# Patient Record
Sex: FEMALE | Race: BLACK OR AFRICAN AMERICAN | NOT HISPANIC OR LATINO | Employment: STUDENT | URBAN - METROPOLITAN AREA
[De-identification: names, ages, dates, MRNs, and addresses within clinical notes are randomized per-mention and may not be internally consistent; named-entity substitution may affect disease eponyms.]

---

## 2017-07-26 ENCOUNTER — APPOINTMENT (OUTPATIENT)
Dept: LAB | Facility: HOSPITAL | Age: 32
End: 2017-07-26
Payer: COMMERCIAL

## 2017-07-26 ENCOUNTER — TRANSCRIBE ORDERS (OUTPATIENT)
Dept: LAB | Facility: HOSPITAL | Age: 32
End: 2017-07-26

## 2017-07-26 DIAGNOSIS — Z00.8 HEALTH EXAMINATION IN POPULATION SURVEY: ICD-10-CM

## 2017-07-26 DIAGNOSIS — Z00.8 HEALTH EXAMINATION IN POPULATION SURVEY: Primary | ICD-10-CM

## 2017-07-26 LAB
CHOLEST SERPL-MCNC: 177 MG/DL (ref 50–200)
EST. AVERAGE GLUCOSE BLD GHB EST-MCNC: 131 MG/DL
HBA1C MFR BLD: 6.2 % (ref 4.2–6.3)
HDLC SERPL-MCNC: 49 MG/DL (ref 40–60)
LDLC SERPL CALC-MCNC: 120 MG/DL (ref 0–100)
TRIGL SERPL-MCNC: 42 MG/DL

## 2017-07-26 PROCEDURE — 83036 HEMOGLOBIN GLYCOSYLATED A1C: CPT

## 2017-07-26 PROCEDURE — 80061 LIPID PANEL: CPT

## 2017-07-26 PROCEDURE — 36415 COLL VENOUS BLD VENIPUNCTURE: CPT

## 2018-01-04 ENCOUNTER — GENERIC CONVERSION - ENCOUNTER (OUTPATIENT)
Dept: OTHER | Facility: OTHER | Age: 33
End: 2018-01-04

## 2018-01-04 ENCOUNTER — APPOINTMENT (OUTPATIENT)
Dept: LAB | Facility: HOSPITAL | Age: 33
End: 2018-01-04
Payer: COMMERCIAL

## 2018-01-04 ENCOUNTER — HOSPITAL ENCOUNTER (OUTPATIENT)
Dept: RADIOLOGY | Facility: HOSPITAL | Age: 33
Discharge: HOME | End: 2018-01-04
Payer: COMMERCIAL

## 2018-01-04 DIAGNOSIS — M25.531 PAIN IN RIGHT WRIST: ICD-10-CM

## 2018-01-04 DIAGNOSIS — Z13.220 ENCOUNTER FOR SCREENING FOR LIPOID DISORDERS: ICD-10-CM

## 2018-01-04 DIAGNOSIS — D64.9 ANEMIA: ICD-10-CM

## 2018-01-04 LAB
ALBUMIN SERPL BCP-MCNC: 3.7 G/DL (ref 3.5–5)
ALP SERPL-CCNC: 58 U/L (ref 46–116)
ALT SERPL W P-5'-P-CCNC: 22 U/L (ref 12–78)
ANION GAP SERPL CALCULATED.3IONS-SCNC: 6 MMOL/L (ref 4–13)
AST SERPL W P-5'-P-CCNC: 23 U/L (ref 5–45)
BASOPHILS # BLD AUTO: 0.01 THOUSANDS/ΜL (ref 0–0.1)
BASOPHILS NFR BLD AUTO: 0 % (ref 0–1)
BILIRUB SERPL-MCNC: 0.29 MG/DL (ref 0.2–1)
BUN SERPL-MCNC: 9 MG/DL (ref 5–25)
CALCIUM SERPL-MCNC: 9.4 MG/DL (ref 8.3–10.1)
CHLORIDE SERPL-SCNC: 104 MMOL/L (ref 100–108)
CHOLEST SERPL-MCNC: 173 MG/DL (ref 50–200)
CO2 SERPL-SCNC: 27 MMOL/L (ref 21–32)
CREAT SERPL-MCNC: 0.74 MG/DL (ref 0.6–1.3)
EOSINOPHIL # BLD AUTO: 0.06 THOUSAND/ΜL (ref 0–0.61)
EOSINOPHIL NFR BLD AUTO: 1 % (ref 0–6)
ERYTHROCYTE [DISTWIDTH] IN BLOOD BY AUTOMATED COUNT: 13.4 % (ref 11.6–15.1)
GFR SERPL CREATININE-BSD FRML MDRD: 123 ML/MIN/1.73SQ M
GLUCOSE P FAST SERPL-MCNC: 97 MG/DL (ref 65–99)
HCT VFR BLD AUTO: 37.7 % (ref 34.8–46.1)
HDLC SERPL-MCNC: 46 MG/DL (ref 40–60)
HGB BLD-MCNC: 12.3 G/DL (ref 11.5–15.4)
IRON SATN MFR SERPL: 10 %
IRON SERPL-MCNC: 41 UG/DL (ref 50–170)
LDLC SERPL CALC-MCNC: 113 MG/DL (ref 0–100)
LYMPHOCYTES # BLD AUTO: 2.76 THOUSANDS/ΜL (ref 0.6–4.47)
LYMPHOCYTES NFR BLD AUTO: 48 % (ref 14–44)
MCH RBC QN AUTO: 28.1 PG (ref 26.8–34.3)
MCHC RBC AUTO-ENTMCNC: 32.6 G/DL (ref 31.4–37.4)
MCV RBC AUTO: 86 FL (ref 82–98)
MONOCYTES # BLD AUTO: 0.33 THOUSAND/ΜL (ref 0.17–1.22)
MONOCYTES NFR BLD AUTO: 6 % (ref 4–12)
NEUTROPHILS # BLD AUTO: 2.56 THOUSANDS/ΜL (ref 1.85–7.62)
NEUTS SEG NFR BLD AUTO: 45 % (ref 43–75)
PLATELET # BLD AUTO: 412 THOUSANDS/UL (ref 149–390)
PMV BLD AUTO: 10.1 FL (ref 8.9–12.7)
POTASSIUM SERPL-SCNC: 4.8 MMOL/L (ref 3.5–5.3)
PROT SERPL-MCNC: 8 G/DL (ref 6.4–8.2)
RBC # BLD AUTO: 4.38 MILLION/UL (ref 3.81–5.12)
SODIUM SERPL-SCNC: 137 MMOL/L (ref 136–145)
TIBC SERPL-MCNC: 416 UG/DL (ref 250–450)
TRIGL SERPL-MCNC: 69 MG/DL
WBC # BLD AUTO: 5.72 THOUSAND/UL (ref 4.31–10.16)

## 2018-01-04 PROCEDURE — 80053 COMPREHEN METABOLIC PANEL: CPT

## 2018-01-04 PROCEDURE — 83550 IRON BINDING TEST: CPT

## 2018-01-04 PROCEDURE — 85025 COMPLETE CBC W/AUTO DIFF WBC: CPT

## 2018-01-04 PROCEDURE — 83540 ASSAY OF IRON: CPT

## 2018-01-04 PROCEDURE — 36415 COLL VENOUS BLD VENIPUNCTURE: CPT

## 2018-01-04 PROCEDURE — 80061 LIPID PANEL: CPT

## 2018-01-04 PROCEDURE — 73110 X-RAY EXAM OF WRIST: CPT

## 2018-01-05 ENCOUNTER — GENERIC CONVERSION - ENCOUNTER (OUTPATIENT)
Dept: OTHER | Facility: OTHER | Age: 33
End: 2018-01-05

## 2018-01-16 NOTE — PROGRESS NOTES
Chief Complaint  Patient presents today for flu shot  Vaccine administered in left deltoid  Patient tolerated vaccine and left office  BB CMA      Active Problems    1  1St trimester screening (V28 89) (Z36)   2  Acute conjunctivitis, right eye (372 00) (H10 31)   3  Acute pharyngitis (462) (J02 9)   4  Allergic rhinitis (477 9) (J30 9)   5  Anemia (285 9) (D64 9)   6  Encounter for fetal anatomic survey (V28 81) (Z36)   7  Encounter for screening for risk of pre-term labor (V28 82) (Z36)   8  Headache (784 0) (R51)   9  Leiomyoma of uterus (218 9) (D25 9)   10  Postprandial RUQ pain (789 01) (R10 11)   11  Pregnancy (V22 2) (Z33 1)   12  Pregnancy at early stage (V22 2) (Z33 1)   13  Pregnancy with history of uterine myomectomy (654 90) (O34 29)   14  Uterine fibroids affecting pregnancy, antepartum (654 13) (O34 10)    Current Meds   1  Ferrous Gluconate 324 (38 Fe) MG Oral Tablet; TAKE 1 TABLET TWICE DAILY; Therapy: 71Otj2440 to (Evaluate:23Jan2017)  Requested for: 57Zbf0249; Last   Rx:[id number] Ordered   2  Vitamin C 250 MG Oral Tablet; TAKE 1 TABLET TWICE DAILY WITH IRON   SUPPLEMENT; Therapy: 01Tip2928 to (Evaluate:23Jan2017)  Requested for: 26Goe3620; Last   Rx:[id number] Ordered    Allergies    1  No Known Drug Allergies    2  No Known Environmental Allergies   3   No Known Food Allergies    Plan  Need for influenza vaccination    · Fluarix Intramuscular Suspension    Signatures   Electronically signed by : Moshe Madden, ; Nov 16 2016 12:52PM EST                       (Author)    Electronically signed by : SANTA Arredondo ; Nov 16 2016  1:40PM EST

## 2018-01-16 NOTE — RESULT NOTES
Verified Results  (1) CBC/PLT/DIFF [id number] 03:44PM Rogelio Reilly Order Number: [id number]     Test Name Result Flag Reference   WBC COUNT 5 23 Thousand/uL  4 31-10 16   RBC COUNT 4 02 Million/uL  3 81-5 12   HEMOGLOBIN 11 5 g/dL  11 5-15 4   HEMATOCRIT 34 2 % L 34 8-46  1   MCV 85 fL  82-98   MCH 28 6 pg  26 8-34 3   MCHC 33 6 g/dL  31 4-37 4   RDW 13 7 %  11 6-15 1   MPV 10 1 fL  8 9-12 7   PLATELET COUNT 386 Thousands/uL  149-390   nRBC AUTOMATED 0 /100 WBCs     NEUTROPHILS RELATIVE PERCENT 39 % L 43-75   LYMPHOCYTES RELATIVE PERCENT 55 % H 14-44   MONOCYTES RELATIVE PERCENT 5 %  4-12   EOSINOPHILS RELATIVE PERCENT 1 %  0-6   BASOPHILS RELATIVE PERCENT 0 %  0-1   NEUTROPHILS ABSOLUTE COUNT 2 05 Thousands/?L  1 85-7 62   LYMPHOCYTES ABSOLUTE COUNT 2 85 Thousands/?L  0 60-4 47   MONOCYTES ABSOLUTE COUNT 0 28 Thousand/?L  0 17-1 22   EOSINOPHILS ABSOLUTE COUNT 0 05 Thousand/?L  0 00-0 61   BASOPHILS ABSOLUTE COUNT 0 00 Thousands/?L  0 00-0 10   - Patient Instructions: This bloodwork is non-fasting  Please drink two glasses of water morning of bloodwork  - Patient Instructions: This bloodwork is non-fasting  Please drink two glasses of water morning of bloodwork  (1) COMPREHENSIVE METABOLIC PANEL [id number] 02:72XI Rogelio Reilly Order Number: [id number]     Test Name Result Flag Reference   GLUCOSE,RANDM 94 mg/dL     If the patient is fasting, the ADA then defines impaired fasting glucose as > 100 mg/dL and diabetes as > or equal to 123 mg/dL     SODIUM 139 mmol/L  136-145   POTASSIUM 4 0 mmol/L  3 5-5 3   CHLORIDE 105 mmol/L  100-108   CARBON DIOXIDE 29 mmol/L  21-32   ANION GAP (CALC) 5 mmol/L  4-13   BLOOD UREA NITROGEN 11 mg/dL  5-25   CREATININE 0 76 mg/dL  0 60-1 30   Standardized to IDMS reference method   CALCIUM 9 4 mg/dL  8 3-10 1   BILI, TOTAL 0 22 mg/dL  0 20-1 00   ALK PHOSPHATAS 76 U/L     ALT (SGPT) 16 U/L  12-78   AST(SGOT) 15 U/L  5-45   ALBUMIN 3 7 g/dL  3 5-5 0   TOTAL PROTEIN 7 4 g/dL  6 4-8 2   eGFR Non-African American      >60 0 ml/min/1 73sq [facility] Disease Education Program recommendations are as follows:  GFR calculation is accurate only with a steady state creatinine  Chronic Kidney disease less than 60 ml/min/1 73 sq  meters  Kidney failure less than 15 ml/min/1 73 sq  meters       (1) IRON SATURATION %, TIBC [id number] 03:44PM Kranthi Ada Order Number: [id number]     Test Name Result Flag Reference   IRON SATURATION 9 %     TOTAL IRON BINDING CAPACITY 400 ug/dL  250-450   IRON 37 ug/dL L      (1) MAGNESIUM [id number] 03:44PM Kranthi Ada Order Number: [id number]     Test Name Result Flag Reference   MAGNESIUM 2 1 mg/dL  1 6-2 6       Plan  Anemia    · Ferrous Gluconate 324 (38 Fe) MG Oral Tablet; TAKE 1 TABLET TWICE DAILY   · Vitamin C 250 MG Oral Tablet; TAKE 1 TABLET TWICE DAILY WITH IRON  SUPPLEMENT

## 2018-01-23 NOTE — RESULT NOTES
Verified Results  * XR WRIST 3+ VIEW RIGHT [id number] 11:41AM [facility] Order Number: [id number]     Test Name Result Flag Reference   XR WRIST 3+ VW RIGHT (Report)     RIGHT WRIST     INDICATION:  M25 531: Pain in right wrist  History taken directly from the electronic ordering system  COMPARISON: None     VIEWS: PA, lateral, and oblique     IMAGES: 3     FINDINGS:     There is no acute fracture or dislocation  No degenerative changes  No lytic or blastic lesions are seen  Soft tissues are unremarkable  IMPRESSION:     No acute osseous abnormality  If there is clinical concern for a radiographically occult fracture, consider further evaluation with MRI         Workstation performed: [id number]     Signed by:   Sheryl Mcintyre MD   1/5/18

## 2018-01-23 NOTE — RESULT NOTES
Verified Results  (1) CBC/PLT/DIFF [id number] 11:51AM [facility] Order Number: [id number]     Test Name Result Flag Reference   WBC COUNT 5 72 Thousand/uL  4 31-10 16   RBC COUNT 4 38 Million/uL  3 81-5 12   HEMOGLOBIN 12 3 g/dL  11 5-15 4   HEMATOCRIT 37 7 %  34 8-46  1   MCV 86 fL  82-98   MCH 28 1 pg  26 8-34 3   MCHC 32 6 g/dL  31 4-37 4   RDW 13 4 %  11 6-15 1   MPV 10 1 fL  8 9-12 7   PLATELET COUNT 281 Thousands/uL H 149-390   NEUTROPHILS RELATIVE PERCENT 45 %  43-75   LYMPHOCYTES RELATIVE PERCENT 48 % H 14-44   MONOCYTES RELATIVE PERCENT 6 %  4-12   EOSINOPHILS RELATIVE PERCENT 1 %  0-6   BASOPHILS RELATIVE PERCENT 0 %  0-1   NEUTROPHILS ABSOLUTE COUNT 2 56 Thousands/? ??L  1 85-7 62   LYMPHOCYTES ABSOLUTE COUNT 2 76 Thousands/? ??L  0 60-4 47   MONOCYTES ABSOLUTE COUNT 0 33 Thousand/? ??L  0 17-1 22   EOSINOPHILS ABSOLUTE COUNT 0 06 Thousand/? ??L  0 00-0 61   BASOPHILS ABSOLUTE COUNT 0 01 Thousands/? ??L  0 00-0 10     (1) COMPREHENSIVE METABOLIC PANEL [id number] 88:31GP [facility] Order Number: [id number]     Test Name Result Flag Reference   SODIUM 137 mmol/L  136-145   POTASSIUM 4 8 mmol/L  3 5-5 3   CHLORIDE 104 mmol/L  100-108   CARBON DIOXIDE 27 mmol/L  21-32   ANION GAP (CALC) 6 mmol/L  4-13   BLOOD UREA NITROGEN 9 mg/dL  5-25   CREATININE 0 74 mg/dL  0 60-1 30   Standardized to IDMS reference method   CALCIUM 9 4 mg/dL  8 3-10 1   BILI, TOTAL 0 29 mg/dL  0 20-1 00   ALK PHOSPHATAS 58 U/L     ALT (SGPT) 22 U/L  12-78   Specimen collection should occur prior to Sulfasalazine administration due to the potential for falsely depressed results  AST(SGOT) 23 U/L  5-45   Specimen collection should occur prior to Sulfasalazine administration due to the potential for falsely depressed results     ALBUMIN 3 7 g/dL  3 5-5 0   TOTAL PROTEIN 8 0 g/dL  6 4-8 2   eGFR 123 ml/min/1 73sq m     National Kidney Disease Education Program recommendations are as follows:  GFR calculation is accurate only with a steady state creatinine  Chronic Kidney disease less than 60 ml/min/1 73 sq  meters  Kidney failure less than 15 ml/min/1 73 sq  meters  GLUCOSE FASTING 97 mg/dL  65-99   Specimen collection should occur prior to Sulfasalazine administration due to the potential for falsely depressed results  Specimen collection should occur prior to Sulfapyridine administration due to the potential for falsely elevated results  (1) LIPID PANEL, FASTING [id number] 11:51AM Kayla Gonzales Order Number: [id number]     Test Name Result Flag Reference   CHOLESTEROL 173 mg/dL     HDL,DIRECT 46 mg/dL  40-60   Specimen collection should occur prior to Metamizole administration due to the potential for falsley depressed results  LDL CHOLESTEROL CALCULATED 113 mg/dL H 0-100   Triglyceride:        Normal <150 mg/dl   Borderline High 150-199 mg/dl   High 200-499 mg/dl   Very High >499 mg/dl      Cholesterol:       Desirable <200 mg/dl    Borderline High 200-239 mg/dl    High >239 mg/dl      HDL Cholesterol:       High>59 mg/dL    Low <41 mg/dL      This screening LDL is a calculated result  It does not have the accuracy of the Direct Measured LDL in the monitoring of patients with hyperlipidemia and/or statin therapy  Direct Measure LDL (YKG186) must be ordered separately in these patients  TRIGLYCERIDES 69 mg/dL  <=150   Specimen collection should occur prior to N-Acetylcysteine or Metamizole administration due to the potential for falsely depressed results  (1) IRON SATURATION %, TIBC [id number] 11:51AM Kayla Gonzales Order Number: [id number]     Test Name Result Flag Reference   IRON SATURATION 10 %     TOTAL IRON BINDING CAPACITY 416 ug/dL  250-450   IRON 41 ug/dL L    Patients treated with metal-binding drugs (ie  Deferoxamine) may have depressed iron values

## 2018-01-24 VITALS
OXYGEN SATURATION: 100 % | TEMPERATURE: 97.7 F | DIASTOLIC BLOOD PRESSURE: 64 MMHG | BODY MASS INDEX: 27.88 KG/M2 | SYSTOLIC BLOOD PRESSURE: 110 MMHG | RESPIRATION RATE: 18 BRPM | HEART RATE: 80 BPM | HEIGHT: 62 IN | WEIGHT: 151.5 LBS

## 2018-06-26 ENCOUNTER — APPOINTMENT (OUTPATIENT)
Dept: LAB | Facility: HOSPITAL | Age: 33
End: 2018-06-26

## 2018-06-26 ENCOUNTER — TRANSCRIBE ORDERS (OUTPATIENT)
Dept: LAB | Facility: HOSPITAL | Age: 33
End: 2018-06-26

## 2018-06-26 DIAGNOSIS — Z00.8 HEALTH EXAMINATION IN POPULATION SURVEY: Primary | ICD-10-CM

## 2018-06-26 DIAGNOSIS — Z00.8 HEALTH EXAMINATION IN POPULATION SURVEY: ICD-10-CM

## 2018-06-26 LAB
CHOLEST SERPL-MCNC: 199 MG/DL (ref 50–200)
EST. AVERAGE GLUCOSE BLD GHB EST-MCNC: 120 MG/DL
HBA1C MFR BLD: 5.8 % (ref 4.2–6.3)
HDLC SERPL-MCNC: 55 MG/DL (ref 40–60)
LDLC SERPL CALC-MCNC: 130 MG/DL (ref 0–100)
NONHDLC SERPL-MCNC: 144 MG/DL
TRIGL SERPL-MCNC: 69 MG/DL

## 2018-06-26 PROCEDURE — 80061 LIPID PANEL: CPT

## 2018-06-26 PROCEDURE — 36415 COLL VENOUS BLD VENIPUNCTURE: CPT

## 2018-06-26 PROCEDURE — 83036 HEMOGLOBIN GLYCOSYLATED A1C: CPT

## 2018-06-28 ENCOUNTER — OFFICE VISIT (OUTPATIENT)
Dept: GYNECOLOGY | Facility: CLINIC | Age: 33
End: 2018-06-28
Payer: COMMERCIAL

## 2018-06-28 VITALS
SYSTOLIC BLOOD PRESSURE: 118 MMHG | HEIGHT: 62 IN | HEART RATE: 77 BPM | WEIGHT: 154.2 LBS | BODY MASS INDEX: 28.37 KG/M2 | DIASTOLIC BLOOD PRESSURE: 76 MMHG

## 2018-06-28 DIAGNOSIS — Z01.419 ENCOUNTER FOR ANNUAL ROUTINE GYNECOLOGICAL EXAMINATION: Primary | ICD-10-CM

## 2018-06-28 DIAGNOSIS — Z98.890 HISTORY OF MYOMECTOMY: ICD-10-CM

## 2018-06-28 DIAGNOSIS — Z98.891 HISTORY OF CESAREAN SECTION: ICD-10-CM

## 2018-06-28 PROCEDURE — 87624 HPV HI-RISK TYP POOLED RSLT: CPT | Performed by: OBSTETRICS & GYNECOLOGY

## 2018-06-28 PROCEDURE — G0145 SCR C/V CYTO,THINLAYER,RESCR: HCPCS | Performed by: OBSTETRICS & GYNECOLOGY

## 2018-06-28 PROCEDURE — 99395 PREV VISIT EST AGE 18-39: CPT | Performed by: OBSTETRICS & GYNECOLOGY

## 2018-06-28 RX ORDER — IBUPROFEN 200 MG
200 TABLET ORAL EVERY 6 HOURS PRN
COMMUNITY
End: 2019-11-26 | Stop reason: ALTCHOICE

## 2018-06-28 NOTE — PROGRESS NOTES
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  Recommendation was to have her family completed within a few years of the myomectomy  Menorrhagia- 2 Advil q 6 hrs  w onset of bleeding  Co Testing q 3yrs-   RTO 1yr  SBE monthly  Exercise 3/wk  Calcium 1,000 mg/d with Vit D        Diagnoses and all orders for this visit:    Encounter for annual routine gynecological examination  -     Liquid-based pap, screening    History of  section    History of myomectomy    Other orders  -     ibuprofen (MOTRIN) 200 mg tablet; Take 200 mg by mouth every 6 (six) hours as needed for mild pain              Subjective:        Patient ID: Reyes Ellington is a 34 y o  female  Laly is here for an annual visit  She notes cyclic breast tenderness which begins approximately 2 days before her period  At this point she is not interested in having children 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 nursing school  At the time of the myomectomy it was recommended to have her pregnancies completed over short period of time in case the fibroid grew back  The following portions of the patient's history were reviewed and updated as appropriate: She  has a past medical history of Fibroid  Patient Active Problem List    Diagnosis Date Noted    Encounter for annual routine gynecological examination 2018    History of  section 2018    History of myomectomy 2018   PMH:   Menarche 12  Primary infertility '- HSG Dx submucous fibroids , Submucous Myomectomy (2, others present and observed)   Waited till  to attempt conception  Chest Pain ' - nl Holter and Echo  C/S at 37 wks due to myomectomy 4/15  Menorrhagia- since delivery  She  has a past surgical history that includes Myomectomy and  section    Her family history includes Dementia in her maternal grandmother; Diabetes in her paternal grandfather; Hypertension in her father, mother, and sister  She  reports that she has never smoked  She has never used smokeless tobacco  She reports that she does not drink alcohol or use drugs  PCA P8, currently in nursing school  From [facility], moved to [address] [place] [address] Rd[address] 7/12   '08  Current Outpatient Prescriptions   Medication Sig Dispense Refill    ibuprofen (MOTRIN) 200 mg tablet Take 200 mg by mouth every 6 (six) hours as needed for mild pain       No current facility-administered medications for this visit  No current outpatient prescriptions on file prior to visit  No current facility-administered medications on file prior to visit  She has No Known Allergies       Review of Systems   Constitutional: Negative for activity change, appetite change, fatigue and unexpected weight change  Eyes: Negative for visual disturbance  Respiratory: Negative for cough, chest tightness, shortness of breath and wheezing  Cardiovascular: Negative for chest pain, palpitations and leg swelling  Breast: Patient denies tenderness, nipple discharge, masses, or erythema  Gastrointestinal: Negative for abdominal distention, abdominal pain, blood in stool, constipation, diarrhea, nausea and vomiting  Endocrine: Negative for cold intolerance and heat intolerance  Genitourinary: Negative for decreased urine volume, difficulty urinating, dyspareunia, dysuria, frequency, hematuria, menstrual problem, pelvic pain, urgency, vaginal bleeding, vaginal discharge and vaginal pain  Coitus once a month partly to avoid pregnancy  Musculoskeletal: Negative for arthralgias  Skin: Negative for rash  Neurological: Negative for weakness, light-headedness, numbness and headaches  Hematological: Does not bruise/bleed easily  Psychiatric/Behavioral: Negative for agitation, behavioral problems and sleep disturbance  The patient is not nervous/anxious  Objective:    Vitals:    06/28/18 0952   BP: 118/76   BP Location: Right arm   Patient Position: Sitting   Cuff Size: Standard   Pulse: 77   Weight: 69 9 kg (154 lb 3 2 oz)   Height: 5' 2" (1 575 m)            Physical Exam   Constitutional: She is oriented to person, place, and time  She appears well-developed and well-nourished  HENT:   Head: Normocephalic and atraumatic  Eyes: Conjunctivae and EOM are normal  Pupils are equal, round, and reactive to light  Neck: Normal range of motion  Neck supple  No tracheal deviation present  No thyromegaly present  Cardiovascular: Normal rate, regular rhythm and normal heart sounds  No murmur heard  Pulmonary/Chest: Effort normal and breath sounds normal  No respiratory distress  She has no wheezes  Right breast exhibits no inverted nipple, no mass, no nipple discharge, no skin change and no tenderness  Left breast exhibits no inverted nipple, no mass, no nipple discharge, no skin change and no tenderness  Breasts are symmetrical    Abdominal: Soft  Bowel sounds are normal  She exhibits no distension and no mass  There is no tenderness  Genitourinary: Vagina normal and uterus normal  Rectal exam shows no external hemorrhoid  No breast swelling, tenderness, discharge or bleeding  There is no rash, tenderness or lesion on the right labia  There is no rash, tenderness or lesion on the left labia  Uterus is not deviated, not enlarged and not tender  Cervix exhibits no motion tenderness and no discharge  Right adnexum displays no mass, no tenderness and no fullness  Left adnexum displays no mass, no tenderness and no fullness  Genitourinary Comments: Uterus anteverted and normal size  Questionable nodule on the anterior fundus  Musculoskeletal: Normal range of motion  Neurological: She is alert and oriented to person, place, and time  Skin: Skin is warm and dry  Psychiatric: She has a normal mood and affect   Her behavior is normal  Judgment and thought content normal    Nursing note and vitals reviewed

## 2018-07-03 LAB — HPV RRNA GENITAL QL NAA+PROBE: NORMAL

## 2018-07-09 LAB
LAB AP GYN PRIMARY INTERPRETATION: NORMAL
Lab: NORMAL

## 2018-11-13 ENCOUNTER — OFFICE VISIT (OUTPATIENT)
Dept: FAMILY MEDICINE CLINIC | Facility: CLINIC | Age: 33
End: 2018-11-13
Payer: COMMERCIAL

## 2018-11-13 VITALS
RESPIRATION RATE: 18 BRPM | HEIGHT: 62 IN | SYSTOLIC BLOOD PRESSURE: 122 MMHG | WEIGHT: 155.44 LBS | OXYGEN SATURATION: 98 % | DIASTOLIC BLOOD PRESSURE: 80 MMHG | BODY MASS INDEX: 28.61 KG/M2 | TEMPERATURE: 97.6 F | HEART RATE: 116 BPM

## 2018-11-13 DIAGNOSIS — Z78.9 HEPATITIS B VACCINATION STATUS UNKNOWN: ICD-10-CM

## 2018-11-13 DIAGNOSIS — Z00.00 ANNUAL PHYSICAL EXAM: Primary | ICD-10-CM

## 2018-11-13 PROCEDURE — 99395 PREV VISIT EST AGE 18-39: CPT | Performed by: PHYSICIAN ASSISTANT

## 2018-11-13 NOTE — PROGRESS NOTES
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  Patient has no concerns or questions today  No problems with vision or hearing  Brushes teeth daily, and has regular dental visits  States he eats a healthy diet, 3 meals a day  No routine physical activity  Does follow up routinely with OBGYN  Denies any tobacco, alcohol, drug use  Has had flu shot already this year  Patient states that really helped does have copy of her immunization records  Believe she is up-to-date on everything  The following portions of the patient's history were reviewed and updated as appropriate:   She  has a past medical history of Fibroid  She   Patient Active Problem List    Diagnosis Date Noted    Encounter for annual routine gynecological examination 2018    History of  section 2018    History of myomectomy 2018     She  has a past surgical history that includes Myomectomy and  section  Her family history includes Dementia in her maternal grandmother; Diabetes in her paternal grandfather; Hypertension in her father, mother, and sister  She  reports that she has never smoked  She has never used smokeless tobacco  She reports that she does not drink alcohol or use drugs    Current Outpatient Prescriptions   Medication Sig Dispense Refill    ibuprofen (MOTRIN) 200 mg tablet Take 200 mg by mouth every 6 (six) hours as needed for mild pain       No current facility-administered medications for this visit  She has No Known Allergies       Review of Systems   Constitutional: Negative for chills, fatigue and fever  HENT: Negative for congestion, ear pain, hearing loss, rhinorrhea and sore throat  Eyes: Negative for pain and visual disturbance  Respiratory: Negative for cough, shortness of breath and wheezing  Cardiovascular: Negative for chest pain, palpitations and leg swelling  Gastrointestinal: Negative for abdominal pain, blood in stool, constipation, diarrhea, nausea and vomiting  Endocrine: Negative for cold intolerance, heat intolerance and polyuria  Genitourinary: Negative for difficulty urinating, dysuria, frequency, hematuria and urgency  Musculoskeletal: Negative for arthralgias, joint swelling and myalgias  Skin: Negative for rash and wound  Neurological: Negative for dizziness, syncope, weakness, numbness and headaches  Psychiatric/Behavioral: Negative for dysphoric mood and sleep disturbance  The patient is not nervous/anxious  Objective:      /80 (BP Location: Left arm, Patient Position: Sitting, Cuff Size: Standard)   Pulse (!) 116   Temp 97 6 °F (36 4 °C) (Tympanic)   Resp 18   Ht 5' 2" (1 575 m)   Wt 70 5 kg (155 lb 7 oz)   LMP 11/12/2018 (Exact Date)   SpO2 98%   BMI 28 43 kg/m²          Physical Exam   Constitutional: She is oriented to person, place, and time  She appears well-developed and well-nourished  No distress  HENT:   Right Ear: Hearing, tympanic membrane, external ear and ear canal normal    Left Ear: Hearing, tympanic membrane, external ear and ear canal normal    Nose: Nose normal    Mouth/Throat: Oropharynx is clear and moist and mucous membranes are normal  No oropharyngeal exudate  Eyes: Pupils are equal, round, and reactive to light   Conjunctivae, EOM and lids are normal    Neck: Normal range of motion  Neck supple  Cardiovascular: Normal rate, regular rhythm, normal heart sounds and normal pulses  Exam reveals no gallop and no friction rub  No murmur heard  Pulmonary/Chest: Effort normal and breath sounds normal  No respiratory distress  She has no wheezes  She has no rales  Abdominal: Soft  Normal appearance and bowel sounds are normal  She exhibits no distension  There is no tenderness  There is no rebound and no guarding  Musculoskeletal: Normal range of motion  She exhibits no edema  Lymphadenopathy:     She has no cervical adenopathy  Neurological: She is alert and oriented to person, place, and time  She has normal reflexes  No cranial nerve deficit  Skin: Skin is warm and dry  No rash noted  She is not diaphoretic  Psychiatric: She has a normal mood and affect  Her behavior is normal  Thought content normal    Nursing note and vitals reviewed

## 2018-12-06 ENCOUNTER — APPOINTMENT (OUTPATIENT)
Dept: URGENT CARE | Facility: MEDICAL CENTER | Age: 33
End: 2018-12-06

## 2018-12-06 ENCOUNTER — TRANSCRIBE ORDERS (OUTPATIENT)
Dept: URGENT CARE | Facility: MEDICAL CENTER | Age: 33
End: 2018-12-06

## 2018-12-06 ENCOUNTER — APPOINTMENT (OUTPATIENT)
Dept: LAB | Facility: MEDICAL CENTER | Age: 33
End: 2018-12-06

## 2018-12-06 DIAGNOSIS — Z02.0 ENCOUNTER FOR PHYSICAL EXAMINATION OF STUDENT: ICD-10-CM

## 2018-12-06 DIAGNOSIS — Z02.0 ENCOUNTER FOR PHYSICAL EXAMINATION OF STUDENT: Primary | ICD-10-CM

## 2018-12-06 DIAGNOSIS — Z78.9 HEPATITIS B VACCINATION STATUS UNKNOWN: ICD-10-CM

## 2018-12-06 LAB — HBV SURFACE AB SER-ACNC: >1000 MIU/ML

## 2018-12-06 PROCEDURE — 86480 TB TEST CELL IMMUN MEASURE: CPT

## 2018-12-06 PROCEDURE — 86706 HEP B SURFACE ANTIBODY: CPT

## 2018-12-06 PROCEDURE — 36415 COLL VENOUS BLD VENIPUNCTURE: CPT

## 2018-12-10 LAB
GAMMA INTERFERON BACKGROUND BLD IA-ACNC: 0.03 IU/ML
M TB IFN-G BLD-IMP: NEGATIVE
M TB IFN-G CD4+ BCKGRND COR BLD-ACNC: 0.03 IU/ML
M TB IFN-G CD4+ BCKGRND COR BLD-ACNC: 0.03 IU/ML
MITOGEN IGNF BCKGRD COR BLD-ACNC: >10 IU/ML

## 2019-04-14 ENCOUNTER — HOSPITAL ENCOUNTER (EMERGENCY)
Facility: HOSPITAL | Age: 34
Discharge: HOME | End: 2019-04-14
Attending: EMERGENCY MEDICINE | Admitting: EMERGENCY MEDICINE
Payer: COMMERCIAL

## 2019-04-14 VITALS
HEART RATE: 84 BPM | SYSTOLIC BLOOD PRESSURE: 122 MMHG | RESPIRATION RATE: 16 BRPM | TEMPERATURE: 97.5 F | DIASTOLIC BLOOD PRESSURE: 71 MMHG | WEIGHT: 156.09 LBS | OXYGEN SATURATION: 100 % | BODY MASS INDEX: 28.55 KG/M2

## 2019-04-14 DIAGNOSIS — M79.10 MYALGIA: ICD-10-CM

## 2019-04-14 DIAGNOSIS — J02.9 PHARYNGITIS: ICD-10-CM

## 2019-04-14 DIAGNOSIS — B34.9 VIRAL SYNDROME: Primary | ICD-10-CM

## 2019-04-14 LAB
BACTERIA UR QL AUTO: ABNORMAL /HPF
BILIRUB UR QL STRIP: NEGATIVE
CLARITY UR: CLEAR
COLOR UR: YELLOW
COLOR, POC: YELLOW
EXT PREG TEST URINE: NEGATIVE
GLUCOSE UR STRIP-MCNC: NEGATIVE MG/DL
HGB UR QL STRIP.AUTO: ABNORMAL
KETONES UR STRIP-MCNC: NEGATIVE MG/DL
LEUKOCYTE ESTERASE UR QL STRIP: NEGATIVE
NITRITE UR QL STRIP: NEGATIVE
NON-SQ EPI CELLS URNS QL MICRO: ABNORMAL /HPF
PH UR STRIP.AUTO: 6 [PH] (ref 4.5–8)
PROT UR STRIP-MCNC: NEGATIVE MG/DL
RBC #/AREA URNS AUTO: ABNORMAL /HPF
SP GR UR STRIP.AUTO: 1.01 (ref 1–1.03)
UROBILINOGEN UR QL STRIP.AUTO: 0.2 E.U./DL
WBC #/AREA URNS AUTO: ABNORMAL /HPF

## 2019-04-14 PROCEDURE — 81025 URINE PREGNANCY TEST: CPT | Performed by: EMERGENCY MEDICINE

## 2019-04-14 PROCEDURE — 81001 URINALYSIS AUTO W/SCOPE: CPT

## 2019-04-14 PROCEDURE — 99283 EMERGENCY DEPT VISIT LOW MDM: CPT

## 2019-04-14 PROCEDURE — 99282 EMERGENCY DEPT VISIT SF MDM: CPT | Performed by: EMERGENCY MEDICINE

## 2019-04-14 RX ORDER — KETOROLAC TROMETHAMINE 30 MG/ML
15 INJECTION, SOLUTION INTRAMUSCULAR; INTRAVENOUS ONCE
Status: DISCONTINUED | OUTPATIENT
Start: 2019-04-14 | End: 2019-04-14 | Stop reason: HOSPADM

## 2019-04-14 RX ORDER — ACETAMINOPHEN 325 MG/1
650 TABLET ORAL ONCE
Status: COMPLETED | OUTPATIENT
Start: 2019-04-14 | End: 2019-04-14

## 2019-04-14 RX ADMIN — DEXAMETHASONE SODIUM PHOSPHATE 10 MG: 10 INJECTION, SOLUTION INTRAMUSCULAR; INTRAVENOUS at 09:38

## 2019-04-14 RX ADMIN — ACETAMINOPHEN 650 MG: 325 TABLET ORAL at 09:38

## 2019-09-25 ENCOUNTER — OFFICE VISIT (OUTPATIENT)
Dept: FAMILY MEDICINE CLINIC | Facility: CLINIC | Age: 34
End: 2019-09-25

## 2019-09-25 VITALS
SYSTOLIC BLOOD PRESSURE: 100 MMHG | TEMPERATURE: 98 F | WEIGHT: 162 LBS | RESPIRATION RATE: 16 BRPM | HEART RATE: 103 BPM | OXYGEN SATURATION: 98 % | BODY MASS INDEX: 29.63 KG/M2 | DIASTOLIC BLOOD PRESSURE: 74 MMHG

## 2019-09-25 DIAGNOSIS — R52 BODY ACHES: Primary | ICD-10-CM

## 2019-09-25 DIAGNOSIS — B34.9 VIRAL ILLNESS: ICD-10-CM

## 2019-09-25 PROCEDURE — 99213 OFFICE O/P EST LOW 20 MIN: CPT | Performed by: PHYSICIAN ASSISTANT

## 2019-09-25 PROCEDURE — 96372 THER/PROPH/DIAG INJ SC/IM: CPT | Performed by: PHYSICIAN ASSISTANT

## 2019-09-25 RX ORDER — METHYLPREDNISOLONE 4 MG/1
TABLET ORAL
Qty: 21 EACH | Refills: 0 | Status: SHIPPED | OUTPATIENT
Start: 2019-09-25 | End: 2019-11-09 | Stop reason: ALTCHOICE

## 2019-09-25 RX ORDER — KETOROLAC TROMETHAMINE 30 MG/ML
60 INJECTION, SOLUTION INTRAMUSCULAR; INTRAVENOUS ONCE
Status: COMPLETED | OUTPATIENT
Start: 2019-09-25 | End: 2019-09-25

## 2019-09-25 RX ADMIN — KETOROLAC TROMETHAMINE 60 MG: 30 INJECTION, SOLUTION INTRAMUSCULAR; INTRAVENOUS at 16:27

## 2019-09-25 NOTE — PROGRESS NOTES
Assessment/Plan:    No visible sign of an infection at this time, patient does not have a fever  Will order lab work for further evaluation  Since Toradol may have provided relief of her body aches, will administer that today  Will also send over short course of steroid see if this helps with her symptoms  If symptoms continue for the next week, would recommend making a follow-up appointment  Diagnoses and all orders for this visit:    Body aches  -     CBC and differential; Future  -     Comprehensive metabolic panel; Future  -     Lipid panel; Future  -     UA w Reflex to Microscopic w Reflex to Culture -Lab Collect; Future  -     Lyme Antibody Profile with reflex to WB; Future  -     TSH, 3rd generation with Free T4 reflex; Future  -     ketorolac (TORADOL) 60 mg/2 mL IM injection 60 mg    Viral illness  -     methylPREDNISolone 4 MG tablet therapy pack; Use as directed on package          Subjective:      Patient ID: Zhen Palacios is a 28 y o  female  56-year-old female presenting with concerns of body aches and feeling feverish  Patient states that symptoms started 3 days ago  Has been having an achy sensation throughout her whole body, and has felt feverish  She also gets cold chills at times required to wear blank  She has also been experiencing a scratchy throat  Has not taken her temperature so is unclear if she actually has a fever  Denies any headaches, ear pain, nasal congestion, rhinorrhea, cough, sneezing, chest pain, shortness of breath, abdominal pain, dysuria  Patient denies any recent sick contacts  Patient was never smoker  States she had similar symptoms in April, and went to the emergency room  Was given steroid and Toradol and states that this helped with her symptoms  The following portions of the patient's history were reviewed and updated as appropriate:   She  has a past medical history of Fibroid    She   Patient Active Problem List    Diagnosis Date Noted    Encounter for annual routine gynecological examination 2018    History of  section 2018    History of myomectomy 2018     She  has a past surgical history that includes Myomectomy and  section  Her family history includes Dementia in her maternal grandmother; Diabetes in her paternal grandfather; Hypertension in her father, mother, and sister  She  reports that she has never smoked  She has never used smokeless tobacco  She reports that she does not drink alcohol or use drugs  Current Outpatient Medications   Medication Sig Dispense Refill    ibuprofen (MOTRIN) 200 mg tablet Take 200 mg by mouth every 6 (six) hours as needed for mild pain      methylPREDNISolone 4 MG tablet therapy pack Use as directed on package 21 each 0     No current facility-administered medications for this visit  She has No Known Allergies       Review of Systems   Constitutional: Positive for fever  Negative for activity change, appetite change, chills, diaphoresis, fatigue and unexpected weight change  HENT: Positive for sore throat  Negative for congestion, ear pain, postnasal drip and rhinorrhea  Eyes: Negative for visual disturbance  Respiratory: Negative for cough, chest tightness, shortness of breath and wheezing  Cardiovascular: Negative for chest pain, palpitations and leg swelling  Gastrointestinal: Negative for abdominal pain, diarrhea, nausea and vomiting  Genitourinary: Negative for dysuria and hematuria  Musculoskeletal: Positive for myalgias  Neurological: Negative for dizziness, weakness, light-headedness and headaches  Psychiatric/Behavioral: Negative for dysphoric mood and sleep disturbance  The patient is not nervous/anxious            Objective:      /74 (BP Location: Right arm, Patient Position: Sitting, Cuff Size: Standard)   Pulse 103   Temp 98 °F (36 7 °C) (Temporal)   Resp 16   Wt 73 5 kg (162 lb)   SpO2 98%   BMI 29 63 kg/m²          Physical Exam   Constitutional: She is oriented to person, place, and time  She appears well-developed and well-nourished  No distress  HENT:   Right Ear: Tympanic membrane, external ear and ear canal normal    Left Ear: Tympanic membrane, external ear and ear canal normal    Nose: Nose normal    Mouth/Throat: Posterior oropharyngeal erythema present  Eyes: Pupils are equal, round, and reactive to light  Conjunctivae and EOM are normal    Cardiovascular: Normal rate, regular rhythm and normal heart sounds  Exam reveals no gallop and no friction rub  No murmur heard  Pulmonary/Chest: Effort normal and breath sounds normal  No respiratory distress  She has no wheezes  She has no rales  Abdominal: Soft  Bowel sounds are normal  She exhibits no distension  There is no tenderness  There is no rebound and no guarding  Lymphadenopathy:     She has no cervical adenopathy  Neurological: She is alert and oriented to person, place, and time  No cranial nerve deficit  Skin: She is not diaphoretic  Nursing note and vitals reviewed

## 2019-09-26 ENCOUNTER — APPOINTMENT (OUTPATIENT)
Dept: LAB | Facility: HOSPITAL | Age: 34
End: 2019-09-26
Payer: COMMERCIAL

## 2019-09-26 DIAGNOSIS — R52 BODY ACHES: ICD-10-CM

## 2019-09-26 LAB
ALBUMIN SERPL BCP-MCNC: 3.8 G/DL (ref 3.5–5)
ALP SERPL-CCNC: 66 U/L (ref 46–116)
ALT SERPL W P-5'-P-CCNC: 40 U/L (ref 12–78)
ANION GAP SERPL CALCULATED.3IONS-SCNC: 6 MMOL/L (ref 4–13)
AST SERPL W P-5'-P-CCNC: 58 U/L (ref 5–45)
BACTERIA UR QL AUTO: ABNORMAL /HPF
BASOPHILS # BLD MANUAL: 0 THOUSAND/UL (ref 0–0.1)
BASOPHILS NFR MAR MANUAL: 0 % (ref 0–1)
BILIRUB SERPL-MCNC: 0.32 MG/DL (ref 0.2–1)
BILIRUB UR QL STRIP: NEGATIVE
BUN SERPL-MCNC: 12 MG/DL (ref 5–25)
CALCIUM SERPL-MCNC: 9.2 MG/DL (ref 8.3–10.1)
CHLORIDE SERPL-SCNC: 106 MMOL/L (ref 100–108)
CHOLEST SERPL-MCNC: 171 MG/DL (ref 50–200)
CLARITY UR: CLEAR
CO2 SERPL-SCNC: 27 MMOL/L (ref 21–32)
COLOR UR: YELLOW
CREAT SERPL-MCNC: 0.77 MG/DL (ref 0.6–1.3)
EOSINOPHIL # BLD MANUAL: 0 THOUSAND/UL (ref 0–0.4)
EOSINOPHIL NFR BLD MANUAL: 0 % (ref 0–6)
ERYTHROCYTE [DISTWIDTH] IN BLOOD BY AUTOMATED COUNT: 19.4 % (ref 11.6–15.1)
GFR SERPL CREATININE-BSD FRML MDRD: 117 ML/MIN/1.73SQ M
GLUCOSE P FAST SERPL-MCNC: 80 MG/DL (ref 65–99)
GLUCOSE UR STRIP-MCNC: NEGATIVE MG/DL
HCT VFR BLD AUTO: 34.2 % (ref 34.8–46.1)
HDLC SERPL-MCNC: 34 MG/DL (ref 40–60)
HGB BLD-MCNC: 10.8 G/DL (ref 11.5–15.4)
HGB UR QL STRIP.AUTO: NEGATIVE
HYALINE CASTS #/AREA URNS LPF: ABNORMAL /LPF
KETONES UR STRIP-MCNC: NEGATIVE MG/DL
LDLC SERPL CALC-MCNC: 114 MG/DL (ref 0–100)
LEUKOCYTE ESTERASE UR QL STRIP: NEGATIVE
LYMPHOCYTES # BLD AUTO: 0.62 THOUSAND/UL (ref 0.6–4.47)
LYMPHOCYTES # BLD AUTO: 23 % (ref 14–44)
MCH RBC QN AUTO: 26 PG (ref 26.8–34.3)
MCHC RBC AUTO-ENTMCNC: 31.6 G/DL (ref 31.4–37.4)
MCV RBC AUTO: 82 FL (ref 82–98)
METAMYELOCYTES NFR BLD MANUAL: 2 % (ref 0–1)
MONOCYTES # BLD AUTO: 0.19 THOUSAND/UL (ref 0–1.22)
MONOCYTES NFR BLD: 7 % (ref 4–12)
NEUTROPHILS # BLD MANUAL: 1.49 THOUSAND/UL (ref 1.85–7.62)
NEUTS BAND NFR BLD MANUAL: 7 % (ref 0–8)
NEUTS SEG NFR BLD AUTO: 48 % (ref 43–75)
NITRITE UR QL STRIP: NEGATIVE
NON-SQ EPI CELLS URNS QL MICRO: ABNORMAL /HPF
NONHDLC SERPL-MCNC: 137 MG/DL
NRBC BLD AUTO-RTO: 0 /100 WBCS
OVALOCYTES BLD QL SMEAR: PRESENT
PH UR STRIP.AUTO: 5.5 [PH]
PLATELET # BLD AUTO: 212 THOUSANDS/UL (ref 149–390)
PLATELET BLD QL SMEAR: ADEQUATE
PMV BLD AUTO: 10.5 FL (ref 8.9–12.7)
POTASSIUM SERPL-SCNC: 3.7 MMOL/L (ref 3.5–5.3)
PROT SERPL-MCNC: 7.3 G/DL (ref 6.4–8.2)
PROT UR STRIP-MCNC: ABNORMAL MG/DL
RBC # BLD AUTO: 4.15 MILLION/UL (ref 3.81–5.12)
RBC #/AREA URNS AUTO: ABNORMAL /HPF
RBC MORPH BLD: PRESENT
SODIUM SERPL-SCNC: 139 MMOL/L (ref 136–145)
SP GR UR STRIP.AUTO: 1.02 (ref 1–1.03)
TRIGL SERPL-MCNC: 116 MG/DL
TSH SERPL DL<=0.05 MIU/L-ACNC: 1.52 UIU/ML (ref 0.36–3.74)
UROBILINOGEN UR QL STRIP.AUTO: 0.2 E.U./DL
VARIANT LYMPHS # BLD AUTO: 13 %
WBC # BLD AUTO: 2.7 THOUSAND/UL (ref 4.31–10.16)
WBC #/AREA URNS AUTO: ABNORMAL /HPF

## 2019-09-26 PROCEDURE — 84443 ASSAY THYROID STIM HORMONE: CPT

## 2019-09-26 PROCEDURE — 80053 COMPREHEN METABOLIC PANEL: CPT

## 2019-09-26 PROCEDURE — 85007 BL SMEAR W/DIFF WBC COUNT: CPT

## 2019-09-26 PROCEDURE — 85027 COMPLETE CBC AUTOMATED: CPT

## 2019-09-26 PROCEDURE — 36415 COLL VENOUS BLD VENIPUNCTURE: CPT

## 2019-09-26 PROCEDURE — 80061 LIPID PANEL: CPT

## 2019-09-26 PROCEDURE — 86618 LYME DISEASE ANTIBODY: CPT

## 2019-09-26 PROCEDURE — 81001 URINALYSIS AUTO W/SCOPE: CPT

## 2019-09-27 LAB — B BURGDOR IGG+IGM SER-ACNC: <0.91 ISR (ref 0–0.9)

## 2019-10-02 DIAGNOSIS — D70.9 NEUTROPENIA, UNSPECIFIED TYPE (HCC): Primary | ICD-10-CM

## 2019-10-08 ENCOUNTER — TELEPHONE (OUTPATIENT)
Dept: FAMILY MEDICINE CLINIC | Facility: CLINIC | Age: 34
End: 2019-10-08

## 2019-10-08 NOTE — TELEPHONE ENCOUNTER
Pt contacted office stating she received a vm stating to return phone call  Regarding test results  I informed pt I didn't see any calls made from our office, lab test are still active, witch pt stated had bw done on 10/3 at the [facility] and xray as well  Can one of you's please verify? ! Thank You!

## 2019-10-08 NOTE — TELEPHONE ENCOUNTER
Gave patient her results:      Notes recorded by Holger Myles PA-C on 10/2/2019 at 3:58 PM EDT  Lab work came back normal with the exception of white blood cell count was low   There could be several causes for this including infections, autoimmune disorders, or may even be normal   Ordered additional lab work for further evaluation   Cholesterol was also slightly elevated   Recommend decreasing fatty and greasy foods, choosing lean meats such as fish and chicken, eating foods high in fiber   Will continue to monitor

## 2019-10-08 NOTE — TELEPHONE ENCOUNTER
Yes there are lab results in the system from 09/26/19 for the pt  Carole Delgado recently on 10/02 ordered the additional lab work that is still active for the pt to complete  I will contact pt again with the results, thank you!

## 2019-10-11 ENCOUNTER — TRANSCRIBE ORDERS (OUTPATIENT)
Dept: LAB | Facility: HOSPITAL | Age: 34
End: 2019-10-11

## 2019-10-11 ENCOUNTER — APPOINTMENT (OUTPATIENT)
Dept: LAB | Facility: HOSPITAL | Age: 34
End: 2019-10-11
Payer: COMMERCIAL

## 2019-10-11 DIAGNOSIS — D70.9 NEUTROPENIA, UNSPECIFIED TYPE (HCC): ICD-10-CM

## 2019-10-11 LAB
CRP SERPL QL: <3 MG/L
ERYTHROCYTE [SEDIMENTATION RATE] IN BLOOD: 28 MM/HOUR (ref 0–20)
FERRITIN SERPL-MCNC: 15 NG/ML (ref 8–388)
FOLATE SERPL-MCNC: 17.3 NG/ML (ref 3.1–17.5)
HBV CORE AB SER QL: NORMAL
HBV CORE IGM SER QL: NORMAL
HBV SURFACE AG SER QL: NORMAL
HCV AB SER QL: NORMAL
IRON SERPL-MCNC: 46 UG/DL (ref 50–170)
TIBC SERPL-MCNC: 433 UG/DL (ref 250–450)
VIT B12 SERPL-MCNC: 749 PG/ML (ref 100–900)

## 2019-10-11 PROCEDURE — 85652 RBC SED RATE AUTOMATED: CPT

## 2019-10-11 PROCEDURE — 86803 HEPATITIS C AB TEST: CPT

## 2019-10-11 PROCEDURE — 82746 ASSAY OF FOLIC ACID SERUM: CPT

## 2019-10-11 PROCEDURE — 83550 IRON BINDING TEST: CPT

## 2019-10-11 PROCEDURE — 82728 ASSAY OF FERRITIN: CPT

## 2019-10-11 PROCEDURE — 86705 HEP B CORE ANTIBODY IGM: CPT

## 2019-10-11 PROCEDURE — 87340 HEPATITIS B SURFACE AG IA: CPT

## 2019-10-11 PROCEDURE — 83540 ASSAY OF IRON: CPT

## 2019-10-11 PROCEDURE — 86038 ANTINUCLEAR ANTIBODIES: CPT

## 2019-10-11 PROCEDURE — 86430 RHEUMATOID FACTOR TEST QUAL: CPT

## 2019-10-11 PROCEDURE — 36415 COLL VENOUS BLD VENIPUNCTURE: CPT

## 2019-10-11 PROCEDURE — 87389 HIV-1 AG W/HIV-1&-2 AB AG IA: CPT

## 2019-10-11 PROCEDURE — 82607 VITAMIN B-12: CPT

## 2019-10-11 PROCEDURE — 86140 C-REACTIVE PROTEIN: CPT

## 2019-10-11 PROCEDURE — 86704 HEP B CORE ANTIBODY TOTAL: CPT

## 2019-10-11 PROCEDURE — 86431 RHEUMATOID FACTOR QUANT: CPT

## 2019-10-12 LAB
CRYOGLOB RF SER-ACNC: ABNORMAL [IU]/ML
RHEUMATOID FACT SER QL LA: POSITIVE

## 2019-10-13 LAB — HIV 1+2 AB+HIV1 P24 AG SERPL QL IA: NORMAL

## 2019-10-14 LAB — RYE IGE QN: NEGATIVE

## 2019-10-15 ENCOUNTER — TELEPHONE (OUTPATIENT)
Dept: FAMILY MEDICINE CLINIC | Facility: CLINIC | Age: 34
End: 2019-10-15

## 2019-10-15 DIAGNOSIS — R76.8 RHEUMATOID FACTOR POSITIVE: Primary | ICD-10-CM

## 2019-10-15 DIAGNOSIS — R52 BODY ACHES: ICD-10-CM

## 2019-10-29 ENCOUNTER — TELEPHONE (OUTPATIENT)
Dept: FAMILY MEDICINE CLINIC | Facility: CLINIC | Age: 34
End: 2019-10-29

## 2019-10-29 NOTE — TELEPHONE ENCOUNTER
LM on VM    Rheum apt is on 12/16/2019 at 2 pm at [address], [address], [place]

## 2019-11-09 ENCOUNTER — HOSPITAL ENCOUNTER (EMERGENCY)
Facility: HOSPITAL | Age: 34
Discharge: HOME | End: 2019-11-10
Attending: EMERGENCY MEDICINE | Admitting: EMERGENCY MEDICINE
Payer: COMMERCIAL

## 2019-11-09 VITALS
WEIGHT: 168.43 LBS | DIASTOLIC BLOOD PRESSURE: 79 MMHG | TEMPERATURE: 97.2 F | SYSTOLIC BLOOD PRESSURE: 127 MMHG | HEART RATE: 77 BPM | RESPIRATION RATE: 20 BRPM | OXYGEN SATURATION: 100 % | BODY MASS INDEX: 30.81 KG/M2

## 2019-11-09 DIAGNOSIS — Z3A.01 LESS THAN 8 WEEKS GESTATION OF PREGNANCY: ICD-10-CM

## 2019-11-09 DIAGNOSIS — M79.661 RIGHT CALF PAIN: Primary | ICD-10-CM

## 2019-11-09 LAB
ALBUMIN SERPL BCP-MCNC: 3.4 G/DL (ref 3.5–5)
ALP SERPL-CCNC: 57 U/L (ref 46–116)
ALT SERPL W P-5'-P-CCNC: 14 U/L (ref 12–78)
ANION GAP SERPL CALCULATED.3IONS-SCNC: 7 MMOL/L (ref 4–13)
AST SERPL W P-5'-P-CCNC: 17 U/L (ref 5–45)
BASOPHILS # BLD AUTO: 0.02 THOUSANDS/ΜL (ref 0–0.1)
BASOPHILS NFR BLD AUTO: 0 % (ref 0–1)
BILIRUB SERPL-MCNC: 0.19 MG/DL (ref 0.2–1)
BUN SERPL-MCNC: 13 MG/DL (ref 5–25)
CALCIUM SERPL-MCNC: 9.3 MG/DL (ref 8.3–10.1)
CHLORIDE SERPL-SCNC: 101 MMOL/L (ref 100–108)
CO2 SERPL-SCNC: 28 MMOL/L (ref 21–32)
CREAT SERPL-MCNC: 0.93 MG/DL (ref 0.6–1.3)
D DIMER PPP FEU-MCNC: 1.04 UG/ML FEU
EOSINOPHIL # BLD AUTO: 0.08 THOUSAND/ΜL (ref 0–0.61)
EOSINOPHIL NFR BLD AUTO: 1 % (ref 0–6)
ERYTHROCYTE [DISTWIDTH] IN BLOOD BY AUTOMATED COUNT: 17.2 % (ref 11.6–15.1)
GFR SERPL CREATININE-BSD FRML MDRD: 92 ML/MIN/1.73SQ M
GLUCOSE SERPL-MCNC: 103 MG/DL (ref 65–140)
HCT VFR BLD AUTO: 32 % (ref 34.8–46.1)
HGB BLD-MCNC: 10 G/DL (ref 11.5–15.4)
IMM GRANULOCYTES # BLD AUTO: 0.02 THOUSAND/UL (ref 0–0.2)
IMM GRANULOCYTES NFR BLD AUTO: 0 % (ref 0–2)
LYMPHOCYTES # BLD AUTO: 3.27 THOUSANDS/ΜL (ref 0.6–4.47)
LYMPHOCYTES NFR BLD AUTO: 45 % (ref 14–44)
MCH RBC QN AUTO: 26.1 PG (ref 26.8–34.3)
MCHC RBC AUTO-ENTMCNC: 31.3 G/DL (ref 31.4–37.4)
MCV RBC AUTO: 84 FL (ref 82–98)
MONOCYTES # BLD AUTO: 0.63 THOUSAND/ΜL (ref 0.17–1.22)
MONOCYTES NFR BLD AUTO: 9 % (ref 4–12)
NEUTROPHILS # BLD AUTO: 3.26 THOUSANDS/ΜL (ref 1.85–7.62)
NEUTS SEG NFR BLD AUTO: 45 % (ref 43–75)
NRBC BLD AUTO-RTO: 0 /100 WBCS
PLATELET # BLD AUTO: 330 THOUSANDS/UL (ref 149–390)
PMV BLD AUTO: 9.2 FL (ref 8.9–12.7)
POTASSIUM SERPL-SCNC: 3.7 MMOL/L (ref 3.5–5.3)
PROT SERPL-MCNC: 7.8 G/DL (ref 6.4–8.2)
RBC # BLD AUTO: 3.83 MILLION/UL (ref 3.81–5.12)
SODIUM SERPL-SCNC: 136 MMOL/L (ref 136–145)
WBC # BLD AUTO: 7.28 THOUSAND/UL (ref 4.31–10.16)

## 2019-11-09 PROCEDURE — 36415 COLL VENOUS BLD VENIPUNCTURE: CPT | Performed by: NURSE PRACTITIONER

## 2019-11-09 PROCEDURE — 80053 COMPREHEN METABOLIC PANEL: CPT | Performed by: NURSE PRACTITIONER

## 2019-11-09 PROCEDURE — 99285 EMERGENCY DEPT VISIT HI MDM: CPT | Performed by: NURSE PRACTITIONER

## 2019-11-09 PROCEDURE — 85025 COMPLETE CBC W/AUTO DIFF WBC: CPT | Performed by: NURSE PRACTITIONER

## 2019-11-09 PROCEDURE — 99284 EMERGENCY DEPT VISIT MOD MDM: CPT

## 2019-11-09 PROCEDURE — 85379 FIBRIN DEGRADATION QUANT: CPT | Performed by: NURSE PRACTITIONER

## 2019-11-09 RX ORDER — ACETAMINOPHEN 325 MG/1
975 TABLET ORAL ONCE
Status: COMPLETED | OUTPATIENT
Start: 2019-11-09 | End: 2019-11-09

## 2019-11-09 RX ADMIN — ACETAMINOPHEN 975 MG: 325 TABLET ORAL at 23:16

## 2019-11-10 PROCEDURE — 96372 THER/PROPH/DIAG INJ SC/IM: CPT

## 2019-11-10 RX ADMIN — ENOXAPARIN SODIUM 80 MG: 80 INJECTION SUBCUTANEOUS at 00:14

## 2019-11-10 NOTE — DISCHARGE INSTRUCTIONS
You have been given a prescription for DVT Study on Monday  They will call you Monday morning with an appointment   You are only to take tylenol for pain  You have been given 1 dose of lovenox for clot prevention  You are to follow up with your PCP  Results will go to the ED if positive test   Results will go to your doctor if they are negative

## 2019-11-10 NOTE — ED PROVIDER NOTES
History  Chief Complaint   Patient presents with    Leg Pain     right leg pain in calf starting approx 3 days ago  no trauma or injury  denies recent travel/surgeries  distal CMS intact  states worse when sitting for a long time  This is 27year old female who states has had right calf pain since April  She denies any injury  She states that she recently seen her PCP and was told her she had + Sedrate and thinks she may have RA due to other joint pain and she is to see rheumatology in December  She states she has been taking ibuprofen for pain  She denies any hx of DVT or PE  She denies recent travel  She states pain is worse with sitting for a long period of time  She states she is pregnant LMP 19 and has not seen her OB yet, she denies any pregnancy issues today  She states she became scared because the pain is getting worse so she decided to come to the ED  History provided by:  Patient and medical records   used: No    Leg Pain   Location:  Leg  Time since incident:  7 months  Injury: no    Leg location:  R leg  Pain details:     Quality:  Cramping and aching    Severity:  Moderate    Timing:  Constant    Progression:  Worsening  Chronicity:  Chronic  Dislocation: no    Relieved by:  Nothing  Ineffective treatments:  NSAIDs      Prior to Admission Medications   Prescriptions Last Dose Informant Patient Reported?  Taking?   ibuprofen (MOTRIN) 200 mg tablet 2019 at 2030 Self Yes Yes   Sig: Take 200 mg by mouth every 6 (six) hours as needed for mild pain      Facility-Administered Medications: None       Past Medical History:   Diagnosis Date    Fibroid        Past Surgical History:   Procedure Laterality Date     SECTION      MYOMECTOMY         Family History   Problem Relation Age of Onset    Hypertension Mother     Hypertension Father     Hypertension Sister     Dementia Maternal Grandmother     Diabetes Paternal Grandfather      I have reviewed and agree with the history as documented  Social History     Tobacco Use    Smoking status: Never Smoker    Smokeless tobacco: Never Used   Substance Use Topics    Alcohol use: No    Drug use: No        Review of Systems   Constitutional: Negative  HENT: Negative  Eyes: Negative  Respiratory: Negative  Cardiovascular: Negative  Gastrointestinal: Negative  Endocrine: Negative  Genitourinary: Negative  Musculoskeletal:        Right calf pain    Skin: Negative  Allergic/Immunologic: Negative  Neurological: Negative  Hematological: Negative  Psychiatric/Behavioral: Negative  Physical Exam  Physical Exam   Constitutional: She is oriented to person, place, and time  She appears well-developed and well-nourished  No distress  HENT:   Head: Normocephalic and atraumatic  Eyes: Pupils are equal, round, and reactive to light  EOM are normal    Neck: Normal range of motion  Neck supple  Cardiovascular: Normal rate, regular rhythm and normal heart sounds  Pulmonary/Chest: Effort normal and breath sounds normal    Abdominal: Soft  Bowel sounds are normal    Musculoskeletal: Normal range of motion  She exhibits no edema, tenderness or deformity  Neurological: She is alert and oriented to person, place, and time  Skin: Skin is warm and dry  Capillary refill takes less than 2 seconds  She is not diaphoretic  No erythema  Right calf:  No edema, redness, warmth  Negative trina's sign  No TTP    Psychiatric: She has a normal mood and affect  Her behavior is normal  Judgment and thought content normal    Nursing note and vitals reviewed        Vital Signs  ED Triage Vitals [11/09/19 2241]   Temperature Pulse Respirations Blood Pressure SpO2   (!) 97 2 °F (36 2 °C) 77 20 127/79 100 %      Temp Source Heart Rate Source Patient Position - Orthostatic VS BP Location FiO2 (%)   Temporal Monitor Sitting Right arm --      Pain Score       No Pain           Vitals:    11/09/19 2241 BP: 127/79   Pulse: 77   Patient Position - Orthostatic VS: Sitting         Visual Acuity      ED Medications  Medications   acetaminophen (TYLENOL) tablet 975 mg (975 mg Oral Given 11/9/19 2316)   enoxaparin (LOVENOX) subcutaneous injection 80 mg (80 mg Subcutaneous Given 11/10/19 0014)       Diagnostic Studies  Results Reviewed     Procedure Component Value Units Date/Time    Comprehensive metabolic panel [[id number]]  (Abnormal) Collected:  11/09/19 2321    Lab Status:  Final result Specimen:  Blood from Arm, Left Updated:  11/09/19 2346     Sodium 136 mmol/L      Potassium 3 7 mmol/L      Chloride 101 mmol/L      CO2 28 mmol/L      ANION GAP 7 mmol/L      BUN 13 mg/dL      Creatinine 0 93 mg/dL      Glucose 103 mg/dL      Calcium 9 3 mg/dL      AST 17 U/L      ALT 14 U/L      Alkaline Phosphatase 57 U/L      Total Protein 7 8 g/dL      Albumin 3 4 g/dL      Total Bilirubin 0 19 mg/dL      eGFR 92 ml/min/1 73sq m     Narrative:       [place] guidelines for Chronic Kidney Disease (CKD):     Stage 1 with normal or high GFR (GFR > 90 mL/min/1 73 square meters)    Stage 2 Mild CKD (GFR = 60-89 mL/min/1 73 square meters)    Stage 3A Moderate CKD (GFR = 45-59 mL/min/1 73 square meters)    Stage 3B Moderate CKD (GFR = 30-44 mL/min/1 73 square meters)    Stage 4 Severe CKD (GFR = 15-29 mL/min/1 73 square meters)    Stage 5 End Stage CKD (GFR <15 mL/min/1 73 square meters)  Note: GFR calculation is accurate only with a steady state creatinine    D-Dimer [[id number]]  (Abnormal) Collected:  11/09/19 2321    Lab Status:  Final result Specimen:  Blood from Arm, Left Updated:  11/09/19 2340     D-Dimer, Quant 1 04 ug/ml FEU     CBC and differential [[id number]]  (Abnormal) Collected:  11/09/19 2321    Lab Status:  Final result Specimen:  Blood from Arm, Left Updated:  11/09/19 2328     WBC 7 28 Thousand/uL      RBC 3 83 Million/uL      Hemoglobin 10 0 g/dL      Hematocrit 32 0 %      MCV 84 fL MCH 26 1 pg      MCHC 31 3 g/dL      RDW 17 2 %      MPV 9 2 fL      Platelets 765 Thousands/uL      nRBC 0 /100 WBCs      Neutrophils Relative 45 %      Immat GRANS % 0 %      Lymphocytes Relative 45 %      Monocytes Relative 9 %      Eosinophils Relative 1 %      Basophils Relative 0 %      Neutrophils Absolute 3 26 Thousands/µL      Immature Grans Absolute 0 02 Thousand/uL      Lymphocytes Absolute 3 27 Thousands/µL      Monocytes Absolute 0 63 Thousand/µL      Eosinophils Absolute 0 08 Thousand/µL      Basophils Absolute 0 02 Thousands/µL                  VAS lower limb venous duplex study, unilateral/limited    (Results Pending)              Procedures  Procedures       ED Course  ED Course as of Nov 10 0057   Sat Nov 09, 2019   2341 D-Dimer, Quant(!): 1 04   2343 D dimer 1 04 - is elevated, will be in pregnancy however pt is early pregnancy and thoughts were that it would be negative due to early pregnancy  Hgb 10   1 month ago was 10 8  waiting on CmP  I have discussed lovenox treatment until R/O DVT and pt agrees with POC        2345 Hemoglobin(!): 10 0   2356 All labs reviewed and discussed with pt  Cr and GFR stable for lovenox  2358 Vascular lab called and message was left for them to call patient and schedule appointment  Pt verbalizes understanding of all dc instructions and follow up                                   MDM  Number of Diagnoses or Management Options  Diagnosis management comments: Right calf pain - doubt DVT as pt has had since April with no clinical signs of DVT  Pt recently found out she is pregnant  Although D dimer may be + due to pregnancy pt may be earlier enough to have negative D dimer to rule out DVT  Pt may need anticoagulation prior to the availability of DVT Study on Monday       Plan  Labs         Amount and/or Complexity of Data Reviewed  Clinical lab tests: ordered and reviewed  Review and summarize past medical records: yes  Discuss the patient with other providers: (Dr Bowles Sat )        Disposition  Final diagnoses:   Right calf pain   Less than 8 weeks gestation of pregnancy     Time reflects when diagnosis was documented in both MDM as applicable and the Disposition within this note     Time User Action Codes Description Comment    11/9/2019 11:46 PM [place] Candido Add [F21 476] Right calf pain     11/9/2019 11:46 PM [place] Candido Add [Z3A 01] Less than 8 weeks gestation of pregnancy       ED Disposition     ED Disposition Condition Date/Time Comment    Discharge Stable Sat Nov 9, 2019 11:47 PM Laly Gómez discharge to home/self care  Follow-up Information     Follow up With Specialties Details Why Contact Info Additional Information    Margot Navarrete PA-C Family Medicine, Physician Assistant Schedule an appointment as soon as possible for a visit in 2 days  [address]  [address] Emergency Department Emergency Medicine  If symptoms worsen [facility] [address]  [phone] [place] [facility], [address] [place] [address], [id number]          Discharge Medication List as of 11/9/2019 11:56 PM      CONTINUE these medications which have NOT CHANGED    Details   ibuprofen (MOTRIN) 200 mg tablet Take 200 mg by mouth every 6 (six) hours as needed for mild pain, Historical Med           Outpatient Discharge Orders   VAS lower limb venous duplex study, unilateral/limited   Standing Status: Future Standing Exp   Date: 11/15/19       ED Provider  Electronically Signed by           Bouchra Zavaleta  11/10/19 4858

## 2019-11-10 NOTE — ED NOTES
Pt states she has had pain in right calf intermittently since April  In the last 3 days, pain has worsened and pt states it is difficult to sleep at night  Pt also states she took a home pregnancy test which was positive  ER provider did tell pt just now that she should not be taking Ibuprofen while pregnant  Pt has not yet had pre-kim care        Shanique Chavarria RN  [phone]

## 2019-11-11 ENCOUNTER — HOSPITAL ENCOUNTER (OUTPATIENT)
Dept: NON INVASIVE DIAGNOSTICS | Facility: HOSPITAL | Age: 34
Discharge: HOME | End: 2019-11-11
Payer: COMMERCIAL

## 2019-11-11 DIAGNOSIS — M79.661 RIGHT CALF PAIN: ICD-10-CM

## 2019-11-11 PROCEDURE — 93971 EXTREMITY STUDY: CPT

## 2019-11-12 ENCOUNTER — ANNUAL EXAM (OUTPATIENT)
Dept: GYNECOLOGY | Facility: CLINIC | Age: 34
End: 2019-11-12
Payer: COMMERCIAL

## 2019-11-12 VITALS
SYSTOLIC BLOOD PRESSURE: 100 MMHG | HEIGHT: 63 IN | WEIGHT: 164.2 LBS | BODY MASS INDEX: 29.09 KG/M2 | DIASTOLIC BLOOD PRESSURE: 60 MMHG | HEART RATE: 94 BPM

## 2019-11-12 DIAGNOSIS — N92.0 MENORRHAGIA WITH REGULAR CYCLE: ICD-10-CM

## 2019-11-12 DIAGNOSIS — Z98.891 HISTORY OF CESAREAN SECTION: ICD-10-CM

## 2019-11-12 DIAGNOSIS — N92.6 MISSED PERIODS: ICD-10-CM

## 2019-11-12 DIAGNOSIS — Z98.890 HISTORY OF MYOMECTOMY: ICD-10-CM

## 2019-11-12 DIAGNOSIS — Z01.419 ENCOUNTER FOR ANNUAL ROUTINE GYNECOLOGICAL EXAMINATION: Primary | ICD-10-CM

## 2019-11-12 LAB — SL AMB POCT URINE HCG: POSITIVE

## 2019-11-12 PROCEDURE — 99395 PREV VISIT EST AGE 18-39: CPT | Performed by: OBSTETRICS & GYNECOLOGY

## 2019-11-12 PROCEDURE — 93971 EXTREMITY STUDY: CPT | Performed by: SURGERY

## 2019-11-12 PROCEDURE — 81025 URINE PREGNANCY TEST: CPT | Performed by: OBSTETRICS & GYNECOLOGY

## 2019-11-12 RX ORDER — FOLIC ACID 1 MG/1
1 TABLET ORAL DAILY
Qty: 90 TABLET | Refills: 3 | Status: SHIPPED | OUTPATIENT
Start: 2019-11-12 | End: 2020-06-10 | Stop reason: HOSPADM

## 2019-11-12 NOTE — PROGRESS NOTES
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  Recommendation was to have her family completed within a few years of the myomectomy  (+) RF and intermittent myalgia - to follow up with rheumatology in 2019  Co Testing q 3yrs-   RTO 1yr  SBE monthly  Exercise 3/wk  Calcium 1,000 mg/d with Vit D            Subjective:        Patient ID: Lele Reed is a 27 y o  female  HPI     Patient is a 28 y/o F  with past medical history of fibroid (s/p myomectomy), menorrhagia, and  presenting for missed period  The patient states that the first day of her last period was 2019 and has since not yet another cycle of menstruation  Patient states that her menses are regular and 30 days apart every month  Patient states that she took a home pregnancy test which was positive  The patient currently denies any loss of fluid, pelvic or abdominal pain, vaginal bleeding or discharge  Patient states that she has been taking 657 mg of folic acid since she found out she was pregnant  She states that her first child was born via CS due to the concerns for her uterine rupture secondary to fibroids  Patient states that she has been experiencing intermittent myalgia since April of this year that is alleviated with Toradol  She states that she was evaluated in the [facility] ED yesterday for a right sided calf pain that has persisted for the past 3 days  The patient states that she was told that her elevated D-dimer was due to her pregnancy and was discharged home after 1 dose of Lovenox  Duplex of R calf was done but the patient has not received the result  Patient currently denies any calf pain, chest pain, shortness of breath   She does admit to chronic intermittent palpitations that she states has been present since the birth of her first child and had been worked up with normal results  The following portions of the patient's history were reviewed and updated as appropriate: She  has a past medical history of Fibroid  Patient Active Problem List    Diagnosis Date Noted    Encounter for annual routine gynecological examination 2018    History of  section 2018    History of myomectomy 2018   PMH:   Menarche 12  Primary infertility - HSG Dx submucous fibroids , Submucous Myomectomy (2, others present and observed)   Waited till  to attempt conception  Chest Pain  - nl Holter and Echo  C/S at 37 wks due to myomectomy 4/15  Menorrhagia- since delivery  She  has a past surgical history that includes Myomectomy and  section  Her family history includes Dementia in her maternal grandmother; Diabetes in her paternal grandfather; Hypertension in her father, mother, and sister  She  reports that she has never smoked  She has never used smokeless tobacco  She reports that she does not drink alcohol or use drugs  SH:   PCA P8, currently in nursing school  From [facility], moved to [address] [place] [address] [address]    '08  Current Outpatient Medications   Medication Sig Dispense Refill    ibuprofen (MOTRIN) 200 mg tablet Take 200 mg by mouth every 6 (six) hours as needed for mild pain       No current facility-administered medications for this visit  Current Outpatient Medications on File Prior to Visit   Medication Sig    ibuprofen (MOTRIN) 200 mg tablet Take 200 mg by mouth every 6 (six) hours as needed for mild pain     No current facility-administered medications on file prior to visit  She has No Known Allergies       Review of Systems   Constitutional: Negative for chills and fever  Respiratory: Negative for shortness of breath  Cardiovascular: Negative for chest pain, palpitations and leg swelling     Gastrointestinal: Negative for abdominal pain, nausea and vomiting  Genitourinary: Negative for pelvic pain, vaginal bleeding and vaginal discharge  Musculoskeletal: Positive for myalgias (Right calf, intermittent, present for the past 3 days)  Negative for arthralgias  Neurological: Negative for weakness  Objective: There were no vitals filed for this visit  Physical Exam   Constitutional: She is oriented to person, place, and time  She appears well-developed and well-nourished  No distress  HENT:   Head: Normocephalic and atraumatic  Eyes: Conjunctivae and EOM are normal    Neck: Normal range of motion  Neck supple  No thyromegaly present  Cardiovascular: Normal rate, regular rhythm and normal heart sounds  Pulmonary/Chest: Effort normal and breath sounds normal  No respiratory distress  Abdominal: Soft  There is no tenderness  There is no guarding  Gravid uterus   Genitourinary: Vagina normal  Pelvic exam was performed with patient supine  There is no rash, tenderness, lesion or injury on the right labia  There is no rash, tenderness, lesion or injury on the left labia  Uterus is enlarged  Uterus is not deviated, not fixed and not tender  Cervix exhibits no motion tenderness, no discharge and no friability  Right adnexum displays no mass, no tenderness and no fullness  Left adnexum displays no mass, no tenderness and no fullness  No erythema or bleeding in the vagina  No signs of injury around the vagina  No vaginal discharge found  Genitourinary Comments: Small fibroids appreciated on bimanual exam  Uterus enlarged above the 8 weeks gestational age  Musculoskeletal: She exhibits no edema or tenderness  Neurological: She is alert and oriented to person, place, and time  Skin: Skin is warm and dry  She is not diaphoretic  Psychiatric: She has a normal mood and affect  Her behavior is normal  Judgment and thought content normal    Nursing note and vitals reviewed

## 2019-11-15 ENCOUNTER — TELEPHONE (OUTPATIENT)
Dept: OBGYN CLINIC | Facility: MEDICAL CENTER | Age: 34
End: 2019-11-15

## 2019-11-19 ENCOUNTER — TELEPHONE (OUTPATIENT)
Dept: OBGYN CLINIC | Facility: MEDICAL CENTER | Age: 34
End: 2019-11-19

## 2019-11-19 DIAGNOSIS — Z32.01 POSITIVE PREGNANCY TEST: Primary | ICD-10-CM

## 2019-11-19 NOTE — TELEPHONE ENCOUNTER
Positive UPT  LMP=10/13/19  Formal dating US ordered    Will schedule OB ED visit pending that result

## 2019-11-21 ENCOUNTER — HOSPITAL ENCOUNTER (OUTPATIENT)
Dept: ULTRASOUND IMAGING | Facility: HOSPITAL | Age: 34
Discharge: HOME | End: 2019-11-21
Payer: COMMERCIAL

## 2019-11-21 DIAGNOSIS — Z32.01 POSITIVE PREGNANCY TEST: ICD-10-CM

## 2019-11-21 PROCEDURE — 76801 OB US < 14 WKS SINGLE FETUS: CPT

## 2019-11-26 ENCOUNTER — APPOINTMENT (OUTPATIENT)
Dept: LAB | Facility: MEDICAL CENTER | Age: 34
End: 2019-11-26
Payer: COMMERCIAL

## 2019-11-26 ENCOUNTER — INITIAL PRENATAL (OUTPATIENT)
Dept: OBGYN CLINIC | Facility: MEDICAL CENTER | Age: 34
End: 2019-11-26

## 2019-11-26 DIAGNOSIS — Z34.91 ENCOUNTER FOR PREGNANCY RELATED EXAMINATION IN FIRST TRIMESTER: ICD-10-CM

## 2019-11-26 DIAGNOSIS — Z34.91 ENCOUNTER FOR PREGNANCY RELATED EXAMINATION IN FIRST TRIMESTER: Primary | ICD-10-CM

## 2019-11-26 DIAGNOSIS — Z13.71 SCREENING FOR GENETIC DISEASE CARRIER STATUS: ICD-10-CM

## 2019-11-26 LAB
ABO GROUP BLD: NORMAL
BACTERIA UR QL AUTO: NORMAL /HPF
BASOPHILS # BLD AUTO: 0.01 THOUSANDS/ΜL (ref 0–0.1)
BASOPHILS NFR BLD AUTO: 0 % (ref 0–1)
BILIRUB UR QL STRIP: NEGATIVE
BLD GP AB SCN SERPL QL: NEGATIVE
CLARITY UR: CLEAR
COLOR UR: YELLOW
EOSINOPHIL # BLD AUTO: 0.03 THOUSAND/ΜL (ref 0–0.61)
EOSINOPHIL NFR BLD AUTO: 1 % (ref 0–6)
ERYTHROCYTE [DISTWIDTH] IN BLOOD BY AUTOMATED COUNT: 18.1 % (ref 11.6–15.1)
GLUCOSE UR STRIP-MCNC: ABNORMAL MG/DL
HBV SURFACE AG SER QL: NORMAL
HCT VFR BLD AUTO: 34.4 % (ref 34.8–46.1)
HGB BLD-MCNC: 10.8 G/DL (ref 11.5–15.4)
HGB UR QL STRIP.AUTO: NEGATIVE
IMM GRANULOCYTES # BLD AUTO: 0.02 THOUSAND/UL (ref 0–0.2)
IMM GRANULOCYTES NFR BLD AUTO: 0 % (ref 0–2)
KETONES UR STRIP-MCNC: NEGATIVE MG/DL
LEUKOCYTE ESTERASE UR QL STRIP: ABNORMAL
LYMPHOCYTES # BLD AUTO: 2.45 THOUSANDS/ΜL (ref 0.6–4.47)
LYMPHOCYTES NFR BLD AUTO: 38 % (ref 14–44)
MCH RBC QN AUTO: 26.7 PG (ref 26.8–34.3)
MCHC RBC AUTO-ENTMCNC: 31.4 G/DL (ref 31.4–37.4)
MCV RBC AUTO: 85 FL (ref 82–98)
MONOCYTES # BLD AUTO: 0.41 THOUSAND/ΜL (ref 0.17–1.22)
MONOCYTES NFR BLD AUTO: 6 % (ref 4–12)
NEUTROPHILS # BLD AUTO: 3.61 THOUSANDS/ΜL (ref 1.85–7.62)
NEUTS SEG NFR BLD AUTO: 55 % (ref 43–75)
NITRITE UR QL STRIP: NEGATIVE
NON-SQ EPI CELLS URNS QL MICRO: NORMAL /HPF
NRBC BLD AUTO-RTO: 0 /100 WBCS
PH UR STRIP.AUTO: 7 [PH]
PLATELET # BLD AUTO: 335 THOUSANDS/UL (ref 149–390)
PMV BLD AUTO: 9.8 FL (ref 8.9–12.7)
PROT UR STRIP-MCNC: NEGATIVE MG/DL
RBC # BLD AUTO: 4.04 MILLION/UL (ref 3.81–5.12)
RBC #/AREA URNS AUTO: NORMAL /HPF
RH BLD: NEGATIVE
RUBV IGG SERPL IA-ACNC: 151.4 IU/ML
SP GR UR STRIP.AUTO: 1.02 (ref 1–1.03)
SPECIMEN EXPIRATION DATE: NORMAL
UROBILINOGEN UR QL STRIP.AUTO: 0.2 E.U./DL
WBC # BLD AUTO: 6.53 THOUSAND/UL (ref 4.31–10.16)
WBC #/AREA URNS AUTO: NORMAL /HPF

## 2019-11-26 PROCEDURE — 87086 URINE CULTURE/COLONY COUNT: CPT

## 2019-11-26 PROCEDURE — 36415 COLL VENOUS BLD VENIPUNCTURE: CPT

## 2019-11-26 PROCEDURE — 80081 OBSTETRIC PANEL INC HIV TSTG: CPT

## 2019-11-26 PROCEDURE — 81401 MOPATH PROCEDURE LEVEL 2: CPT

## 2019-11-26 PROCEDURE — 81001 URINALYSIS AUTO W/SCOPE: CPT

## 2019-11-26 PROCEDURE — OBC

## 2019-11-26 NOTE — PATIENT INSTRUCTIONS
Pregnancy at 6 to [address]:   What changes are happening to your body:  Pregnancy hormones may cause your body to go through many changes during this stage of your pregnancy  You may feel more tired than usual, and have mood swings, nausea and vomiting, and headaches  Your breasts may feel tender and swollen and you may urinate more frequently  Seek care immediately if:   · You have pain or cramping in your abdomen or low back  · You have heavy vaginal bleeding or clotting  · You pass material that looks like tissue or large clots  Collect the material and bring it with you  Contact your healthcare provider if:   · You have light bleeding  · You have chills or a fever  · You have vaginal itching, burning, or pain  · You have yellow, green, white, or foul-smelling vaginal discharge  · You have pain or burning when you urinate, less urine than usual, or pink or bloody urine  · You have questions or concerns about your condition or care  How to care for yourself at this stage of your pregnancy:   · Manage nausea and vomiting  Avoid fatty and spicy foods  Eat small meals throughout the day instead of large meals  Sherri may help to decrease nausea  Ask your healthcare provider about other ways of decreasing nausea and vomiting  · Eat a variety of healthy foods  Healthy foods include fruits, vegetables, whole-grain breads, low-fat dairy foods, beans, lean meats, and fish  Drink liquids as directed  Ask how much liquid to drink each day and which liquids are best for you  Limit caffeine to less than 200 milligrams each day  Limit your intake of fish to 2 servings each week  Choose fish low in mercury such as canned light tuna, shrimp, salmon, cod, or tilapia  Do not  eat fish high in mercury such as swordfish, tilefish, javon mackerel, and shark  · Take prenatal vitamins as directed    Your need for certain vitamins and minerals, such as folic acid, increases during pregnancy  Prenatal vitamins provide some of the extra vitamins and minerals you need  Prenatal vitamins may also help to decrease the risk of certain birth defects  · Ask how much weight you should gain each month  Too much or too little weight gain can be unhealthy for you and your baby  · Do not smoke  If you smoke, it is never too late to quit  Smoking increases your risk of a miscarriage and other health problems during your pregnancy  Smoking can cause your baby to be born too early or weigh less at birth  Ask your healthcare provider for information if you need help quitting  · Do not drink alcohol  Alcohol passes from your body to your baby through the placenta  It can affect your baby's brain development and cause fetal alcohol syndrome (FAS)  FAS is a group of conditions that causes mental, behavior, and growth problems  · Talk to your healthcare provider before you take any medicines  Many medicines may harm your baby if you take them when you are pregnant  Do not take any medicines, vitamins, herbs, or supplements without first talking to your healthcare provider  Never use illegal or street drugs (such as marijuana or cocaine) while you are pregnant  Safety tips during pregnancy:   · Avoid hot tubs and saunas  Do not use a hot tub or sauna while you are pregnant, especially during your first trimester  Hot tubs and saunas may raise your baby's temperature and increase the risk of birth defects  · Avoid toxoplasmosis  This is an infection caused by eating raw meat or being around infected cat feces  It can cause birth defects, miscarriages, and other problems  Wash your hands after you touch raw meat  Make sure any meat is well-cooked before you eat it  Avoid raw eggs and unpasteurized milk  Use gloves or ask someone else to clean your cat's litter box while you are pregnant    Changes that are happening with your baby:  By 10 weeks, your baby will be about 2 ½ inches long from the top of the head to the rump (baby's bottom)  Your baby weighs about ½ ounce  Major body organs, such as the brain, heart, and lungs, are forming  Your baby's facial features are also starting to form  What you need to know about prenatal care:  Prenatal care is a series of visits with your healthcare provider throughout your pregnancy  During the first 28 weeks of your pregnancy, you will see your healthcare provider once a month  Prenatal care can help prevent problems during pregnancy and childbirth  Your healthcare provider will ask questions about your health and any previous pregnancies you have had  He will also ask about any medicines you are taking  You may also need any of the following:  · A pap smear  will be done to check your cervix for abnormal cells  The cervix is the narrow opening at the bottom of your uterus  The cervix meets the top part of the vagina  · A pelvic exam  allows your healthcare provider to see your cervix (the bottom part of your uterus)  Your healthcare provider uses a speculum to gently open your vagina  He will check the size and shape of your uterus  · Blood tests  may be done to check for anemia or blood type  Your healthcare provider may also order other blood tests to check if you are immune to certain diseases such as Hepatitis B  He may also recommend an HIV test     · Urine tests  may also be done to check for signs of infection  · Your blood pressure and weight  will be checked  © 2017 [facility] Information is for End User's use only and may not be sold, redistributed or otherwise used for commercial purposes  All illustrations and images included in CareNotes® are the copyrighted property of [employer] M [employer]  or [employer]  The above information is an  only  It is not intended as medical advice for individual conditions or treatments   Talk to your doctor, nurse or pharmacist before following any medical regimen to see if it is safe and effective for you  Pregnancy at 11 to 14 Weeks   AMBULATORY CARE:   What changes are happening to your body: You are now at the end of your first trimester and entering your second trimester  Morning sickness usually goes away by this time  You may have other symptoms such as fatigue, frequent urination, and headaches  You may have gained between 2 to 4 pounds by now  Seek care immediately if:   · You have pain or cramping in your abdomen or low back  · You have heavy vaginal bleeding or clotting  · You pass material that looks like tissue or large clots  Collect the material and bring it with you  Contact your healthcare provider if:   · You cannot keep food or drinks down, and you are losing weight  · You have light bleeding  · You have chills or a fever  · You have vaginal itching, burning, or pain  · You have yellow, green, white, or foul-smelling vaginal discharge  · You have pain or burning when you urinate, less urine than usual, or pink or bloody urine  · You have questions or concerns about your condition or care  How to care for yourself at this stage of your pregnancy:   · Get plenty of rest   You may feel more tired than normal  You may need to take naps or go to bed earlier  · Manage nausea and vomiting  Avoid fatty and spicy foods  Eat small meals throughout the day instead of large meals  Sherri may help to decrease nausea  Ask your healthcare provider about other ways of decreasing nausea and vomiting  · Eat a variety of healthy foods  Healthy foods include fruits, vegetables, whole-grain breads, low-fat dairy foods, beans, lean meats, and fish  Drink liquids as directed  Ask how much liquid to drink each day and which liquids are best for you  Limit caffeine to less than 200 milligrams each day  Limit your intake of fish to 2 servings each week  Choose fish low in mercury such as canned light tuna, shrimp, salmon, cod, or tilapia   Do not  eat fish high in mercury such as swordfish, tilefish, javon mackerel, and shark  · Take prenatal vitamins as directed  Your need for certain vitamins and minerals, such as folic acid, increases during pregnancy  Prenatal vitamins provide some of the extra vitamins and minerals you need  Prenatal vitamins may also help to decrease the risk of certain birth defects  · Do not smoke  If you smoke, it is never too late to quit  Smoking increases your risk of a miscarriage and other health problems during your pregnancy  Smoking can cause your baby to be born too early or weigh less at birth  Ask your healthcare provider for information if you need help quitting  · Do not drink alcohol  Alcohol passes from your body to your baby through the placenta  It can affect your baby's brain development and cause fetal alcohol syndrome (FAS)  FAS is a group of conditions that causes mental, behavior, and growth problems  · Talk to your healthcare provider before you take any medicines  Many medicines may harm your baby if you take them when you are pregnant  Do not take any medicines, vitamins, herbs, or supplements without first talking to your healthcare provider  Never use illegal or street drugs (such as marijuana or cocaine) while you are pregnant  Safety tips during pregnancy:   · Avoid hot tubs and saunas  Do not use a hot tub or sauna while you are pregnant, especially during your first trimester  Hot tubs and saunas may raise your baby's temperature and increase the risk of birth defects  · Avoid toxoplasmosis  This is an infection caused by eating raw meat or being around infected cat feces  It can cause birth defects, miscarriages, and other problems  Wash your hands after you touch raw meat  Make sure any meat is well-cooked before you eat it  Avoid raw eggs and unpasteurized milk  Use gloves or ask someone else to clean your cat's litter box while you are pregnant    Changes that are happening with your baby: Your baby has fully formed fingernails and toenails  Your baby's heartbeat can now be heard  Ask your healthcare provider if you can listen to your baby's heartbeat  By week 14, your baby is over 4 inches long from the top of the head to the rump (baby's bottom)  Your baby weighs over 3 ounces  What you need to know about prenatal care:  During the first 28 weeks of your pregnancy, you will see your healthcare provider once a month  Prenatal care can help prevent problems during pregnancy and childbirth  Your healthcare provider will check your blood pressure and weight  You may also need any of the following:  · A urine test  may also be done to check for sugar and protein  These can be signs of gestational diabetes or infection  · Genetic disorders screening tests  may be offered to you  This screening test checks your baby's risk of genetic disorders such as Down syndrome  The screening test includes a blood test and ultrasound  · Your baby's heart rate  will be checked  © 2017 [address]  Information is for End User's use only and may not be sold, redistributed or otherwise used for commercial purposes  All illustrations and images included in CareNotes® are the copyrighted property of A D A M , Inc  or [employer]  The above information is an  only  It is not intended as medical advice for individual conditions or treatments  Talk to your doctor, nurse or pharmacist before following any medical regimen to see if it is safe and effective for you

## 2019-11-26 NOTE — PROGRESS NOTES
OB INTAKE INTERVIEW      Pt presents for OB intake    OB History    Para Term  AB Living   2 1 1     1   SAB TAB Ectopic Multiple Live Births           1      # Outcome Date GA Lbr Marcelo/2nd Weight Sex Delivery Anes PTL Lv   2 Current            1 Term 04/20/15 37w0d  2466 g (5 lb 7 oz) F CS-LTranv  N BEN         Hx of  delivery prior to 36 weeks 6 days: NO     Last Menstrual Period:   Patient's last menstrual period was 2019 (within days)  Ultrasound date:   2019 8 weeks 5 days  Estimated date of delivery:   Estimated Date of Delivery: 20  confirmed by  US  ? History of Diabetes: NO  History of Hypertension: NO      Infection Screening: Does the pt have a hx of MRSA? NO    H&P visit scheduled  19  ? Interview education  Information on [facility] Pregnancy Essentials reviewed  Handouts given: Baby and Me phone lucía guide  Baby and Me support center  [facility] in Pregnancy information sheet     [facility]  Discussed genetic testing-    - pt interested in NIPT/SEQ  Screen  Referral to genetic counselor given  CF carrier screening completed qith previous pregnancy-result negative  SMA carrier screening drawn with prenatal labs today  Discussed Tdap  Vaccine  States she had influenza vaccine at [employer] 10/2019  Depression Screening Follow-up Plan: Patient's depression screening was NEGATIVE  with an [place] score of  1              The patient was oriented to our practice and all questions were answered    Interviewed by: Joseph Chandler, RN 19

## 2019-11-27 LAB — RPR SER QL: NORMAL

## 2019-11-28 LAB
BACTERIA UR CULT: NORMAL
HIV 1+2 AB+HIV1 P24 AG SERPL QL IA: NORMAL

## 2019-11-29 ENCOUNTER — CLINICAL SUPPORT (OUTPATIENT)
Dept: OBGYN CLINIC | Facility: MEDICAL CENTER | Age: 34
End: 2019-11-29

## 2019-11-29 DIAGNOSIS — Z34.91 ENCOUNTER FOR PREGNANCY RELATED EXAMINATION IN FIRST TRIMESTER: Primary | ICD-10-CM

## 2019-11-29 LAB
BACTERIA UR QL AUTO: ABNORMAL /HPF
BILIRUB UR QL STRIP: NEGATIVE
CAOX CRY URNS QL MICRO: ABNORMAL /HPF
CLARITY UR: ABNORMAL
COLOR UR: YELLOW
GLUCOSE UR STRIP-MCNC: ABNORMAL MG/DL
HGB UR QL STRIP.AUTO: NEGATIVE
KETONES UR STRIP-MCNC: NEGATIVE MG/DL
LEUKOCYTE ESTERASE UR QL STRIP: NEGATIVE
NITRITE UR QL STRIP: NEGATIVE
NON-SQ EPI CELLS URNS QL MICRO: ABNORMAL /HPF
PH UR STRIP.AUTO: 7 [PH]
PROT UR STRIP-MCNC: NEGATIVE MG/DL
RBC #/AREA URNS AUTO: ABNORMAL /HPF
SP GR UR STRIP.AUTO: 1.03 (ref 1–1.03)
UROBILINOGEN UR QL STRIP.AUTO: 0.2 E.U./DL
WBC #/AREA URNS AUTO: ABNORMAL /HPF

## 2019-11-29 PROCEDURE — 81001 URINALYSIS AUTO W/SCOPE: CPT | Performed by: OBSTETRICS & GYNECOLOGY

## 2019-11-29 PROCEDURE — 87086 URINE CULTURE/COLONY COUNT: CPT | Performed by: OBSTETRICS & GYNECOLOGY

## 2019-11-30 LAB — BACTERIA UR CULT: NORMAL

## 2019-12-05 LAB
ANNOTATION COMMENT IMP: NORMAL
ETHNIC BACKGROUND STATED: NORMAL
REF LAB TEST METHOD: NORMAL
SL AMB CARRIER DETECTION RATE: NORMAL
SL AMB CLIENT SPECIMEN ID: NORMAL
SL AMB CLINICAL DATA: NORMAL
SL AMB DISCLAIMER: NORMAL
SL AMB ELECTONICALLY SIGNED BY: NORMAL
SL AMB GENETIC COUNSELOR: NORMAL
SL AMB REFERENCES: NORMAL
SL AMB SPECIMEN(S) RECEIVED: NORMAL
SMN1 GENE MUT ANL BLD/T: NORMAL
SMN1 GENE MUT ANL BLD/T: NORMAL
SPECIMEN SOURCE: NORMAL

## 2019-12-06 ENCOUNTER — APPOINTMENT (OUTPATIENT)
Dept: LAB | Facility: HOSPITAL | Age: 34
End: 2019-12-06
Attending: OBSTETRICS & GYNECOLOGY
Payer: COMMERCIAL

## 2019-12-06 DIAGNOSIS — R81 GLUCOSE FOUND IN URINE ON EXAMINATION: Primary | ICD-10-CM

## 2019-12-06 DIAGNOSIS — R81 GLUCOSE FOUND IN URINE ON EXAMINATION: ICD-10-CM

## 2019-12-06 LAB
EST. AVERAGE GLUCOSE BLD GHB EST-MCNC: 103 MG/DL
GLUCOSE 1H P 50 G GLC PO SERPL-MCNC: 182 MG/DL
HBA1C MFR BLD: 5.2 % (ref 4.2–6.3)

## 2019-12-06 PROCEDURE — 83036 HEMOGLOBIN GLYCOSYLATED A1C: CPT

## 2019-12-06 PROCEDURE — 82950 GLUCOSE TEST: CPT

## 2019-12-06 PROCEDURE — 36415 COLL VENOUS BLD VENIPUNCTURE: CPT

## 2019-12-10 ENCOUNTER — TELEPHONE (OUTPATIENT)
Dept: OBGYN CLINIC | Facility: MEDICAL CENTER | Age: 34
End: 2019-12-10

## 2019-12-10 DIAGNOSIS — R73.09 ELEVATED GLUCOSE TOLERANCE TEST: Primary | ICD-10-CM

## 2019-12-10 NOTE — TELEPHONE ENCOUNTER
allto patient to review 1hour GTT result  As per Dr Belkis Cooney, patient was given option of referral to diabetes in pregnancy program or complete 3 hour GTT  Elects to complete 3hour GTT  Instructions for completion reviewed    States she will complete later this week

## 2019-12-12 ENCOUNTER — TELEPHONE (OUTPATIENT)
Dept: OBGYN CLINIC | Facility: MEDICAL CENTER | Age: 34
End: 2019-12-12

## 2019-12-12 ENCOUNTER — APPOINTMENT (OUTPATIENT)
Dept: LAB | Facility: HOSPITAL | Age: 34
End: 2019-12-12
Attending: OBSTETRICS & GYNECOLOGY
Payer: COMMERCIAL

## 2019-12-12 DIAGNOSIS — O24.410 DIET CONTROLLED GESTATIONAL DIABETES MELLITUS (GDM) IN FIRST TRIMESTER: Primary | ICD-10-CM

## 2019-12-12 DIAGNOSIS — R73.09 ELEVATED GLUCOSE TOLERANCE TEST: ICD-10-CM

## 2019-12-12 LAB — GLUCOSE P FAST SERPL-MCNC: 97 MG/DL (ref 65–99)

## 2019-12-12 PROCEDURE — 36415 COLL VENOUS BLD VENIPUNCTURE: CPT

## 2019-12-12 PROCEDURE — 82951 GLUCOSE TOLERANCE TEST (GTT): CPT

## 2019-12-12 NOTE — TELEPHONE ENCOUNTER
Patient returned your call  Patient needs to know what to do next  You may leave a message on her voice mail

## 2019-12-12 NOTE — TELEPHONE ENCOUNTER
Notified of gestational diabetes diagnosis and need for referral to diabetes in pregnancy program   Orders for same placed

## 2019-12-16 ENCOUNTER — APPOINTMENT (OUTPATIENT)
Dept: LAB | Facility: MEDICAL CENTER | Age: 34
End: 2019-12-16
Payer: COMMERCIAL

## 2019-12-16 ENCOUNTER — OFFICE VISIT (OUTPATIENT)
Dept: RHEUMATOLOGY | Facility: CLINIC | Age: 34
End: 2019-12-16
Payer: COMMERCIAL

## 2019-12-16 VITALS
BODY MASS INDEX: 29.59 KG/M2 | DIASTOLIC BLOOD PRESSURE: 82 MMHG | HEIGHT: 63 IN | WEIGHT: 167 LBS | SYSTOLIC BLOOD PRESSURE: 112 MMHG | RESPIRATION RATE: 18 BRPM

## 2019-12-16 DIAGNOSIS — R76.8 RHEUMATOID FACTOR POSITIVE: ICD-10-CM

## 2019-12-16 DIAGNOSIS — M25.50 ARTHRALGIA, UNSPECIFIED JOINT: Primary | ICD-10-CM

## 2019-12-16 LAB
25(OH)D3 SERPL-MCNC: 21.4 NG/ML (ref 30–100)
CRP SERPL QL: 9.1 MG/L
ERYTHROCYTE [SEDIMENTATION RATE] IN BLOOD: 42 MM/HOUR (ref 0–20)

## 2019-12-16 PROCEDURE — 82306 VITAMIN D 25 HYDROXY: CPT | Performed by: INTERNAL MEDICINE

## 2019-12-16 PROCEDURE — 36415 COLL VENOUS BLD VENIPUNCTURE: CPT | Performed by: INTERNAL MEDICINE

## 2019-12-16 PROCEDURE — 86200 CCP ANTIBODY: CPT | Performed by: INTERNAL MEDICINE

## 2019-12-16 PROCEDURE — 86140 C-REACTIVE PROTEIN: CPT | Performed by: INTERNAL MEDICINE

## 2019-12-16 PROCEDURE — 85652 RBC SED RATE AUTOMATED: CPT | Performed by: INTERNAL MEDICINE

## 2019-12-16 PROCEDURE — 99244 OFF/OP CNSLTJ NEW/EST MOD 40: CPT | Performed by: INTERNAL MEDICINE

## 2019-12-16 NOTE — PATIENT INSTRUCTIONS
Do bloodwork today  Use diclofenac gel as needed  Call if you have any flares    Return to clinic in 3 months    Rheumatoid Arthritis   AMBULATORY CARE:   Rheumatoid arthritis  is a long-term autoimmune disease that causes inflammation and damage to your joints  RA causes your body's immune system to attack the synovial membrane (lining) in your joints  RA can also affect other organs, such as your eyes, heart, or lungs  It may also increase your risk of osteoporosis (weakened bones)  Common symptoms include the following:   · Joint pain and stiffness that lasts longer than 1 hour    · Swollen joints in the same joint on both sides of your body    · Loss of joint movement     · Firm, round nodules (growths) on your joints    · Fatigue or muscle weakness    · Loss of appetite or weight loss  Seek care immediately if:   · You have increased joint swelling, pain, or redness  · You have sudden shortness of breath  · You lose feeling in your hands or feet  Contact your healthcare provider or rheumatologist if:   · You have a fever  · Your skin is itchy, swollen, or has a rash  · Your symptoms are getting worse, even with treatment  · You have questions or concerns about your condition or care  Treatment for rheumatoid arthritis  may include any of the following:  · Antirheumatics  help slow the progress of RA, and reduce pain, stiffness, and inflammation  · NSAIDs , such as ibuprofen, help decrease swelling, pain, and fever  This medicine is available with or without a doctor's order  NSAIDs can cause stomach bleeding or kidney problems in certain people  If you take blood thinner medicine, always ask your healthcare provider if NSAIDs are safe for you  Always read the medicine label and follow directions  · Steroids  help decrease inflammation  · Biologic therapy  helps decrease joint swelling, pain, and stiffness   These medicines increase the risk of serious infection and require careful monitoring  · Take your medicine as directed  Contact your healthcare provider if you think your medicine is not helping or if you have side effects  Tell him of her if you are allergic to any medicine  Keep a list of the medicines, vitamins, and herbs you take  Include the amounts, and when and why you take them  Bring the list or the pill bottles to follow-up visits  Carry your medicine list with you in case of an emergency  · Surgery  may be needed to remove all or part of your joint  An implant may be placed to help reduce pain and repair the joint  Manage your symptoms:   · Go to physical and occupational therapy as directed  A physical therapist can teach you exercises to help improve movement and strength, and to decrease pain  An occupational therapist can teach you skills to help with your daily activities  · Use support devices  You may be given splints or braces to help your joints rest and to decrease inflammation  · Rest when your joints are painful  Limit your activities until your symptoms improve  Gradually start your normal activities when you can do them without pain  Avoid motions and activities that cause strain on your joints, such as heavy exercise and lifting  · Apply ice or heat  Both can help decrease swelling and pain  Use an ice pack, or put crushed ice in a plastic bag  Cover it with a towel and place it on your joint for 15 to 20 minutes every hour or as directed  You can apply heat for 20 minutes every 2 hours  Heat treatment includes hot packs or a warm compress  · Maintain a healthy weight  to decrease the strain on joints in your back, knees, ankles, and feet  Ask your healthcare provider how much you should weigh  Ask him to help you create a weight loss plan if you are overweight  · Physical activity  can help increase strength and flexibility  Be as active as possible while avoiding things that increase your pain   Ask your healthcare provider or rheumatologist about the best exercise plan for you  Ask your healthcare provider about vaccines: You may be at an increased risk for infections due to RA and its treatment  Vaccines may prevent infections from various viruses  Follow up with your healthcare provider as directed:  Write down your questions so you remember to ask them during your visits  © 2017 [address] Information is for End User's use only and may not be sold, redistributed or otherwise used for commercial purposes  All illustrations and images included in CareNotes® are the copyrighted property of [employer]  or [employer]  The above information is an  only  It is not intended as medical advice for individual conditions or treatments  Talk to your doctor, nurse or pharmacist before following any medical regimen to see if it is safe and effective for you

## 2019-12-16 NOTE — PROGRESS NOTES
Assessment and Plan: Frankie Ricks is a 27 y o  female who presents as a Rheumatology consult referred by her PCP Navjot Bowser PA-C for evaluation of possible Rheumatoid arthritis  Patient has a highly elevated RF of 160 but negative anti-CCP, and her symptoms are atypical for Rheumatoid arthritis (denies morning stiffness, does not have symmetric joint involvement, etc )  She mainly complains of episodes of generalized body pain  However, she does have elevated inflammatory markers, which may be contributed to by the fact that she is pregnant  Since she is currently asymptomatic and is pregnant, have only prescribed diclofenac gel to apply to her painful joints as needed  She will be asked to increase her daily Vit  D intake due to having a slightly low Vit  D level  She was asked to call the clinic if she has any more body or joint pain episodes, so that she can be treated accordingly  If patient's symptoms become more frequent and/or bothersome, can consider starting her on hydroxychloroquine, which is safe in pregnancy, for management of atypical Rheumatoid arthritis  Plan:  Diagnoses and all orders for this visit:    Arthralgia, unspecified joint  -     Cyclic citrul peptide antibody, IgG  -     Sedimentation rate, automated  -     C-reactive protein  -     Vitamin D 25 hydroxy  -     diclofenac sodium (VOLTAREN) 1 %; Apply 4 g topically 4 (four) times a day as needed (pain)    Rheumatoid factor positive  -     Cyclic citrul peptide antibody, IgG  -     Sedimentation rate, automated  -     C-reactive protein  -     Vitamin D 25 hydroxy    Follow-up plan: Return to clinic in 3 months      HPI  Frankie Ricks is a 27 y o   female who presents as a Rheumatology consult referred by her Navjot Bowser PA-C for evaluation of possible Rheumatoid arthritis  Patient had a highly elevated RF of 160 in 10/2019, and has been having body pain episodes   She states that during these episodes, she gets general body pain, fevers, and shaky  She states that her first body pain episode started in 2019, the had another episode in 2019  She tends to flare with stress  She started having pain in her right leg last month after sitting for several hours, which lasted for 3 days; she took ibuprofen for it  She sometimes has pain in her wrists, especially left wrist pain in 2019  She denies any morning stiffness, and currently has no pain  She is currently pregnant, is 12 week gestation; has a history of fibroids  Review of Systems  Review of Systems   Constitutional: Positive for fatigue  Negative for chills, fever and unexpected weight change  HENT: Negative for mouth sores and trouble swallowing  Eyes: Negative for pain and visual disturbance  Respiratory: Negative for cough and shortness of breath  Cardiovascular: Negative for chest pain and leg swelling  Gastrointestinal: Negative for abdominal pain, blood in stool, constipation, diarrhea and nausea  Genitourinary: Negative for hematuria  Musculoskeletal: Positive for back pain  Negative for arthralgias, joint swelling and myalgias  Skin: Negative for rash  Neurological: Negative for weakness and numbness  Hematological: Negative for adenopathy  Psychiatric/Behavioral: Negative for sleep disturbance  Allergies  No Known Allergies    Home Medications    Current Outpatient Medications:     folic acid (FOLVITE) 1 mg tablet, Take 1 tablet (1 mg total) by mouth daily, Disp: 90 tablet, Rfl: 3    Prenatal Vit-Fe Fumarate-FA (PRENATAL 1 PLUS 1 PO), Take 1 tablet by mouth daily, Disp: , Rfl:     diclofenac sodium (VOLTAREN) 1 %, Apply 4 g topically 4 (four) times a day as needed (pain), Disp: 100 g, Rfl: 3    Past Medical History  Past Medical History:   Diagnosis Date    Anemia     iron def   anemia    Fibroid     Varicella        Past Surgical History   Past Surgical History:   Procedure Laterality Date     SECTION  MYOMECTOMY         Family History    Family History   Problem Relation Age of Onset    Hypertension Mother     Hypertension Father     No Known Problems Sister     Dementia Maternal Grandmother     Diabetes Paternal Grandfather     No Known Problems Brother     No Known Problems Brother     No Known Problems Sister     Autism Daughter    mother - knee arthritis, possible RA  Maternal grandmother - possible RA    Social History  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; goes to the school of nursing as well  Social History     Substance and Sexual Activity   Alcohol Use Not Currently     Social History     Substance and Sexual Activity   Drug Use Never     Social History     Tobacco Use   Smoking Status Never Smoker   Smokeless Tobacco Never Used       Objective:  Vitals:    12/16/19 1308   BP: 112/82   Resp: 18   Weight: 75 8 kg (167 lb)   Height: 5' 3" (1 6 m)       Physical Exam   Constitutional: She appears well-developed and well-nourished  No distress  HENT:   Head: Normocephalic and atraumatic  Mouth/Throat: Oropharynx is clear and moist and mucous membranes are normal    Eyes: Conjunctivae and EOM are normal  No scleral icterus  Neck: Neck supple  No spinous process tenderness and no muscular tenderness present  No thyromegaly present  Cardiovascular: Regular rhythm, S1 normal and S2 normal  Exam reveals no friction rub  No murmur heard  tachycardic   Pulmonary/Chest: Effort normal and breath sounds normal  No respiratory distress  She has no wheezes  She has no rhonchi  She has no rales  Abdominal: Soft  She exhibits no distension  There is no hepatosplenomegaly  There is no tenderness  Musculoskeletal: She exhibits tenderness  Right ankle tenderness   Lymphadenopathy:     She has no cervical adenopathy  Neurological: She is alert  She has normal strength  No sensory deficit  Skin: Skin is warm and dry  No rash noted   Nails show no clubbing  Psychiatric: She has a normal mood and affect  Reviewed labs and imaging  Imaging:   Procedure: Us Ob < 14 Weeks With Transvaginal    Result Date: 11/21/2019  Narrative: FIRST TRIMESTER OBSTETRIC ULTRASOUND, COMPLETE INDICATION:  LMP is 9/13/2019  Z32 01: Encounter for pregnancy test, result positive       COMPARISON: None  TECHNIQUE:   Transabdominal ultrasound of the pelvis was performed  Additional transvaginal imaging was then performed to better assess the gestation, myometrial/endometrial architecture and ovarian parenchymal detail  The study includes volumetric sweeps and traditional still imaging technique  FINDINGS: A single live intrauterine gestation is identified  Cardiac activity is present  Heart rate of 169 bpm  YOLK SAC:  Present and normal in size and appearance  MEAN GESTATIONAL SAC SIZE: 35 mm = 9 weeks 0 days MEAN CROWN RUMP LENGTH:  20 mm = 8 weeks 5 days FETAL ANATOMY:  Appropriate for gestational age  AMNIOTIC FLUID/SAC SHAPE:  Within expected normal range  PLACENTA:  The placenta is appropriate for gestational age  There is no significant subchorionic fluid collection  UTERUS/ADNEXA: Anterior uterine 2 3 x 1 8 x 1 9 cm intramural mass/fibroid  The uterus and right ovary are unremarkable  Left ovary was not seen due to bowel gas artifact  The cervix remains closed  No free fluid present  Impression: Single live intrauterine gestation at 8 weeks 5 days  Workstation performed: [id number]        Labs:   Appointment on 12/12/2019   Component Date Value Ref Range Status    Glucose, GTT - Fasting 12/12/2019 97  65 - 99 mg/dL Final      Specimen collection should occur prior to Sulfasalazine administration due to the potential for falsely depressed results  Specimen collection should occur prior to Sulfapyridine administration due to the potential for falsely elevated results     Appointment on 12/06/2019   Component Date Value Ref Range Status    Glucose 12/06/2019 182* <=134 mg/dL Final      Specimen collection should occur prior to Sulfasalazine administration due to the potential for falsely depressed results  Specimen collection should occur prior to Sulfapyridine administration due to the potential for falsely elevated results  Specimen collection should occur prior to Sulfasalazine administration due to the potential for falsely depressed results  Specimen collection should occur prior to Sulfapyridine administration due to the potential for falsely elevated results      Hemoglobin A1C 12/06/2019 5 2  4 2 - 6 3 % Final    EAG 12/06/2019 103  mg/dl Final   Clinical Support on 11/29/2019   Component Date Value Ref Range Status    Urine Culture 11/29/2019 <10,000 cfu/ml    Final    Mixed Contaminants X3    Clarity, UA 11/29/2019 Turbid   Final    Color, UA 11/29/2019 Yellow   Final    Specific [place], UA 11/29/2019 1 027  1 003 - 1 030 Final    pH, UA 11/29/2019 7 0  4 5, 5 0, 5 5, 6 0, 6 5, 7 0, 7 5, 8 0 Final    Glucose, UA 11/29/2019 250 (1/4%)* Negative mg/dl Final    Ketones, UA 11/29/2019 Negative  Negative mg/dl Final    Blood, UA 11/29/2019 Negative  Negative Final    Protein, UA 11/29/2019 Negative  Negative mg/dl Final    Nitrite, UA 11/29/2019 Negative  Negative Final    Bilirubin, UA 11/29/2019 Negative  Negative Final    Urobilinogen, UA 11/29/2019 0 2  0 2, 1 0 E U /dl E U /dl Final    Leukocytes, UA 11/29/2019 Negative  Negative Final    WBC, UA 11/29/2019 None Seen  None Seen, 0-5, 5-55, 5-65 /hpf Final    RBC, UA 11/29/2019 None Seen  None Seen, 0-5 /hpf Final    Bacteria, UA 11/29/2019 None Seen  None Seen, Occasional /hpf Final    Ca Oxalate [place], UA 11/29/2019 Occasional* None Seen /hpf Final    Epithelial Cells 11/29/2019 Occasional  None Seen, Occasional /hpf Final   Appointment on 11/26/2019   Component Date Value Ref Range Status    Genetic Counselor: [id number] Not applicable   Final    Client Specimen ID: [id number] Not applicable Final    Specimen Type 11/26/2019 Comment   Final    Peripheral Blood    Specimen(s) Received: 11/26/2019 Comment   Final    1 - Lavender 7 ml round bottom tube(s)    Clinical Data 11/26/2019 Comment   Final    Not Provided    Ethnicity 11/26/2019 Comment   Final    Not Provided    SMA Results 11/26/2019 Comment   Final    SMN1 copy number:  2 (Reduced Carrier Risk)    SMA Interpretation 11/26/2019 Note   Final    This individual has an SMN1 copy number of two  This result  reduces but does not eliminate the risk to be a carrier of  SMA  Information regarding clinical indication may provide  a more detailed interpretation   Comments 11/26/2019 Note   Final    Spinal muscular atrophy (SMA) is an autosomal recessive  disease of variable age of onset and severity caused by  mutations (most often deletions or gene conversions) in the  survival motor neuron (SMN1) gene  Molecular testing  assesses the number of copies of the SMN1 gene  Individuals  with one copy of the SMN1 gene are predicted to be carriers  of SMA  Individuals with two or more copies have a reduced  risk to be carriers  (Affected individuals have 0 copies of  the SMN1 gene )    This copy number analysis cannot detect  individuals who are carriers of SMA as a result of either 2  (or very rarely 3) copies of the SMN1 gene on one chromosome  and the absence of the SMN1 gene on the other chromosome or  small intragenic mutations within the SMN1 gene  This  analysis also will not detect germline mosaicism or  mutations in genes other than SMN1  Additionally, de dilma  mutations have been reported in approximately 2% of SMA  patients      Carrier Detection Rate: 11/26/2019 Note   Final    Carrier Frequency and Risk Reductions for Individuals with  No Family History of SMA                                        Reduced   Reduced                                        Carrier   Carrier                               Prior    Risk for  Risk for                    Detection  Carrier  2 copy    3 copy  Ethnicity         rate(1)    Risk(1)  result    result           94 8%      1:47     1:834     1:5,600  Ashkenazi [employer]  90 5%      1:67     1:611     1:5,400               93 3%      1:59     1:806     1:5,600            90 0%      1:68     1:579     1:5,400    70 5%      1:72     1:130     1:4,200   [place] [facility]      90 2%      1:52     1:443     1:5,400    Methods/Limitations 11/26/2019 Note   Final    METHOD/LIMITATIONS:  Specimen DNA is isolated and amplified  by real-time polymerase chain reaction (PCR) for exon 7 of  the SMN1 gene and the internal standard reference genes  A  mathematical algorithm is used to calculate and report SMN1  copy numbers of 0, 1, 2 and 3  Based upon this analysis, an  upper limit of 3 represents the highest degree of accuracy  in reporting SMN1 copy number with statistical confidence  Sequencing of the primer and probe binding sites is  performed on all fetal samples and samples with one copy of  SMN1 by real-time PCR to rule out the presence of sequence  variants which could interfere with analysis and  interpretation  False positive or negative results may  occur for reasons that include genetic variants, blood  transfusions, bone marrow transplantation, erroneous  representation of family relationships or contamination of a  fetal sample with maternal cells   References: 11/26/2019 Note   Final    REFERENCES: 1  Courtney QUIROZ, Sammy N, Cynthia H, et al   Pan-ethnic carrier screening and prenatal diagnosis for  spinal muscular atrophy: clinical laboratory analysis of  >72,400 specimens  Eur J Hum Mary 2012; 20:27-32  2    TW, et al  Technical standards and guidelines for spinal  muscular atrophy testing  Mary Med 2011; 13(7): U0101477      Disclaimer: 11/26/2019 Note   Final    This test was developed and its performance characteristics  determined by [facility]  It has not  been cleared or approved by the Food and Drug  Administration  [employer] [employer] is a business unit of  [employer] [place], a wholly-owned  subsidiary of [employer]   Electronically Signed by: 11/26/2019 Comment   Final    Radha Keller, Ph D , [facility] ,    HIV-1/HIV-2 Ab 11/26/2019 Non-Reactive  Non-Reactive Final    Color, UA 11/26/2019 Yellow   Final    Clarity, UA 11/26/2019 Clear   Final    Specific [place], UA 11/26/2019 1 025  1 003 - 1 030 Final    pH, UA 11/26/2019 7 0  4 5, 5 0, 5 5, 6 0, 6 5, 7 0, 7 5, 8 0 Final    Leukocytes, UA 11/26/2019 Elevated glucose may cause decreased leukocyte values   See urine microscopic for [facility] result/* Negative Final    Nitrite, UA 11/26/2019 Negative  Negative Final    Protein, UA 11/26/2019 Negative  Negative mg/dl Final    Glucose, UA 11/26/2019 >=1000 (1%)* Negative mg/dl Final    Ketones, UA 11/26/2019 Negative  Negative mg/dl Final    Urobilinogen, UA 11/26/2019 0 2  0 2, 1 0 E U /dl E U /dl Final    Bilirubin, UA 11/26/2019 Negative  Negative Final    Blood, UA 11/26/2019 Negative  Negative Final    Urine Culture 11/26/2019 20,000-29,000 cfu/ml    Final    Mixed Contaminants X4    Hepatitis B Surface Ag 11/26/2019 Non-reactive  Non-reactive, NonReactive - Confirmed Final    WBC 11/26/2019 6 53  4 31 - 10 16 Thousand/uL Final    RBC 11/26/2019 4 04  3 81 - 5 12 Million/uL Final    Hemoglobin 11/26/2019 10 8* 11 5 - 15 4 g/dL Final    Hematocrit 11/26/2019 34 4* 34 8 - 46 1 % Final    MCV 11/26/2019 85  82 - 98 fL Final    MCH 11/26/2019 26 7* 26 8 - 34 3 pg Final    MCHC 11/26/2019 31 4  31 4 - 37 4 g/dL Final    RDW 11/26/2019 18 1* 11 6 - 15 1 % Final    MPV 11/26/2019 9 8  8 9 - 12 7 fL Final    Platelets 34/00/4893 335  149 - 390 Thousands/uL Final    nRBC 11/26/2019 0  /100 WBCs Final    Neutrophils Relative 11/26/2019 55  43 - 75 % Final    Immat GRANS % 11/26/2019 0  0 - 2 % Final    Lymphocytes Relative 11/26/2019 38  14 - 44 % Final    Monocytes Relative 11/26/2019 6  4 - 12 % Final    Eosinophils Relative 11/26/2019 1  0 - 6 % Final    Basophils Relative 11/26/2019 0  0 - 1 % Final    Neutrophils Absolute 11/26/2019 3 61  1 85 - 7 62 Thousands/µL Final    Immature Grans Absolute 11/26/2019 0 02  0 00 - 0 20 Thousand/uL Final    Lymphocytes Absolute 11/26/2019 2 45  0 60 - 4 47 Thousands/µL Final    Monocytes Absolute 11/26/2019 0 41  0 17 - 1 22 Thousand/µL Final    Eosinophils Absolute 11/26/2019 0 03  0 00 - 0 61 Thousand/µL Final    Basophils Absolute 11/26/2019 0 01  0 00 - 0 10 Thousands/µL Final    Rubella IgG Quant 11/26/2019 151 4  >9 9 IU/mL Final    ABO Grouping 11/26/2019 O   Final    Rh Factor 11/26/2019 Negative   Final    Antibody Screen 11/26/2019 Negative   Final    Specimen Expiration Date 11/26/2019 [id number]   Final    RPR 11/26/2019 Non-Reactive  Non-Reactive Final    RBC, UA 11/26/2019 None Seen  None Seen, 0-5 /hpf Final    WBC, UA 11/26/2019 None Seen  None Seen, 0-5, 5-55, 5-65 /hpf Final    Epithelial Cells 11/26/2019 None Seen  None Seen, Occasional /hpf Final    Bacteria, UA 11/26/2019 None Seen  None Seen, Occasional /hpf Final   Annual Exam on 11/12/2019   Component Date Value Ref Range Status    URINE HCG 11/12/2019 positive   Final   Admission on 11/09/2019, Discharged on 11/10/2019   Component Date Value Ref Range Status    Sodium 11/09/2019 136  136 - 145 mmol/L Final    Potassium 11/09/2019 3 7  3 5 - 5 3 mmol/L Final    Chloride 11/09/2019 101  100 - 108 mmol/L Final    CO2 11/09/2019 28  21 - 32 mmol/L Final    ANION GAP 11/09/2019 7  4 - 13 mmol/L Final    BUN 11/09/2019 13  5 - 25 mg/dL Final    Creatinine 11/09/2019 0 93  0 60 - 1 30 mg/dL Final    Standardized to IDMS reference method    Glucose 11/09/2019 103  65 - 140 mg/dL Final      If the patient is fasting, the ADA then defines impaired fasting glucose as > 100 mg/dL and diabetes as > or equal to 123 mg/dL  Specimen collection should occur prior to Sulfasalazine administration due to the potential for falsely depressed results  Specimen collection should occur prior to Sulfapyridine administration due to the potential for falsely elevated results   Calcium 11/09/2019 9 3  8 3 - 10 1 mg/dL Final    AST 11/09/2019 17  5 - 45 U/L Final      Specimen collection should occur prior to Sulfasalazine administration due to the potential for falsely depressed results   ALT 11/09/2019 14  12 - 78 U/L Final      Specimen collection should occur prior to Sulfasalazine administration due to the potential for falsely depressed results       Alkaline Phosphatase 11/09/2019 57  46 - 116 U/L Final    Total Protein 11/09/2019 7 8  6 4 - 8 2 g/dL Final    Albumin 11/09/2019 3 4* 3 5 - 5 0 g/dL Final    Total Bilirubin 11/09/2019 0 19* 0 20 - 1 00 mg/dL Final    eGFR 11/09/2019 92  ml/min/1 73sq m Final    WBC 11/09/2019 7 28  4 31 - 10 16 Thousand/uL Final    RBC 11/09/2019 3 83  3 81 - 5 12 Million/uL Final    Hemoglobin 11/09/2019 10 0* 11 5 - 15 4 g/dL Final    Hematocrit 11/09/2019 32 0* 34 8 - 46 1 % Final    MCV 11/09/2019 84  82 - 98 fL Final    MCH 11/09/2019 26 1* 26 8 - 34 3 pg Final    MCHC 11/09/2019 31 3* 31 4 - 37 4 g/dL Final    RDW 11/09/2019 17 2* 11 6 - 15 1 % Final    MPV 11/09/2019 9 2  8 9 - 12 7 fL Final    Platelets 16/86/4236 330  149 - 390 Thousands/uL Final    nRBC 11/09/2019 0  /100 WBCs Final    Neutrophils Relative 11/09/2019 45  43 - 75 % Final    Immat GRANS % 11/09/2019 0  0 - 2 % Final    Lymphocytes Relative 11/09/2019 45* 14 - 44 % Final    Monocytes Relative 11/09/2019 9  4 - 12 % Final    Eosinophils Relative 11/09/2019 1  0 - 6 % Final    Basophils Relative 11/09/2019 0  0 - 1 % Final    Neutrophils Absolute 11/09/2019 3 26  1 85 - 7 62 Thousands/µL Final    Immature Grans Absolute 11/09/2019 0 02 0 00 - 0 20 Thousand/uL Final    Lymphocytes Absolute 11/09/2019 3 27  0 60 - 4 47 Thousands/µL Final    Monocytes Absolute 11/09/2019 0 63  0 17 - 1 22 Thousand/µL Final    Eosinophils Absolute 11/09/2019 0 08  0 00 - 0 61 Thousand/µL Final    Basophils Absolute 11/09/2019 0 02  0 00 - 0 10 Thousands/µL Final    D-Dimer, Quant 11/09/2019 1 04* <0 50 ug/ml FEU Final      Reference and upper limits to exclude DVT and PE are the same  Do not use to exclude if clinical symptoms are present  Pregnant women:  1st trimester:  <0 22 - 1 06 ug/ml FEU  2nd trimester:  <0 22 - 1 88 ug/ml FEU  3rd trimester:   0 24 - 3 28 ug/ml FEU    Note: Normal ranges may not apply to patients who are transgender, non-binary, or whose legal sex, sex at birth, and gender identity differ  Appointment on 10/11/2019   Component Date Value Ref Range Status    CRP 10/11/2019 <3 0  <3 0 mg/L Final    Sed Rate 10/11/2019 28* 0 - 20 mm/hour Final    HIV-1/HIV-2 Ab 10/11/2019 Non-Reactive  Non-Reactive Final    Hepatitis B Surface Ag 10/11/2019 Non-reactive  Non-reactive, NonReactive - Confirmed Final    Hepatitis C Ab 10/11/2019 Non-reactive  Non-reactive Final    Hep B C IgM 10/11/2019 Non-reactive  Non-reactive Final    Hep B Core Total Ab 10/11/2019 Non-reactive  Non-reactive Final    Folate 10/11/2019 17 3  3 1 - 17 5 ng/mL Final    Vitamin B-12 10/11/2019 749  100 - 900 pg/mL Final    Iron 10/11/2019 46* 50 - 170 ug/dL Final      Patients treated with metal-binding drugs (ie  Deferoxamine) may have depressed iron values      Ferritin 10/11/2019 15  8 - 388 ng/mL Final    TIBC 10/11/2019 433  250 - 450 ug/dL Final    Rheumatoid Factor 10/11/2019 Positive* Negative Final    See RF Quantitation    CHAIM 10/11/2019 Negative  Negative Final    RF Quantitation 10/11/2019 160 IU/mL* (none) Final   Appointment on 09/26/2019   Component Date Value Ref Range Status    WBC 09/26/2019 2 70* 4 31 - 10 16 Thousand/uL Final    RBC 09/26/2019 4 15  3 81 - 5 12 Million/uL Final    Hemoglobin 09/26/2019 10 8* 11 5 - 15 4 g/dL Final    Hematocrit 09/26/2019 34 2* 34 8 - 46 1 % Final    MCV 09/26/2019 82  82 - 98 fL Final    MCH 09/26/2019 26 0* 26 8 - 34 3 pg Final    MCHC 09/26/2019 31 6  31 4 - 37 4 g/dL Final    RDW 09/26/2019 19 4* 11 6 - 15 1 % Final    MPV 09/26/2019 10 5  8 9 - 12 7 fL Final    Platelets 09/28/4532 212  149 - 390 Thousands/uL Final    nRBC 09/26/2019 0  /100 WBCs Final    This is an appended report  These results have been appended to a previously preliminary verified report   Sodium 09/26/2019 139  136 - 145 mmol/L Final    Potassium 09/26/2019 3 7  3 5 - 5 3 mmol/L Final    Chloride 09/26/2019 106  100 - 108 mmol/L Final    CO2 09/26/2019 27  21 - 32 mmol/L Final    ANION GAP 09/26/2019 6  4 - 13 mmol/L Final    BUN 09/26/2019 12  5 - 25 mg/dL Final    Creatinine 09/26/2019 0 77  0 60 - 1 30 mg/dL Final    Standardized to IDMS reference method    Glucose, Fasting 09/26/2019 80  65 - 99 mg/dL Final      Specimen collection should occur prior to Sulfasalazine administration due to the potential for falsely depressed results  Specimen collection should occur prior to Sulfapyridine administration due to the potential for falsely elevated results   Calcium 09/26/2019 9 2  8 3 - 10 1 mg/dL Final    AST 09/26/2019 58* 5 - 45 U/L Final      Specimen collection should occur prior to Sulfasalazine administration due to the potential for falsely depressed results   ALT 09/26/2019 40  12 - 78 U/L Final      Specimen collection should occur prior to Sulfasalazine and/or Sulfapyridine administration due to the potential for falsely depressed results       Alkaline Phosphatase 09/26/2019 66  46 - 116 U/L Final    Total Protein 09/26/2019 7 3  6 4 - 8 2 g/dL Final    Albumin 09/26/2019 3 8  3 5 - 5 0 g/dL Final    Total Bilirubin 09/26/2019 0 32  0 20 - 1 00 mg/dL Final    eGFR 09/26/2019 117 ml/min/1 73sq m Final    Cholesterol 09/26/2019 171  50 - 200 mg/dL Final      Cholesterol:       Desirable         <200 mg/dl       Borderline         200-239 mg/dl       High              >239           Triglycerides 09/26/2019 116  <=150 mg/dL Final      Triglyceride:     Normal          <150 mg/dl     Borderline High 150-199 mg/dl     High            200-499 mg/dl        Very High       >499 mg/dl    Specimen collection should occur prior to N-Acetylcysteine or Metamizole administration due to the potential for falsely depressed results   HDL, Direct 09/26/2019 34* 40 - 60 mg/dL Final      HDL Cholesterol:       High    >60 mg/dL       Low     <41 mg/dL  Specimen collection should occur prior to Metamizole administration due to the potential for falsley depressed results   LDL Calculated 09/26/2019 114* 0 - 100 mg/dL Final      LDL Cholesterol:     Optimal           <100 mg/dl     Near Optimal      100-129 mg/dl     Above Optimal       Borderline High 130-159 mg/dl       High            160-189 mg/dl       Very High       >189 mg/dl         This screening LDL is a calculated result  It does not have the accuracy of the Direct Measured LDL in the monitoring of patients with hyperlipidemia and/or statin therapy  Direct Measure LDL (QWT248) must be ordered separately in these patients      Non-HDL-Chol (CHOL-HDL) 09/26/2019 137  mg/dl Final    Color, UA 09/26/2019 Yellow   Final    Clarity, UA 09/26/2019 Clear   Final    Specific [place], UA 09/26/2019 1 021  1 003 - 1 030 Final    pH, UA 09/26/2019 5 5  4 5, 5 0, 5 5, 6 0, 6 5, 7 0, 7 5, 8 0 Final    Leukocytes, UA 09/26/2019 Negative  Negative Final    Nitrite, UA 09/26/2019 Negative  Negative Final    Protein, UA 09/26/2019 Trace* Negative mg/dl Final    Glucose, UA 09/26/2019 Negative  Negative mg/dl Final    Ketones, UA 09/26/2019 Negative  Negative mg/dl Final    Urobilinogen, UA 09/26/2019 0 2  0 2, 1 0 E U /dl E U /dl Final    Bilirubin, UA 09/26/2019 Negative  Negative Final    Blood, UA 09/26/2019 Negative  Negative Final    Lyme IgG/IgM Ab 09/26/2019 <0 91  0 00 - 0 90 ISR Final                                    Negative         <0 91                                  Equivocal  0 91 - 1 09                                  Positive         >1 09    TSH 3RD GENERATON 09/26/2019 1 520  0 358 - 3 740 uIU/mL Final      The recommended reference ranges for TSH during pregnancy are as follows:   First trimester 0 1 to 2 5 uIU/mL   Second trimester  0 2 to 3 0 uIU/mL   Third trimester 0 3 to 3 0 uIU/m    Note: Normal ranges may not apply to patients who are transgender, non-binary, or whose legal sex, sex at birth, and gender identity differ  Using supplements with high doses of biotin 20 to more than 300 times greater than the adequate daily intake for adults of 30 mcg/day as established by the [employer], can cause falsely depress results      RBC, UA 09/26/2019 None Seen  None Seen, 0-5 /hpf Final    WBC,  09/26/2019 None Seen  None Seen, 0-5, 5-55, 5-65 /hpf Final    Epithelial Cells 09/26/2019 None Seen  None Seen, Occasional /hpf Final    Bacteria, UA 09/26/2019 None Seen  None Seen, Occasional /hpf Final    Hyaline Casts, UA 09/26/2019 3-5* None Seen /lpf Final    Segmented % 09/26/2019 48  43 - 75 % Final    Bands % 09/26/2019 7  0 - 8 % Final    Lymphocytes % 09/26/2019 23  14 - 44 % Final    Monocytes % 09/26/2019 7  4 - 12 % Final    Eosinophils, % 09/26/2019 0  0 - 6 % Final    Basophils % 09/26/2019 0  0 - 1 % Final    Metamyelocytes% 09/26/2019 2* 0 - 1 % Final    Atypical Lymphocytes % 09/26/2019 13* <=0 % Final    Absolute Neutrophils 09/26/2019 1 49* 1 85 - 7 62 Thousand/uL Final    Lymphocytes Absolute 09/26/2019 0 62  0 60 - 4 47 Thousand/uL Final    Monocytes Absolute 09/26/2019 0 19  0 00 - 1 22 Thousand/uL Final    Eosinophils Absolute 09/26/2019 0 00  0 00 - 0 40 Thousand/uL Final    Basophils Absolute 09/26/2019 0 00  0 00 - 0 10 Thousand/uL Final    RBC Morphology 09/26/2019 Present   Final    Ovalocytes 09/26/2019 Present   Final    Platelet Estimate 73/82/8219 Adequate  Adequate Final   Admission on 04/14/2019, Discharged on 04/14/2019   Component Date Value Ref Range Status    EXT PREG TEST UR (Ref: Negative) 04/14/2019 Negative   Final    Color, UA 04/14/2019 Yellow   Final    Color, UA 04/14/2019 Yellow   Final    Clarity, UA 04/14/2019 Clear   Final    pH, UA 04/14/2019 6 0  4 5 - 8 0 Final    Leukocytes, UA 04/14/2019 Negative  Negative Final    Nitrite, UA 04/14/2019 Negative  Negative Final    Protein, UA 04/14/2019 Negative  Negative mg/dl Final    Glucose, UA 04/14/2019 Negative  Negative mg/dl Final    Ketones, UA 04/14/2019 Negative  Negative mg/dl Final    Urobilinogen, UA 04/14/2019 0 2  0 2, 1 0 E U /dl E U /dl Final    Bilirubin, UA 04/14/2019 Negative  Negative Final    Blood, UA 04/14/2019 Large* Negative Final    Specific Gravity, UA 04/14/2019 1 010  1 003 - 1 030 Final    RBC, UA 04/14/2019 4-10* None Seen, 0-5 /hpf Final    WBC, UA 04/14/2019 None Seen  None Seen, 0-5, 5-55, 5-65 /hpf Final    Epithelial Cells 04/14/2019 Occasional  None Seen, Occasional /hpf Final    Bacteria, UA 04/14/2019 None Seen  None Seen, Occasional /hpf Final

## 2019-12-17 ENCOUNTER — INITIAL PRENATAL (OUTPATIENT)
Dept: OBGYN CLINIC | Facility: MEDICAL CENTER | Age: 34
End: 2019-12-17

## 2019-12-17 VITALS — DIASTOLIC BLOOD PRESSURE: 60 MMHG | BODY MASS INDEX: 29.76 KG/M2 | WEIGHT: 168 LBS | SYSTOLIC BLOOD PRESSURE: 110 MMHG

## 2019-12-17 DIAGNOSIS — O09.91 SUPERVISION OF HIGH RISK PREGNANCY IN FIRST TRIMESTER: Primary | ICD-10-CM

## 2019-12-17 DIAGNOSIS — Z11.3 SCREENING EXAMINATION FOR STD (SEXUALLY TRANSMITTED DISEASE): ICD-10-CM

## 2019-12-17 DIAGNOSIS — O09.511 AMA (ADVANCED MATERNAL AGE) PRIMIGRAVIDA 35+, FIRST TRIMESTER: ICD-10-CM

## 2019-12-17 DIAGNOSIS — Z34.91 FIRST TRIMESTER PREGNANCY: ICD-10-CM

## 2019-12-17 PROCEDURE — 87491 CHLMYD TRACH DNA AMP PROBE: CPT | Performed by: NURSE PRACTITIONER

## 2019-12-17 PROCEDURE — 87591 N.GONORRHOEAE DNA AMP PROB: CPT | Performed by: NURSE PRACTITIONER

## 2019-12-17 PROCEDURE — PNV: Performed by: NURSE PRACTITIONER

## 2019-12-17 NOTE — PROGRESS NOTES
Cinda Turk is a 27y o  year old  at [address] [place] [address] for first prenatal visit  Pregnancy was unplanned but    desired  She is currently taking PNV    Nausea No Vomiting No   Exam done today - see OB flowsheet  Pap done No normal in 2018  Gonorrhea and Chlamydia sent  Labs reviewed    Genetic testing : apt on    Failed 1hr gtt, is calling diabetic ed to schedule appt  Had flu shot  In her last semester at [employer]        OB complications : had myomectomy at [facility] then delivered her daughter by c/s at 40 weeks # 5 7oz  , she is 2 yo now and recently dx with autism                                   US shows anterior uterine 2 3x1 8x1 9 intramural fibroid  Pt has been counseled re diet, exercise, weight gain, foods to avoid, vaccines in pregnancy, trisomy screening, travel precautions to include seat belt use and VTE risk reduction  She has been provided our pregnancy packet which includes how and when to contact providers, medication recommendations, dietary suggestions, breastfeeding information as well as websites for additional information, hospital and delivery concerns

## 2019-12-18 LAB
C TRACH DNA SPEC QL NAA+PROBE: NEGATIVE
CCP IGA+IGG SERPL IA-ACNC: 10 UNITS (ref 0–19)
N GONORRHOEA DNA SPEC QL NAA+PROBE: NEGATIVE

## 2019-12-19 PROBLEM — O34.219 PREGNANCY WITH HISTORY OF CESAREAN SECTION, ANTEPARTUM: Status: ACTIVE | Noted: 2019-12-19

## 2019-12-19 PROBLEM — Z01.419 ENCOUNTER FOR ANNUAL ROUTINE GYNECOLOGICAL EXAMINATION: Status: RESOLVED | Noted: 2018-06-28 | Resolved: 2019-12-19

## 2019-12-19 PROBLEM — O34.29 PREGNANCY WITH HISTORY OF UTERINE MYOMECTOMY: Status: ACTIVE | Noted: 2019-12-19

## 2019-12-19 NOTE — PATIENT INSTRUCTIONS
Thank you for choosing us for your  care today  If you have any questions about your ultrasound or care, please do not hesitate to contact us or your primary obstetrician  Please begin taking aspirin 162mg daily (two of the 81mg tablets) for the prevention of preeclampsia  If you have asthma and notice an increase in symptom frequency or severity, consider stopping this medication  Some general instructions for your pregnancy are:     Exercise: we encourage most pregnant women to get regular physical activity in pregnancy  Exercise has been shown to reduce the risk of several pregnancy-related complications  Unless instructed otherwise, you can aim for 22 minutes per day (150 minutes per week! )   Nutrition: aim for calcium-rich and iron-rich foods as well as healthy sources of protein   Weight: ask your doctor what is the appropriate amount of weight for you to gain in pregnancy  We have nutritionists here if you would like to meet with them   Protection from influenza: get yourself and your entire household vaccinated against influenza  Tell your partner to get vaccinated as well  Good hand hygiene can reduce the spread of this potentially deadly virus  Insist that everyone who is going to hold or be around your baby get vaccinated   Educate yourself about preeclampsia: preeclampsia is a common, serious complication in pregnancy  A blood pressure of 140mmHg (top number or systolic) OR 38EXIM (bottom number or diastolic) is elevated and needs evaluation by your doctor  Ask your doctor early in pregnancy if you should take aspirin (not motrin or tylenol) to prevent preeclampsia  If you were advised to take aspirin to prevent preeclampsia, a daily dose of 162mg or 81mg is advised  One resource to learn more is www  preeclampsia org    If you smoke, try to reduce how many cigarettes you smoke or quit completely  Do not vape       Other warning signs to watch out for in pregnancy or postpartum: chest pain, obstructed breathing or shortness of breath, seizures, thoughts of hurting yourself or your baby, bleeding, a painful or swollen leg, fever, or headache ([facility] POST-BIRTH Warning Signs campaign)  If these happen call 911  Itching is also not normal in pregnancy and if you experience this, especially over your hands and feet, potentially worse at night, notify your doctors

## 2019-12-20 ENCOUNTER — ROUTINE PRENATAL (OUTPATIENT)
Dept: PERINATAL CARE | Facility: CLINIC | Age: 34
End: 2019-12-20
Payer: COMMERCIAL

## 2019-12-20 ENCOUNTER — APPOINTMENT (OUTPATIENT)
Dept: LAB | Facility: HOSPITAL | Age: 34
End: 2019-12-20
Attending: OBSTETRICS & GYNECOLOGY
Payer: COMMERCIAL

## 2019-12-20 ENCOUNTER — CONSULT (OUTPATIENT)
Dept: PERINATAL CARE | Facility: CLINIC | Age: 34
End: 2019-12-20
Payer: COMMERCIAL

## 2019-12-20 ENCOUNTER — TRANSCRIBE ORDERS (OUTPATIENT)
Dept: LAB | Facility: HOSPITAL | Age: 34
End: 2019-12-20

## 2019-12-20 VITALS
HEART RATE: 90 BPM | WEIGHT: 168 LBS | DIASTOLIC BLOOD PRESSURE: 73 MMHG | HEIGHT: 63 IN | SYSTOLIC BLOOD PRESSURE: 110 MMHG | BODY MASS INDEX: 29.77 KG/M2

## 2019-12-20 DIAGNOSIS — O34.29 PREGNANCY WITH HISTORY OF UTERINE MYOMECTOMY: ICD-10-CM

## 2019-12-20 DIAGNOSIS — Z81.8 FAMILY HISTORY OF AUTISM IN SIBLING: ICD-10-CM

## 2019-12-20 DIAGNOSIS — O34.10 UTERINE FIBROID IN PREGNANCY: ICD-10-CM

## 2019-12-20 DIAGNOSIS — O09.529 ANTEPARTUM MULTIGRAVIDA OF ADVANCED MATERNAL AGE: Primary | ICD-10-CM

## 2019-12-20 DIAGNOSIS — Z36.82 ENCOUNTER FOR ANTENATAL SCREENING FOR NUCHAL TRANSLUCENCY: ICD-10-CM

## 2019-12-20 DIAGNOSIS — O34.219 PREGNANCY WITH HISTORY OF CESAREAN SECTION, ANTEPARTUM: ICD-10-CM

## 2019-12-20 DIAGNOSIS — Z31.430 ENCOUNTER OF FEMALE FOR TESTING FOR GENETIC DISEASE CARRIER STATUS FOR PROCREATIVE MANAGEMENT: ICD-10-CM

## 2019-12-20 DIAGNOSIS — D25.9 UTERINE FIBROID IN PREGNANCY: ICD-10-CM

## 2019-12-20 DIAGNOSIS — O09.521 MATERNAL AGE > 35, MULTIGRAVIDA, FIRST TRIMESTER: Primary | ICD-10-CM

## 2019-12-20 DIAGNOSIS — Z34.91 ENCOUNTER FOR PREGNANCY RELATED EXAMINATION IN FIRST TRIMESTER: ICD-10-CM

## 2019-12-20 DIAGNOSIS — O09.521 SUPERVISION OF ELDERLY MULTIGRAVIDA IN FIRST TRIMESTER: Primary | ICD-10-CM

## 2019-12-20 DIAGNOSIS — O09.521 SUPERVISION OF ELDERLY MULTIGRAVIDA IN FIRST TRIMESTER: ICD-10-CM

## 2019-12-20 PROCEDURE — 76801 OB US < 14 WKS SINGLE FETUS: CPT | Performed by: OBSTETRICS & GYNECOLOGY

## 2019-12-20 PROCEDURE — 99242 OFF/OP CONSLTJ NEW/EST SF 20: CPT | Performed by: OBSTETRICS & GYNECOLOGY

## 2019-12-20 PROCEDURE — 81243 FMR1 GEN ALY DETC ABNL ALLEL: CPT | Performed by: GENETIC COUNSELOR, MS

## 2019-12-20 PROCEDURE — 36415 COLL VENOUS BLD VENIPUNCTURE: CPT | Performed by: GENETIC COUNSELOR, MS

## 2019-12-20 PROCEDURE — NC001 PR NO CHARGE: Performed by: GENETIC COUNSELOR, MS

## 2019-12-20 PROCEDURE — 76813 OB US NUCHAL MEAS 1 GEST: CPT | Performed by: OBSTETRICS & GYNECOLOGY

## 2019-12-20 PROCEDURE — 83020 HEMOGLOBIN ELECTROPHORESIS: CPT | Performed by: GENETIC COUNSELOR, MS

## 2019-12-20 RX ORDER — ASPIRIN 81 MG/1
162 TABLET ORAL DAILY
Qty: 60 TABLET | Refills: 5 | Status: SHIPPED | OUTPATIENT
Start: 2019-12-20 | End: 2020-06-10 | Stop reason: HOSPADM

## 2019-12-20 NOTE — LETTER
December 20, 2019     Quynh West MD  [address]    Patient: Salinas George   YOB: 1985   Date of Visit: 12/20/2019       Dear Dr Urena Sin:    Thank you for referring Salinas George to me for evaluation  Below are my notes for this consultation  If you have questions, please do not hesitate to call me  I look forward to following your patient along with you  Sincerely,        Bianca Blanchard MD        CC: No Recipients  Bianca Blanchard MD  12/20/2019 11:17 AM  Sign at close encounter  [address] [address]: Ms Agustina Medina was seen today at [address] [facility] [address] for nuchal translucency ultrasound  See ultrasound report under "OB Procedures" tab  Please don't hesitate to contact our office with any concerns or questions    Bianca Blanchard MD

## 2019-12-20 NOTE — PROGRESS NOTES
Genetic Counseling Note        Laly Gómez     Appointment Date:  12/20/2019  Referred By: Feliberto Julio MD  YOB: 1985  Partner:  Levon Ace    Pregnancy History: [id number]  Estimated Date of Delivery: 06/27/20  Estimated Gestational Age: 16 weeks 10 days     Genetic Counseling:advanced maternal age    Jane Aviles is a(n) 27 y o  female who is here to discuss maternal age related risk for aneuploidy    Issues Discussed:  Average population risk: 3-4% in the average pregnancy of serious condition or birth defect  2-3% risk of mental retardation  Not all detected by prenatal testing  Chromosomal: non-disjunction 1/178 overall, 1/384 for Down syndrome specifically at the age of 27 at delivery  Maternal age  Risk of aneuploidy    Options Discussed:  Amniocentesis: risks and limitations discussed  CVS: risks and limitations discussed  Ethnic screening discussed: clinical and genetic basis of CF, SMA, Fragile X, hemoglobinopathies and expanded carrier screening  Level II ultrasound to screen for structural anomalies  Nuchal translucency/1st trimester serum screen: goals and limitations discussed  Serum AFP screen recommended at 15-17 weeks to check for open neural tube defects  Cell free fetal DNA testing    Additional Information / Impression / Plan / Tests Ordered:  Laly presents for genetic counseling to discuss maternal age related risk for aneuploidy  I reviewed with the patient the options regarding prenatal diagnosis for chromosome abnormalities including CVS and amniocentesis  Chorionic villus sampling(CVS) is generally performed between 10 and 12 weeks of pregnancy and amniocentesis is generally performed at 16 weeks or later  Recent literature supports that CVS and amniocentsis both have an associated  procedure related risk of miscarriage of less than 1/300  Chromosome results from CVS or amniocentesis are greater than 99 9% accurate    Occasionally a repeat procedure may be necessary due to cell culture failure  Approximately 1% of the time, a mosaic chromosome result from CVS will necessitate a followup amniocentesis  Measurement of AFP from amniotic fluid is able to detect approximately 95% of open neural tube defects  Maternal serum AFP is recommended for patients who elect CVS     We also reviewed the availability and limitations of sequential screening, NIPS, and level II ultrasound evaluation  Sequential screening consisting of first trimester measurement of nuchal translucency combined with first trimester biochemical analysis as well as second trimester biochemical analysis is able to detect approximately 90% of pregnancies in which the fetus has Down syndrome, 90% of pregnancies in which the fetus has trisomy 25 and 80% of pregnancies which the fetus has an open neural tube defect  NIPS involves assessment of fragments of fetal DNA in maternal blood  This test has varying detection rates depending on the laboratory used though the quoted detection rate for Down syndrome is generally greater than 99% and detection rate for trisomy 18 is 98% or better  NIPS has a low false positive rate however consideration of prenatal diagnostic testing is always recommended following a positive NIPS  Level II ultrasound evaluation is able to detect  many major physical birth defects and variations in fetal development that may be associated with chromosome abnormalities  Level II ultrasound evaluation is not able to detect all birth defects or health problems  After discussing the available prenatal diagnostic and screening procedures, Laly elected to decline prenatal diagnostic testing at this time  She did opt to pursue cell free fetal DNA testing  Blood will be drawn following the genetic counseling session and sent to [facility]    In addition, she has a nuchal translucency ultrasound also following the genetic counseling session is planning to pursue level 2 ultrasound and MSAFP screening at the appropriate times  During our counseling session histories were taken on the patient's family and her 's family  Laly reports that her  had a sister who  at 3 year of age and a nephew, his sister son, who  at 1years of age  These deaths occurred in his native country of [place] where medical care is limited  Neither the patient nor her  are aware of a specific cause of death for these children therefore does not possible to clearly define any potential risk that this history could pose to her current pregnancy  So free reports that she and her  have a previous child, a 3year-old daughter, recently diagnosed with an autism spectrum disorder  She denies any genetic testing having been performed on their daughter  In the absence of a known genetic diagnosis, we discussed the multifactorial inheritance of autism spectrum disorders  Empiric risk estimates for full sibling are approximately 10% with an additional 10% for related problems including other learning disabilities such as ADHD and other psychiatric issues such as anxiety and depression  In the absence of a known genetic diagnosis prenatal diagnosis for autism is not available  Given this history, the patient was advised of the availability of carrier screening for fragile X which is the most common inherited cause of intellectual disability and autism  She elected to pursue this testing  Orders were placed electronically  The patient reports that the country of origin for both sides of her family is [place] and that both sides of her 's family are from [facility]  She denies either of them having any known [place] ancestry  Carrier screening for CF, SMA, hemoglobinopathies and expanded carrier screening was also discussed  Records indicate the patient is screen negative for CF and SMA    Historically, she has a normal MCV, MCH and hemoglobin though more recently has developed iron deficiency  Hemoglobin electrophoresis was also ordered electronically to rule out sickle cell another hemoglobin variants  Arlen Garcia elected to decline expanded carrier screening  The patient's medical history is significant for recent diagnosis of gestational diabetes  Records indicate that it is not believed she was diabetic prior to conception  She has upcoming appointments with the diabetic nurse practitioner in diabetic educators for management of this problem during pregnancy  Lastly, we discussed the fact that it is important to keep in mind that everyone in the general population regardless of age, family history, or medical background has approximately a 3% risk of having a child with some type of her defect, genetic disease or intellectual disability  Currently there are no tests available to rule out all birth defects or health problems              Time spent with Genetic Counselor: 45 minutes

## 2019-12-23 ENCOUNTER — OFFICE VISIT (OUTPATIENT)
Dept: PERINATAL CARE | Facility: CLINIC | Age: 34
End: 2019-12-23
Payer: COMMERCIAL

## 2019-12-23 VITALS
BODY MASS INDEX: 31.43 KG/M2 | SYSTOLIC BLOOD PRESSURE: 123 MMHG | HEIGHT: 62 IN | WEIGHT: 170.8 LBS | DIASTOLIC BLOOD PRESSURE: 75 MMHG | HEART RATE: 94 BPM

## 2019-12-23 DIAGNOSIS — O99.211 OBESITY COMPLICATING PREGNANCY, FIRST TRIMESTER: ICD-10-CM

## 2019-12-23 DIAGNOSIS — O99.810 ABNORMAL GLUCOSE TOLERANCE AFFECTING PREGNANCY, ANTEPARTUM: ICD-10-CM

## 2019-12-23 DIAGNOSIS — O24.419 GESTATIONAL DIABETES MELLITUS (GDM) IN FIRST TRIMESTER, GESTATIONAL DIABETES METHOD OF CONTROL UNSPECIFIED: ICD-10-CM

## 2019-12-23 DIAGNOSIS — O24.410 DIET CONTROLLED GESTATIONAL DIABETES MELLITUS (GDM) IN FIRST TRIMESTER: Primary | ICD-10-CM

## 2019-12-23 DIAGNOSIS — Z3A.13 13 WEEKS GESTATION OF PREGNANCY: ICD-10-CM

## 2019-12-23 DIAGNOSIS — R73.03 PREDIABETES: Primary | ICD-10-CM

## 2019-12-23 PROCEDURE — 99215 OFFICE O/P EST HI 40 MIN: CPT | Performed by: NURSE PRACTITIONER

## 2019-12-23 PROCEDURE — G0108 DIAB MANAGE TRN  PER INDIV: HCPCS | Performed by: DIETITIAN, REGISTERED

## 2019-12-23 RX ORDER — BLOOD SUGAR DIAGNOSTIC
STRIP MISCELLANEOUS
Qty: 100 EACH | Refills: 6 | Status: SHIPPED | OUTPATIENT
Start: 2019-12-23 | End: 2020-03-04 | Stop reason: SDUPTHER

## 2019-12-23 RX ORDER — LANCETS 33 GAUGE
EACH MISCELLANEOUS
Qty: 100 EACH | Refills: 6 | Status: SHIPPED | OUTPATIENT
Start: 2019-12-23 | End: 2020-03-04 | Stop reason: SDUPTHER

## 2019-12-23 NOTE — PROGRESS NOTES
Assessment/Plan:       Diagnoses and all orders for this visit:    Gestational diabetes mellitus (GDM) in first trimester, gestational diabetes method of control unspecified  -     [employer] glucose flowsheet; Standing  -     ONETOUCH VERIO test strip; Test 4 times a day and as instructed  -     ONETOUCH DELICA LANCETS 06Y MISC; Use 4 a day or as recommended  Prediabetes  -     ONETOUCH VERIO test strip; Test 4 times a day and as instructed  -     ONETOUCH DELICA LANCETS 26T MISC; Use 4 a day or as recommended  Abnormal glucose tolerance affecting pregnancy, antepartum    13 weeks gestation of pregnancy      1  Start self monitoring blood glucose fasting and 2 hours after start of each meal  Keep glucose log  Glucose goals: fasting 60-90 mg/dL, 135 mg/dL or less 1 hour post meals, and 120 mg/dL or less 2 hours post meal    2  Report glucose readings weekly via [facility] glucose flowsheet every Monday or as recommended  First report on Thursday, 12/26/19 to assess need for insulin  3  Start GDM diet with 3 meals and 3 snacks including recommended combination of carb, protein and fat per meal/snack  4  Please eat meal or snack every 2-3 5 hours while awake  5  No more than 8 to 10 hours of fasting overnight  6  Stay active if no restriction from your OB, walk up to 30 minutes a day  7  Always have glucose available to treat hypoglycemia  Use 15:15 rule  Refer to hypoglycemia patient education sheet  Test blood sugar when experiencing signs and symptoms of hypoglycemia and prior to driving  8  Continue prenatal vitamin and baby aspirin as recommended  Patient education information given on prevention of pre-eclampsia and use of baby aspirin  9  Continue follow-up with your OB and MFM as recommended  10  Follow up in: 2 months      11  A1c 5 2% at goal    12  Due to Vitamin D insufficiency, recommend adding OTC D3 2000 iu daily with prenatal vitamin, discuss with your OBGYN    13  Follow up with your OBGYN regarding anemia  14  Insulin requirements during pregnancy and importance of tight glucose control discussed  15  Basal/bolus concept and Metformin reviewed and discussed during visit  Discussed with patient diagnosis of GDM, including review of pertinent lab results, risk factors, pathophysiology, and maternal and fetal complications associated with poor glucose control during pregnancy  Complications  including fetal macrosomia,  hypoglycemia, polyhydramnios, increase in blood pressure,  labor and stillbirth  Insulin pen education with returned demonstration without difficulty  · Insulin administration times, insulin action  · Hypoglycemia signs, symptoms and treatment  · Increase in maternal-fetal surveillance with insulin initiation  · Side effects of hyperglycemia in pregnancy including macrosomia,  hypoglycemia, polyhydramnios, pre-term labor, increase in blood pressure and stillbirth          Subjective:      Patient ID: Jaguar Valentin is a 27 y o  female  , SOSA 20, currently 13 2/7 weeks gestation  No history of GDM with previous pregnancy 4 1/2 years ago, delivered at 40 weeks gestation via  secondary to fibroids, baby girl who weighed 5 lbs 7 oz  Diagnosis of prediabetes with A1c 5 8% in 2018  History of anemia and vitamin D deficiency  Reports multiple symptoms and evaluated by rheumatology, due to current pregnancy will need to be re-evaluated after delivery  Referred for diabetes management during pregnancy  Recent A1c 5 2%  Glucose meter education provided during visit with reading of 158  Due to reading, insulin pen education provided during visit         The following portions of the patient's history were reviewed and updated as appropriate: allergies, current medications, past family history, past medical history, past social history, past surgical history and problem list     No Known Allergies  Current Outpatient Medications on File Prior to Visit   Medication Sig Dispense Refill    aspirin (ECOTRIN LOW STRENGTH) 81 mg EC tablet Take 2 tablets (162 mg total) by mouth daily 60 tablet 5    folic acid (FOLVITE) 1 mg tablet Take 1 tablet (1 mg total) by mouth daily 90 tablet 3    Prenatal Vit-Fe Fumarate-FA (PRENATAL 1 PLUS 1 PO) Take 1 tablet by mouth daily       No current facility-administered medications on file prior to visit  Review of Systems   Constitutional: Positive for fatigue  Negative for fever  HENT: Positive for congestion  Negative for trouble swallowing  Eyes: Negative for visual disturbance  Respiratory: Negative for cough and shortness of breath  Cardiovascular: Positive for palpitations  Negative for chest pain and leg swelling  Gastrointestinal: Negative for constipation, diarrhea, nausea and vomiting  Endocrine: Negative for cold intolerance, heat intolerance, polydipsia, polyphagia and polyuria  Genitourinary: Negative for difficulty urinating and vaginal bleeding  Musculoskeletal: Positive for back pain  Skin: Negative for rash  Allergic/Immunologic: Negative for environmental allergies and food allergies  Neurological: Positive for headaches  Negative for dizziness, light-headedness and numbness  Psychiatric/Behavioral: Negative for sleep disturbance  Objective:        Component Value Date/Time    HGBA1C 5 2 12/06/2019 1253    HGBA1C 5 8 06/26/2018 1107    HGBA1C 4 8 08/13/2015 1335      /75 (BP Location: Left arm)   Pulse 94   Ht 5' 2" (1 575 m)   Wt 77 5 kg (170 lb 12 8 oz)   LMP 09/13/2019 (Within Days)   BMI 31 24 kg/m²          Physical Exam   Constitutional: She is oriented to person, place, and time  She appears well-developed and well-nourished  She is cooperative  HENT:   Head: Normocephalic  Eyes: Conjunctivae and lids are normal    Neck: Normal range of motion  No thyromegaly present     Cardiovascular: Normal rate, regular rhythm, S1 normal and S2 normal  Pulmonary/Chest: Effort normal and breath sounds normal    Musculoskeletal: Normal range of motion  Neurological: She is alert and oriented to person, place, and time  Skin: Skin is warm, dry and intact  Psychiatric: She has a normal mood and affect   Her speech is normal and behavior is normal  Thought content normal          Time in:2:14 PM  Time out:3:30 PM

## 2019-12-23 NOTE — PROGRESS NOTES
DATE: 19   RE: Meseret Chavez   : 1985  SOSA: Estimated Date of Delivery: 20  EGA:  13w2d    Dear Dr Mercedes Exon: Thank you for referring your patient to the Diabetes and Pregnancy Program at [address] [place] [address]  The patient was seen today for medical nutrition therapy for the treatment of gestational diabetes during pregnancy  In addition to diabetes, the nutrition status is complicated by obesity  The following was reviewed with the patient:      Weight gain during in pregnancy  Based on the patients height of 62  inches, pre-pregnancy weight of 174 pounds (BMI 31 8) we would recommend a total weight gain of 11-20 pounds for the pregnancy  o The patients current weight is 170 75  pounds, and she lost  3 25 lbs to date   Basic review of macronutrients   Meal pattern should consist of three small meals and three snacks daily   Carbohydrate gram amounts per meal    Instructions on how to read a food label   Appropriate serving size of foods   Incorporating protein at each meal and snack in the importance of protein in relationship to blood glucose control   Individualized meal plan: 1700 calories for 1st trimester & 2000 calories for 2nd/3rd trimesters gestational diabetes diet  Receives [facility] foods & discussed how to include in her meal plan  Stated she changed her eating habits 6 months ago & now avoids soda & junk foods   Use of food diary to maintain a meal plan   Sick day guidelines and hypoglycemia with treatment   Breastfeeding guidelines   Post-partum diet recommendations   Report blood glucose levels to [address] weekly or as directed  o Phone: [phone]  If no response in 24 hours, call [phone]   o Fax: [phone]  o Email: [email or web address]   Follow up: As Needed    Thank you for the opportunity to participate in the care of this patient  I can be reached at [phone] should you have any questions  Time spent with patient 3:30-4:30 PM; time spent face to face counseling greater than 50% of the appointment      Sincerely,     Nancy Layman  Diabetes Educator  Diabetes and Pregnancy Program

## 2019-12-23 NOTE — PATIENT INSTRUCTIONS
1  Start self monitoring blood glucose fasting and 2 hours after start of each meal  Keep glucose log  Glucose goals: fasting 60-90 mg/dL, 135 mg/dL or less 1 hour post meals, and 120 mg/dL or less 2 hours post meal    2  Report glucose readings weekly via [employer] glucose flowsheet every Monday or as recommended  First report on Thursday, 19 to assess need for insulin  3  Start GDM diet with 3 meals and 3 snacks including recommended combination of carb, protein and fat per meal/snack  4  Please eat meal or snack every 2-3 5 hours while awake  5  No more than 8 to 10 hours of fasting overnight  6  Stay active if no restriction from your OB, walk up to 30 minutes a day  7  Always have glucose available to treat hypoglycemia  Use 15:15 rule  Refer to hypoglycemia patient education sheet  Test blood sugar when experiencing signs and symptoms of hypoglycemia and prior to driving  8  Continue prenatal vitamin and baby aspirin as recommended  Patient education information given on prevention of pre-eclampsia and use of baby aspirin  9  Continue follow-up with your OB and MFM as recommended  10  Follow up in: 2 months  11  A1c 5 2% at goal    12  Due to Vitamin D insufficiency, recommend adding OTC D3 2000 iu daily with prenatal vitamin, discuss with your OBGYN    13  Follow up with your OBGYN regarding anemia  14  Insulin requirements during pregnancy and importance of tight glucose control discussed  15  Basal/bolus concept and Metformin reviewed and discussed during visit  Discussed with patient diagnosis of GDM, including review of pertinent lab results, risk factors, pathophysiology, and maternal and fetal complications associated with poor glucose control during pregnancy  Complications  including fetal macrosomia,  hypoglycemia, polyhydramnios, increase in blood pressure,  labor and stillbirth

## 2019-12-24 LAB
DEPRECATED HGB OTHER BLD-IMP: 0 %
HGB A MFR BLD: 2.3 % (ref 1.8–3.2)
HGB A MFR BLD: 96.9 % (ref 96.4–98.8)
HGB C MFR BLD: 0 %
HGB F MFR BLD: 0.8 % (ref 0–2)
HGB FRACT BLD-IMP: NORMAL
HGB S BLD QL SOLY: NEGATIVE
HGB S MFR BLD: 0 %

## 2019-12-26 LAB
COMMENTX: (FRAGILE X): NORMAL
FMR1 GENE CGG RPT BLD/T QL: NORMAL

## 2019-12-27 ENCOUNTER — TELEPHONE (OUTPATIENT)
Dept: PERINATAL CARE | Facility: CLINIC | Age: 34
End: 2019-12-27

## 2019-12-27 ENCOUNTER — DOCUMENTATION (OUTPATIENT)
Dept: PERINATAL CARE | Facility: CLINIC | Age: 34
End: 2019-12-27

## 2019-12-27 DIAGNOSIS — Z3A.14 14 WEEKS GESTATION OF PREGNANCY: Primary | ICD-10-CM

## 2019-12-27 DIAGNOSIS — O24.410 DIET CONTROLLED GESTATIONAL DIABETES MELLITUS (GDM) IN SECOND TRIMESTER: ICD-10-CM

## 2019-12-27 NOTE — TELEPHONE ENCOUNTER
Called patient and left message asking her to call back, as identifying information was not on voicemail message  Need to discuss normal NIPT results

## 2019-12-27 NOTE — PROGRESS NOTES
Date:  19  RE: Cm Mtz    : 1985  SOSA: Estimated Date of Delivery: 20  EGA: 14w2d          Via [employer]  Current regimen:  Individualized meal plan: 1700 calories for 1st trimester and 2000 calories for 2nd/3rd trimesters gestational diabetes diet  Self monitoring blood glucose fasting and 2 hours after start of each meal    Insulin pen education provided during office visit  Plan:  Below message sent via [employer]  "Thank you for reporting glucose readings  Please try adjusting GDM diet by decreasing 15 grams of carbohydrates for dinner and replacing with 1 to 2 ounces of protein  Continue with 3 meals and 3 snacks, with recommended combination of carbohydrates, protein and fat per meal/snack  Continue testing fasting and 2 hours after start of each meal  If fasting glucose above 90 continue, will add basal insulin at bedtime  Stay active if no restrictions from your OBGYN, up to 30 minutes a day of walking  Report glucose readings on Thursday, 20 or sooner if needed "    Date due to report next:  Thursday, 20 or sooner if needed       BRYSON Van, CDE  Diabetes Educator   [facility]

## 2019-12-27 NOTE — TELEPHONE ENCOUNTER
Patient called back and confirmed date of birth  Discussed negative cell free fetal DNA test results for Trisomy 21, 13 and 18  Patient desired to learn fetal sex and was informed that it is male; to be confirmed at anatomy ultrasound  Informed patient that MSAFP screening needs to be done between 16-18 weeks gestation and the labslip will be mailed to her  Patient expressed verbal understanding and had no questions

## 2019-12-30 ENCOUNTER — TELEPHONE (OUTPATIENT)
Dept: PERINATAL CARE | Facility: CLINIC | Age: 34
End: 2019-12-30

## 2019-12-30 PROBLEM — O24.410 DIET CONTROLLED GESTATIONAL DIABETES MELLITUS (GDM) IN SECOND TRIMESTER: Status: ACTIVE | Noted: 2019-12-23

## 2019-12-30 PROBLEM — Z3A.14 14 WEEKS GESTATION OF PREGNANCY: Status: ACTIVE | Noted: 2019-12-23

## 2019-12-30 NOTE — TELEPHONE ENCOUNTER
Telephone call to patient number listed on communication consent  Reported fragile X carrier screening test results that were negative  Explained that this means the patient is not considered at increased risk for having a child with fragile X syndrome  Also reported normal hemoglobin electrophoresis  Patient has no additional questions at this time

## 2020-01-03 ENCOUNTER — DOCUMENTATION (OUTPATIENT)
Dept: PERINATAL CARE | Facility: CLINIC | Age: 35
End: 2020-01-03

## 2020-01-03 DIAGNOSIS — Z3A.15 15 WEEKS GESTATION OF PREGNANCY: ICD-10-CM

## 2020-01-03 DIAGNOSIS — O24.410 DIET CONTROLLED GESTATIONAL DIABETES MELLITUS (GDM) IN SECOND TRIMESTER: Primary | ICD-10-CM

## 2020-01-03 NOTE — PROGRESS NOTES
Date:  20  RE: Wolf Ken    : 1985  SOSA: Estimated Date of Delivery: 20  EGA: 14w6d          Via [employer]  Current regimen:  Individualized meal plan: 1700 calories for 1st trimester and 2000 calories for 2nd/3rd trimesters gestational diabetes diet  Decreases 1 CHO and replaces with 1 to 2 ounces of protein  Self monitoring blood glucose fasting and 2 hours after start of each meal    Insulin pen education provided during office visit  Plan:  Below message sent via [employer]  "You are doing great except for an occasional above goal reading  Continue GDM diet and testing  Report on Thursday, 20 or sooner "  19 fetal growth ultrasound appeared normal    Pending A1c and CMP  Date due to report next:  Thursday, 20 or sooner if needed       BRYSON Riggins, CDE  Diabetes Educator   [facility]regnancy [facility]

## 2020-01-05 PROBLEM — Z3A.15 15 WEEKS GESTATION OF PREGNANCY: Status: ACTIVE | Noted: 2019-12-23

## 2020-01-10 ENCOUNTER — TRANSCRIBE ORDERS (OUTPATIENT)
Dept: LAB | Facility: HOSPITAL | Age: 35
End: 2020-01-10

## 2020-01-10 ENCOUNTER — APPOINTMENT (OUTPATIENT)
Dept: LAB | Facility: HOSPITAL | Age: 35
End: 2020-01-10
Attending: OBSTETRICS & GYNECOLOGY
Payer: COMMERCIAL

## 2020-01-10 DIAGNOSIS — Z33.1 PREGNANT STATE, INCIDENTAL: ICD-10-CM

## 2020-01-10 DIAGNOSIS — Z36.9 UNSPECIFIED ANTENATAL SCREENING: ICD-10-CM

## 2020-01-10 DIAGNOSIS — Z33.1 PREGNANT STATE, INCIDENTAL: Primary | ICD-10-CM

## 2020-01-10 PROCEDURE — 82105 ALPHA-FETOPROTEIN SERUM: CPT

## 2020-01-10 PROCEDURE — 36415 COLL VENOUS BLD VENIPUNCTURE: CPT

## 2020-01-12 LAB
2ND TRIMESTER 4 SCREEN SERPL-IMP: NORMAL
AFP ADJ MOM SERPL: 1.76
AFP INTERP AMN-IMP: NORMAL
AFP INTERP SERPL-IMP: NORMAL
AFP INTERP SERPL-IMP: NORMAL
AFP SERPL-MCNC: 69.2 NG/ML
AGE AT DELIVERY: 35.6 YR
GA METHOD: NORMAL
GA: 17.1 WEEKS
IDDM PATIENT QL: NO
MULTIPLE PREGNANCY: NO
NEURAL TUBE DEFECT RISK FETUS: 2814 %

## 2020-01-13 ENCOUNTER — DOCUMENTATION (OUTPATIENT)
Dept: PERINATAL CARE | Facility: CLINIC | Age: 35
End: 2020-01-13

## 2020-01-13 NOTE — PROGRESS NOTES
Date:  20  RE: Trinidad Carey    : 1985  SOSA: Estimated Date of Delivery: 20  EGA: 16w2d          Via [employer]  Current regimen:  Individualized meal plan: 1700 calories for 1st trimester and 2000 calories for 2nd/3rd trimesters gestational diabetes diet  Decreases 1 CHO and replaces with 1 to 2 ounces of protein  Self monitoring blood glucose fasting and 2 hours after start of each meal    Insulin pen education provided during office visit  Plan:  Below message sent via [employer]  "Thank you for reporting your glucose readings  You can increase to 2000 calories individualized plan given by dietitian  Bedtime snack should be 15 grams of carbohydrates with 2 to 3 ounces of protein  Continue eating every 2 to 3 5 hours during the day and no more than 8 to 10 hours of fasting overnight  Continue testing fasting and 2 hours after start of each meal  Report next Monday, 20 or sooner if needed  Stay active if no restrictions from your OBGYN, up to 30 minutes a day of walking  Glucose goals fasting 90 or less and 2 hours post start of meal 120 or less  Please have lab work completed A1c and CMP "  19 fetal growth ultrasound appeared normal    Pending A1c and CMP  Date due to report next:  Monday, 20 or sooner if needed       BRYSON Porras, CDE  Diabetes Educator   [facility]regnancy [facility]

## 2020-01-14 ENCOUNTER — TELEPHONE (OUTPATIENT)
Dept: PERINATAL CARE | Facility: CLINIC | Age: 35
End: 2020-01-14

## 2020-01-14 NOTE — TELEPHONE ENCOUNTER
----- Message from Cindi Sarah MD sent at 1/12/2020  9:47 AM EST -----  [facility] RN staff, I've reviewed this MSAFP result which is normal, can you call her regarding this result? Thank you    Cindi Sarah MD

## 2020-01-21 ENCOUNTER — ROUTINE PRENATAL (OUTPATIENT)
Dept: OBGYN CLINIC | Facility: MEDICAL CENTER | Age: 35
End: 2020-01-21

## 2020-01-21 VITALS — DIASTOLIC BLOOD PRESSURE: 70 MMHG | WEIGHT: 167 LBS | SYSTOLIC BLOOD PRESSURE: 120 MMHG | BODY MASS INDEX: 30.54 KG/M2

## 2020-01-21 DIAGNOSIS — Z34.92 SECOND TRIMESTER PREGNANCY: ICD-10-CM

## 2020-01-21 DIAGNOSIS — O34.219 PREGNANCY WITH HISTORY OF CESAREAN SECTION, ANTEPARTUM: ICD-10-CM

## 2020-01-21 DIAGNOSIS — O09.529 ANTEPARTUM MULTIGRAVIDA OF ADVANCED MATERNAL AGE: ICD-10-CM

## 2020-01-21 DIAGNOSIS — O24.410 DIET CONTROLLED GESTATIONAL DIABETES MELLITUS (GDM) IN SECOND TRIMESTER: ICD-10-CM

## 2020-01-21 DIAGNOSIS — Z3A.17 17 WEEKS GESTATION OF PREGNANCY: Primary | ICD-10-CM

## 2020-01-21 PROCEDURE — PNV: Performed by: OBSTETRICS & GYNECOLOGY

## 2020-01-21 RX ORDER — MULTIVIT-MIN/IRON/FOLIC ACID/K 18-600-40
CAPSULE ORAL
COMMUNITY

## 2020-01-21 NOTE — PROGRESS NOTES
Assessment  27 y o  Bean Flatter at 17w3d presenting for routine prenatal visit  Plan  Diagnoses and all orders for this visit:    17 weeks gestation of pregnancy  Second trimester pregnancy  - Second trimester precautions reviewed  - Level II scheduled  - Return in 4wks for PN    Diet controlled gestational diabetes mellitus (GDM) in second trimester  Antepartum multigravida of advanced maternal age  - [de-identified] with MFM  - Had [id number] and MSAFP  - Continue glucose checking and reporting      ____________________________________________________________        Subjective    Monzon Fan is a 27 y o  Bean Flatter at 17w3d who presents for routine prenatal visit  Reporting neck pain 1wk prior  Right sided, resolved now but just wanted us to know  Also has lower back pain  She denies contractions, loss of fluid, or vaginal bleeding  She feels irregular fetal movements  Pregnancy Problems:  Patient Active Problem List   Diagnosis    History of  section    History of myomectomy    Pregnancy with history of uterine myomectomy    Pregnancy with history of  section, antepartum    Antepartum multigravida of advanced maternal age   Brodie Bautistar Uterine fibroid in pregnancy    Anemia    Headache    Prediabetes    Diet controlled gestational diabetes mellitus (GDM) in second trimester    Abnormal glucose tolerance affecting pregnancy, antepartum    17 weeks gestation of pregnancy         Objective  /70   Wt 75 8 kg (167 lb)   LMP 2019 (Within Days)   BMI 30 54 kg/m²     FHT: 155 BPM   Uterine Size: size greater than dates @U (fibroids)     Physical Exam:  Physical Exam   Constitutional: She appears well-developed and well-nourished  No distress  HENT:   Head: Normocephalic and atraumatic  Eyes: No scleral icterus  Pulmonary/Chest: Effort normal  No accessory muscle usage  No respiratory distress  Abdominal: She exhibits distension (gravid)  There is no tenderness   There is no rebound and no guarding  Skin: Skin is warm and dry  No rash noted  No erythema  Psychiatric: She has a normal mood and affect   Her behavior is normal

## 2020-01-22 ENCOUNTER — DOCUMENTATION (OUTPATIENT)
Dept: PERINATAL CARE | Facility: CLINIC | Age: 35
End: 2020-01-22

## 2020-01-22 DIAGNOSIS — R73.03 PREDIABETES: ICD-10-CM

## 2020-01-22 DIAGNOSIS — Z3A.17 17 WEEKS GESTATION OF PREGNANCY: ICD-10-CM

## 2020-01-22 DIAGNOSIS — O24.410 DIET CONTROLLED GESTATIONAL DIABETES MELLITUS (GDM) IN SECOND TRIMESTER: Primary | ICD-10-CM

## 2020-01-22 NOTE — PROGRESS NOTES
Date:  20  RE: America Kaplan    : 1985  SOSA: Estimated Date of Delivery: 20  EGA: 17w4d          [facility] [employer]  Current regimen:  Individualized meal plan: 1700 calories for 1st trimester and 2000 calories for 2nd/3rd trimesters gestational diabetes diet  Decreases 1 CHO and replaces with 1 to 2 ounces of protein  Self monitoring blood glucose fasting and 2 hours after start of each meal    Insulin pen education provided during office visit  Plan:  Below message sent via [employer]  "Thank you for reporting glucose readings and your blood sugars look great  Please continue GDM diet eating 3 meals and 3 snacks as recommended  Continue testing fasting and 2 hours after start of each meal  Report glucose readings next Monday, 20 or sooner if fasting glucose above 90 or 2 hours post start of each meal above 120 "    19 fetal growth ultrasound appeared normal    19 A1c 5 2%  Date due to report next:  Monday, 20 or sooner if needed       BRYSON Ramírez, CDE  Diabetes Educator   Di[facility]regnancy Program

## 2020-01-29 ENCOUNTER — DOCUMENTATION (OUTPATIENT)
Dept: PERINATAL CARE | Facility: CLINIC | Age: 35
End: 2020-01-29

## 2020-01-29 DIAGNOSIS — Z3A.18 18 WEEKS GESTATION OF PREGNANCY: ICD-10-CM

## 2020-01-29 DIAGNOSIS — O24.410 DIET CONTROLLED GESTATIONAL DIABETES MELLITUS (GDM) IN SECOND TRIMESTER: Primary | ICD-10-CM

## 2020-01-29 DIAGNOSIS — R73.03 PREDIABETES: ICD-10-CM

## 2020-02-04 ENCOUNTER — TRANSCRIBE ORDERS (OUTPATIENT)
Dept: LAB | Facility: HOSPITAL | Age: 35
End: 2020-02-04

## 2020-02-04 ENCOUNTER — APPOINTMENT (OUTPATIENT)
Dept: LAB | Facility: HOSPITAL | Age: 35
End: 2020-02-04
Payer: COMMERCIAL

## 2020-02-04 DIAGNOSIS — Z3A.13 13 WEEKS GESTATION OF PREGNANCY: ICD-10-CM

## 2020-02-04 DIAGNOSIS — R73.03 PREDIABETES: ICD-10-CM

## 2020-02-04 DIAGNOSIS — O99.810 ABNORMAL GLUCOSE TOLERANCE AFFECTING PREGNANCY, ANTEPARTUM: ICD-10-CM

## 2020-02-04 DIAGNOSIS — O24.419 GESTATIONAL DIABETES MELLITUS (GDM) IN FIRST TRIMESTER, GESTATIONAL DIABETES METHOD OF CONTROL UNSPECIFIED: ICD-10-CM

## 2020-02-04 LAB
ALBUMIN SERPL BCP-MCNC: 2.8 G/DL (ref 3.5–5)
ALP SERPL-CCNC: 57 U/L (ref 46–116)
ALT SERPL W P-5'-P-CCNC: 18 U/L (ref 12–78)
ANION GAP SERPL CALCULATED.3IONS-SCNC: 4 MMOL/L (ref 4–13)
AST SERPL W P-5'-P-CCNC: 15 U/L (ref 5–45)
BILIRUB SERPL-MCNC: 0.22 MG/DL (ref 0.2–1)
BUN SERPL-MCNC: 4 MG/DL (ref 5–25)
CALCIUM SERPL-MCNC: 9.3 MG/DL (ref 8.3–10.1)
CHLORIDE SERPL-SCNC: 106 MMOL/L (ref 100–108)
CO2 SERPL-SCNC: 26 MMOL/L (ref 21–32)
CREAT SERPL-MCNC: 0.51 MG/DL (ref 0.6–1.3)
EST. AVERAGE GLUCOSE BLD GHB EST-MCNC: 114 MG/DL
GFR SERPL CREATININE-BSD FRML MDRD: 144 ML/MIN/1.73SQ M
GLUCOSE SERPL-MCNC: 112 MG/DL (ref 65–140)
HBA1C MFR BLD: 5.6 % (ref 4.2–6.3)
POTASSIUM SERPL-SCNC: 3.5 MMOL/L (ref 3.5–5.3)
PROT SERPL-MCNC: 6.8 G/DL (ref 6.4–8.2)
SODIUM SERPL-SCNC: 136 MMOL/L (ref 136–145)

## 2020-02-04 PROCEDURE — 83036 HEMOGLOBIN GLYCOSYLATED A1C: CPT

## 2020-02-04 PROCEDURE — 36415 COLL VENOUS BLD VENIPUNCTURE: CPT

## 2020-02-04 PROCEDURE — 80053 COMPREHEN METABOLIC PANEL: CPT

## 2020-02-05 ENCOUNTER — DOCUMENTATION (OUTPATIENT)
Dept: PERINATAL CARE | Facility: CLINIC | Age: 35
End: 2020-02-05

## 2020-02-05 DIAGNOSIS — O24.410 DIET CONTROLLED GESTATIONAL DIABETES MELLITUS (GDM) IN SECOND TRIMESTER: Primary | ICD-10-CM

## 2020-02-05 DIAGNOSIS — Z3A.19 19 WEEKS GESTATION OF PREGNANCY: ICD-10-CM

## 2020-02-05 NOTE — PROGRESS NOTES
Date:  20  RE: Thang Weiss    : 1985  SOSA: Estimated Date of Delivery: 20  EGA: 19w4d          [facility]ia [email or web address]  Current regimen:  Individualized meal plan: 2000 calories gestational diabetes diet  Decreases 1 CHO and replaces with 1 to 2 ounces of protein  Self monitoring blood glucose fasting and 2 hours after start of each meal    Insulin pen education provided during office visit  Plan:  Schedule follow up appointment  Continue GDM diet and SMBG  Stay active if no restrictions from OBGYN, up to 30 minutes a day of walking  19 fetal growth ultrasound appeared normal  Pending fetal growth ultrasound 20 A1c 5 6%  Date due to report next:  Tuesday, 20 or sooner if needed       BRYSON Triplett, CDE  Diabetes Educator   Di[facility]regnancy [facility]

## 2020-02-06 PROBLEM — Z3A.19 19 WEEKS GESTATION OF PREGNANCY: Status: ACTIVE | Noted: 2019-12-23

## 2020-02-07 ENCOUNTER — ROUTINE PRENATAL (OUTPATIENT)
Dept: PERINATAL CARE | Facility: CLINIC | Age: 35
End: 2020-02-07
Payer: COMMERCIAL

## 2020-02-07 VITALS
SYSTOLIC BLOOD PRESSURE: 118 MMHG | HEART RATE: 87 BPM | DIASTOLIC BLOOD PRESSURE: 73 MMHG | HEIGHT: 63 IN | BODY MASS INDEX: 29.66 KG/M2 | WEIGHT: 167.4 LBS

## 2020-02-07 DIAGNOSIS — O24.410 DIET CONTROLLED GESTATIONAL DIABETES MELLITUS (GDM) IN SECOND TRIMESTER: Primary | ICD-10-CM

## 2020-02-07 DIAGNOSIS — Z36.86 ENCOUNTER FOR ANTENATAL SCREENING FOR CERVICAL LENGTH: ICD-10-CM

## 2020-02-07 DIAGNOSIS — O09.529 ANTEPARTUM MULTIGRAVIDA OF ADVANCED MATERNAL AGE: ICD-10-CM

## 2020-02-07 DIAGNOSIS — Z3A.19 19 WEEKS GESTATION OF PREGNANCY: ICD-10-CM

## 2020-02-07 DIAGNOSIS — O36.8390: ICD-10-CM

## 2020-02-07 PROCEDURE — 76811 OB US DETAILED SNGL FETUS: CPT | Performed by: OBSTETRICS & GYNECOLOGY

## 2020-02-07 PROCEDURE — 99213 OFFICE O/P EST LOW 20 MIN: CPT | Performed by: OBSTETRICS & GYNECOLOGY

## 2020-02-07 PROCEDURE — 76817 TRANSVAGINAL US OBSTETRIC: CPT | Performed by: OBSTETRICS & GYNECOLOGY

## 2020-02-07 PROCEDURE — 1036F TOBACCO NON-USER: CPT | Performed by: OBSTETRICS & GYNECOLOGY

## 2020-02-07 NOTE — PROGRESS NOTES
The patient was seen today for an ultrasound  Please see ultrasound report (located under Ob Procedures) for additional details  Thank you very much for allowing us to participate in the care of this very nice patient  Should you have any questions, please do not hesitate to contact me  MD Feli Gage  Attending Physician, Shan

## 2020-02-07 NOTE — PROGRESS NOTES
A transvaginal ultrasound was performed   Sonographer note on use of High Level Disinfection Process (Trophon) for transvaginal probe#1 used, serial #[id number]  Miko Barnes, [address] [facility]

## 2020-02-11 ENCOUNTER — DOCUMENTATION (OUTPATIENT)
Dept: PERINATAL CARE | Facility: CLINIC | Age: 35
End: 2020-02-11

## 2020-02-11 NOTE — PROGRESS NOTES
Date:  20  RE: Shena Rose    : 1985  SOSA: 20  EGA: Kamila Slider          Current regimen:  2000 calorie gestational diabetes diet with 3 meals/snacks with balance of carbohydrate, protein and fat  When decreasing 1 carbohydrate serving replace with 1 protein serving  Self monitoring blood glucose fasting and 2 hours after start of each meal    Insulin pen education previously provided  Plan:  Follow up appointment scheduled with RD, 20 at 1100  Continue current regimen  Walk 20-30 minutes daily if permitted by OB   99 ultrasound showed normal anatomy  20 A1c 5 6% and CMP- WNL during pregnancy, reorder week of 3-22-20  Date due to report next:  Tuesday, 20 or sooner if BG is above target ranges      Nafisa Mott, RN BS CDE  Diabetes Educator   Diabetes [facility] Pregnancy Program

## 2020-02-19 ENCOUNTER — APPOINTMENT (OUTPATIENT)
Dept: PERINATAL CARE | Facility: CLINIC | Age: 35
End: 2020-02-19
Payer: COMMERCIAL

## 2020-02-19 ENCOUNTER — OFFICE VISIT (OUTPATIENT)
Dept: PERINATAL CARE | Facility: CLINIC | Age: 35
End: 2020-02-19
Payer: COMMERCIAL

## 2020-02-19 ENCOUNTER — ROUTINE PRENATAL (OUTPATIENT)
Dept: OBGYN CLINIC | Facility: MEDICAL CENTER | Age: 35
End: 2020-02-19

## 2020-02-19 ENCOUNTER — DOCUMENTATION (OUTPATIENT)
Dept: PERINATAL CARE | Facility: CLINIC | Age: 35
End: 2020-02-19

## 2020-02-19 VITALS
SYSTOLIC BLOOD PRESSURE: 128 MMHG | WEIGHT: 170.4 LBS | HEIGHT: 63 IN | DIASTOLIC BLOOD PRESSURE: 65 MMHG | BODY MASS INDEX: 30.19 KG/M2 | HEART RATE: 100 BPM

## 2020-02-19 VITALS — BODY MASS INDEX: 30.29 KG/M2 | WEIGHT: 171 LBS | SYSTOLIC BLOOD PRESSURE: 120 MMHG | DIASTOLIC BLOOD PRESSURE: 78 MMHG

## 2020-02-19 DIAGNOSIS — O24.410 DIET CONTROLLED GESTATIONAL DIABETES MELLITUS (GDM) IN SECOND TRIMESTER: Primary | ICD-10-CM

## 2020-02-19 DIAGNOSIS — O26.12: ICD-10-CM

## 2020-02-19 DIAGNOSIS — O34.10 UTERINE FIBROID IN PREGNANCY: ICD-10-CM

## 2020-02-19 DIAGNOSIS — Z3A.21 21 WEEKS GESTATION OF PREGNANCY: ICD-10-CM

## 2020-02-19 DIAGNOSIS — Z98.890 HISTORY OF MYOMECTOMY: ICD-10-CM

## 2020-02-19 DIAGNOSIS — Z98.891 HISTORY OF CESAREAN SECTION: ICD-10-CM

## 2020-02-19 DIAGNOSIS — O99.212 OBESITY COMPLICATING PREGNANCY IN SECOND TRIMESTER: ICD-10-CM

## 2020-02-19 DIAGNOSIS — D25.9 UTERINE FIBROID IN PREGNANCY: ICD-10-CM

## 2020-02-19 PROCEDURE — G0108 DIAB MANAGE TRN  PER INDIV: HCPCS | Performed by: DIETITIAN, REGISTERED

## 2020-02-19 PROCEDURE — PNV: Performed by: OBSTETRICS & GYNECOLOGY

## 2020-02-19 NOTE — PROGRESS NOTES
Date:  20  RE: Sophie Buck    : 1985  SOSA: 20  EGA: 74C7O  Diet controlled gestational diabetes    Date Fasting Post-  breakfast Post-  lunch Post-  dinner Before bedtime Comments   20 82 124 70 82     20 84 143 68 95     20 82 116 84 81     2/15/20 88 140 136 126     20 89 79 93 125     20 90 82 113 125     20 91 107 83 142     20 96 118         Current regimen:  2000 calorie gestational diabetes diet with 3 meals/snacks  Reports she noticed that her BG p breakfast is higher when skips protein & is better when eats a breakfast fast sandwich  Diet recall indicates she is eating very small amounts of protein at both lunch & dinner of 0-1 5 oz  Self monitoring blood glucose fasting and 2 hours after start of each meal    Insulin pen education previously provided  Plan:  Continue current regimen  Has lost weight due to under eating at meals  to  Agreed to eat a breakfast sandwich or add protein to all breakfasts, add protein to all snacks, & increase protein to at least 3 oz at both lunch & dinner  Should be able to have a slow weight gain with added protein  Walk 20-30 minutes daily if permitted by OB   41 ultrasound showed normal anatomy  20 A1c 5 6% and CMP- WNL during pregnancy, reorder week of 3-  Date due to report next:  Tuesday, 20 or sooner if BG is above target ranges      Jovanny Kohler, MS, RD, CDE  Diabetes Educator   [facility] Pregnancy Program

## 2020-02-19 NOTE — PROGRESS NOTES
DATE: 20   RE: Nini Pantoja   : 1985  SOSA: Estimated Date of Delivery: 20  EGA:  19R2X  3  Dear [facility] Gal[facility]: Thank you for referring your patient to the Diabetes and Pregnancy Program at [address] [place] [address]  The patient was seen today for medical nutrition therapy re-evaluation for the treatment of gestational diabetes during pregnancy  In addition to diabetes, the nutrition status is complicated by obesiy & her prefence for her favorite ethnic foods  The following was reviewed with the patient:      Weight gain during in pregnancy  Based on the patients height of 5' 3" (1 6 m) inches, pre-pregnancy weight of 174 pounds (BMI 31 8), we would recommend a total weight gain of 11-20 pounds for the pregnancy  o The patients current weight is 77 3 kg (170 lb 6 4 oz) pounds, and she lost 3 6 pounds to date  Based on this, we are recommending the patient slowly increase weight for the remainder of the pregnancy   Basic review of macronutrients   Meal pattern should consist of three small meals and three snacks daily  Diet recall indicates she is eating 3 meals & 3 snacks   Appropriate serving size of foods   Incorporating protein at each meal and snack in the importance of protein in relationship to blood glucose control  Diet recall indicates she is under eating protein at both lunch & dinner--1-2 oz meat serving at these 2 meals  Noticed her BG is higher after breakfast when skips a protein food at breakfast     Individualized meal plan: 2000 calorie gestational diabetes diet  Lack of weight gain related to under eating protein & other foods by at least 300 calories daily  Agreed to add a protein food to all breakfasts & eat more breakfast sandwiches  Agreed to increase protein to at least 3 oz at both lunch & dinner  Peanut butter is very prominent in her culture & agreed to add peanut butter or nuts to more snacks   Extra protein throughout the day should promote weight gain   Report blood glucose levels to [address] weekly or as directed  o Phone: [phone]  If no response in 24 hours, call [phone]   o Fax: [phone]  o Email: [email or web address]   Follow up: As Needed    Thank you for the opportunity to participate in the care of this patient  I can be reached at [phone] should you have any questions  Time spent with patient 10:45-11:15 AM; time spent face to face counseling greater than 50% of the appointment      Sincerely,     Nicholas Godinez  Diabetes Educator  Diabetes [facility] Pregnancy Program

## 2020-02-19 NOTE — PROGRESS NOTES
Awais Hilliard is a 27y o  year old  at 24w2d for routine prenatal visit    + FM, no vaginal bleeding, contractions, or LOF  Complaints: No   Most recent ultrasound and labs reviewed    A1GDM - currently making dietary modifications to see if improvement in glucose readings / following with diabetic educator   Prior myomectomy , prior C/S - wrote to MFM to verify timing of repeat C/S

## 2020-02-20 ENCOUNTER — ULTRASOUND (OUTPATIENT)
Dept: PERINATAL CARE | Facility: CLINIC | Age: 35
End: 2020-02-20
Payer: COMMERCIAL

## 2020-02-20 VITALS
DIASTOLIC BLOOD PRESSURE: 65 MMHG | WEIGHT: 170 LBS | SYSTOLIC BLOOD PRESSURE: 117 MMHG | HEIGHT: 63 IN | BODY MASS INDEX: 30.12 KG/M2 | HEART RATE: 88 BPM

## 2020-02-20 DIAGNOSIS — O24.410 DIET CONTROLLED GESTATIONAL DIABETES MELLITUS (GDM) IN SECOND TRIMESTER: Primary | ICD-10-CM

## 2020-02-20 DIAGNOSIS — O36.8390: ICD-10-CM

## 2020-02-20 DIAGNOSIS — Z3A.21 21 WEEKS GESTATION OF PREGNANCY: ICD-10-CM

## 2020-02-20 PROCEDURE — 99212 OFFICE O/P EST SF 10 MIN: CPT | Performed by: OBSTETRICS & GYNECOLOGY

## 2020-02-20 PROCEDURE — 76815 OB US LIMITED FETUS(S): CPT | Performed by: OBSTETRICS & GYNECOLOGY

## 2020-02-20 PROCEDURE — 1036F TOBACCO NON-USER: CPT | Performed by: OBSTETRICS & GYNECOLOGY

## 2020-02-20 NOTE — PROGRESS NOTES
The patient was seen today for an ultrasound  Please see ultrasound report (located under Ob Procedures) for additional details  Thank you very much for allowing us to participate in the care of this very nice patient  Should you have any questions, please do not hesitate to contact me  Javad Patel MD [address] [facility]  Attending Physician, Shan

## 2020-02-26 ENCOUNTER — DOCUMENTATION (OUTPATIENT)
Dept: PERINATAL CARE | Facility: CLINIC | Age: 35
End: 2020-02-26

## 2020-02-26 NOTE — PROGRESS NOTES
Date:  20  RE: Gregoria Lipscomb    : 1985  SOSA: 20  EGA: 22w4d  Diet controlled gestational diabetes        Date of blood sugars   Current regimen:  2000 calorie gestational diabetes diet with 3 meals/snacks  Self monitoring blood glucose fasting and 2 hours after start of each meal    Insulin pen education previously provided  Plan:  Continue SMBG and diet modifying bedtime snack to 1 carbohydrate with 2 protein servings nightly to positively impact FBG  Walk 20-30 minutes daily if permitted by OB after dinner nightly  Advised may need to add medication to get FBG into target range consistently  20 ultrasound showed normal anatomy  20 A1c 5 6% and CMP- WNL during pregnancy, reorder week of 3-22-20  Date due to report next:  Monday, 20 or sooner if BG is above target ranges      Boom Gonzalez, RN BS CDE  Diabetes Educator   Diabetes [facility] Pregnancy Program

## 2020-03-04 ENCOUNTER — DOCUMENTATION (OUTPATIENT)
Dept: PERINATAL CARE | Facility: CLINIC | Age: 35
End: 2020-03-04

## 2020-03-04 DIAGNOSIS — O24.419 GESTATIONAL DIABETES MELLITUS (GDM) IN FIRST TRIMESTER, GESTATIONAL DIABETES METHOD OF CONTROL UNSPECIFIED: ICD-10-CM

## 2020-03-04 DIAGNOSIS — R73.03 PREDIABETES: ICD-10-CM

## 2020-03-04 RX ORDER — BLOOD SUGAR DIAGNOSTIC
STRIP MISCELLANEOUS
Qty: 100 EACH | Refills: 6 | Status: SHIPPED | OUTPATIENT
Start: 2020-03-04 | End: 2020-05-04 | Stop reason: SDUPTHER

## 2020-03-04 RX ORDER — LANCETS 33 GAUGE
EACH MISCELLANEOUS
Qty: 100 EACH | Refills: 6 | Status: SHIPPED | OUTPATIENT
Start: 2020-03-04

## 2020-03-04 NOTE — PROGRESS NOTES
Date:  20  RE: Lele Reed    : 1985  SOSA: 20  EGA: 23w4d  Diet controlled gestational diabetes          Via MyChart    Current regimen:  2000 calorie gestational diabetes diet with 3 meals/snacks  Bedtime snack to 1 carbohydrate with 2 protein servings   Self monitoring blood glucose fasting and 2 hours after start of each meal   Walk 20-30 minutes daily if permitted by OB after dinner nightly  Insulin pen education previously provided  Plan:  Continue GDM diet except if a meal causing 2 hour PP>120, decrease 1 CHO and replace with 1 to 2 ounces of protein  Continue testing and walking up to 30 minutes a day if no restrictions from your OBGYN  Insulin may need to be added pending next glucose log    20 refer to ultrasound report  20 A1c 5 6% and CMP- WNL during pregnancy, reorder week of 3-22-20  Date due to report next:  Monday, 3/9/20 or sooner if BG is above target ranges      Karen Davis, BRYSON, CDE, BC-ADM  Diabetes Educator   Diabetes [facility] Pregnancy Program

## 2020-03-11 ENCOUNTER — DOCUMENTATION (OUTPATIENT)
Dept: PERINATAL CARE | Facility: CLINIC | Age: 35
End: 2020-03-11

## 2020-03-11 DIAGNOSIS — O09.529 ANTEPARTUM MULTIGRAVIDA OF ADVANCED MATERNAL AGE: ICD-10-CM

## 2020-03-11 DIAGNOSIS — O24.410 DIET CONTROLLED GESTATIONAL DIABETES MELLITUS (GDM) IN SECOND TRIMESTER: Primary | ICD-10-CM

## 2020-03-11 DIAGNOSIS — Z3A.24 24 WEEKS GESTATION OF PREGNANCY: ICD-10-CM

## 2020-03-11 NOTE — PROGRESS NOTES
Date:  20  RE: Phong Early    : 1985  SOSA: 20  EGA: 24w4d  Diet controlled gestational diabetes              Via MyChart    Current regimen:  2000 calorie gestational diabetes diet with 3 meals/snacks  Bedtime snack to 1 carbohydrate with 2 protein servings  If a meal causing 2 hour PP>120, decrease 1 CHO and replace with 1 to 2 ounces of protein  Continue testing and walking up to 30 minutes a day if no restrictions from your OBGYN  Self monitoring blood glucose fasting and 2 hours after start of each meal   Insulin pen education previously provided  Plan:  Continue GDM diet and SMBG  Schedule follow up appointment  20 refer to ultrasound report  20 A1c 5 6% and CMP- WNL during pregnancy, reorder week of 3-22-20  Date due to report next:  Monday, 3/16/20 or sooner if BG is above target ranges      BRYSON Burks, CDE, BC-ADM  Diabetes Educator   Diabetes [facility] Pregnancy Program

## 2020-03-12 ENCOUNTER — ROUTINE PRENATAL (OUTPATIENT)
Dept: OBGYN CLINIC | Facility: MEDICAL CENTER | Age: 35
End: 2020-03-12

## 2020-03-12 VITALS — BODY MASS INDEX: 30.68 KG/M2 | WEIGHT: 173.2 LBS | SYSTOLIC BLOOD PRESSURE: 110 MMHG | DIASTOLIC BLOOD PRESSURE: 64 MMHG

## 2020-03-12 DIAGNOSIS — O24.410 DIET CONTROLLED GESTATIONAL DIABETES MELLITUS (GDM) IN SECOND TRIMESTER: Primary | ICD-10-CM

## 2020-03-12 DIAGNOSIS — O34.29 PREGNANCY WITH HISTORY OF UTERINE MYOMECTOMY: ICD-10-CM

## 2020-03-12 PROCEDURE — PNV: Performed by: OBSTETRICS & GYNECOLOGY

## 2020-03-12 NOTE — PROGRESS NOTES
Eloisa Almonte is a 27y o  year old  at 19w9d for routine prenatal visit    + FM, no vaginal bleeding, contractions, or LOF  Complaints: No   Most recent ultrasound and labs reviewed    a1GDM - well controlled   We discussed repeat C/S for  secondary to prior c/s and transfundal myomectomy

## 2020-03-20 ENCOUNTER — DOCUMENTATION (OUTPATIENT)
Dept: PERINATAL CARE | Facility: CLINIC | Age: 35
End: 2020-03-20

## 2020-03-20 NOTE — PROGRESS NOTES
Date:  20  RE: Janett Maguire    : 1985  SOSA: 20  EGA: 25w6d  Diet controlled gestational diabetes              Via My[employer]    Current regimen:  2000 calorie gestational diabetes diet with 3 meals/snacks  Bedtime snack to 1 carbohydrate with 2 protein servings  If a meal causing 2 hour PP>120, decrease 1 CHO and replace with 1 to 2 ounces of protein  Continue testing and walking up to 30 minutes a day if no restrictions from your OBGYN  Self monitoring blood glucose fasting and 2 hours after start of each meal   Insulin pen education previously provided  Plan:  Continue GDM diet and SMBG  Schedule follow up appointment  20 refer to ultrasound report  20 A1c 5 6% and CMP- WNL during pregnancy, reorder week of 3-22-20  Date due to report next:  Monday, 3/23/20 or sooner if BG is above target ranges      BRYSON Tapia, CDE, BC-ADM  Diabetes Educator   Diabetes and Pregnancy Program

## 2020-03-30 ENCOUNTER — TELEPHONE (OUTPATIENT)
Dept: PERINATAL CARE | Facility: CLINIC | Age: 35
End: 2020-03-30

## 2020-03-31 ENCOUNTER — DOCUMENTATION (OUTPATIENT)
Dept: PERINATAL CARE | Facility: CLINIC | Age: 35
End: 2020-03-31

## 2020-03-31 NOTE — PROGRESS NOTES
Date:  20  RE: Gordon Phillips    : 1985  SOSA: 20  EGA: 27w3d  Diet controlled gestational diabetes              Via [employer]  Left voicemail message to discuss initiating insulin therapy  Current regimen:  2000 calorie gestational diabetes diet with 3 meals/snacks  Bedtime snack to 1 carbohydrate with 2 protein servings  If a meal causing 2 hour PP>120, decrease 1 CHO and replace with 1 to 2 ounces of protein  Continue testing and walking up to 30 minutes a day if no restrictions from your OBGYN  Self monitoring blood glucose fasting and 2 hours after start of each meal   Insulin pen education previously provided  Plan:  Due to 7 out of 14 FBG values>90 mg/dl recommended initiating Lantus- 14 units at bedtime 9 or 10 PM daily  Advised patient to contact diabetes program regarding decision to start insulin  Prescriptions would need to be sent to pharmacy for insulin pens and pen needles  Continue GDM diet and SMBG  Suggest decreasing 1 CHO serving (15 gms ) at dinner meal and increase 1 protein serving  20 refer to ultrasound report  20 A1c 5 6% and CMP- WNL during pregnancy, reordered HbA1c today  Requested patient complete blood work this week if possible      Date due to report next:  Requested patient report decision regarding inititating insulin therapy and to report Monday, 20    José Miguel Mariee  Diabetes Educator   Diabetes and Pregnancy Program

## 2020-04-04 ENCOUNTER — NURSE TRIAGE (OUTPATIENT)
Dept: OTHER | Facility: OTHER | Age: 35
End: 2020-04-04

## 2020-04-07 ENCOUNTER — ROUTINE PRENATAL (OUTPATIENT)
Dept: OBGYN CLINIC | Facility: MEDICAL CENTER | Age: 35
End: 2020-04-07
Payer: COMMERCIAL

## 2020-04-07 VITALS — DIASTOLIC BLOOD PRESSURE: 70 MMHG | BODY MASS INDEX: 31 KG/M2 | SYSTOLIC BLOOD PRESSURE: 110 MMHG | WEIGHT: 175 LBS

## 2020-04-07 DIAGNOSIS — O34.219 PREGNANCY WITH HISTORY OF CESAREAN SECTION, ANTEPARTUM: ICD-10-CM

## 2020-04-07 DIAGNOSIS — O24.410 DIET CONTROLLED GESTATIONAL DIABETES MELLITUS (GDM) IN SECOND TRIMESTER: ICD-10-CM

## 2020-04-07 DIAGNOSIS — O26.893 RH NEGATIVE STATUS DURING PREGNANCY IN THIRD TRIMESTER: ICD-10-CM

## 2020-04-07 DIAGNOSIS — O34.29 PREGNANCY WITH HISTORY OF UTERINE MYOMECTOMY: ICD-10-CM

## 2020-04-07 DIAGNOSIS — Z23 NEED FOR DIPHTHERIA-TETANUS-PERTUSSIS (TDAP) VACCINE: ICD-10-CM

## 2020-04-07 DIAGNOSIS — O09.529 ANTEPARTUM MULTIGRAVIDA OF ADVANCED MATERNAL AGE: ICD-10-CM

## 2020-04-07 DIAGNOSIS — Z34.93 THIRD TRIMESTER PREGNANCY: ICD-10-CM

## 2020-04-07 DIAGNOSIS — Z67.91 RH NEGATIVE STATUS DURING PREGNANCY IN THIRD TRIMESTER: ICD-10-CM

## 2020-04-07 DIAGNOSIS — Z3A.28 28 WEEKS GESTATION OF PREGNANCY: Primary | ICD-10-CM

## 2020-04-07 PROCEDURE — 90471 IMMUNIZATION ADMIN: CPT | Performed by: OBSTETRICS & GYNECOLOGY

## 2020-04-07 PROCEDURE — 90715 TDAP VACCINE 7 YRS/> IM: CPT | Performed by: OBSTETRICS & GYNECOLOGY

## 2020-04-07 PROCEDURE — PNV: Performed by: OBSTETRICS & GYNECOLOGY

## 2020-04-09 ENCOUNTER — DOCUMENTATION (OUTPATIENT)
Dept: PERINATAL CARE | Facility: CLINIC | Age: 35
End: 2020-04-09

## 2020-04-13 ENCOUNTER — TELEPHONE (OUTPATIENT)
Dept: PERINATAL CARE | Facility: CLINIC | Age: 35
End: 2020-04-13

## 2020-04-14 ENCOUNTER — TELEPHONE (OUTPATIENT)
Dept: PERINATAL CARE | Facility: CLINIC | Age: 35
End: 2020-04-14

## 2020-04-14 ENCOUNTER — ULTRASOUND (OUTPATIENT)
Dept: PERINATAL CARE | Facility: CLINIC | Age: 35
End: 2020-04-14
Payer: COMMERCIAL

## 2020-04-14 VITALS
WEIGHT: 175.8 LBS | SYSTOLIC BLOOD PRESSURE: 126 MMHG | HEART RATE: 91 BPM | DIASTOLIC BLOOD PRESSURE: 67 MMHG | BODY MASS INDEX: 31.15 KG/M2 | HEIGHT: 63 IN

## 2020-04-14 DIAGNOSIS — O99.810 ABNORMAL GLUCOSE TOLERANCE AFFECTING PREGNANCY, ANTEPARTUM: Primary | ICD-10-CM

## 2020-04-14 DIAGNOSIS — Z3A.29 29 WEEKS GESTATION OF PREGNANCY: ICD-10-CM

## 2020-04-14 DIAGNOSIS — O09.529 ANTEPARTUM MULTIGRAVIDA OF ADVANCED MATERNAL AGE: ICD-10-CM

## 2020-04-14 PROCEDURE — 76816 OB US FOLLOW-UP PER FETUS: CPT | Performed by: OBSTETRICS & GYNECOLOGY

## 2020-04-21 LAB — MISCELLANEOUS LAB TEST RESULT: NORMAL

## 2020-04-23 ENCOUNTER — TELEMEDICINE (OUTPATIENT)
Dept: OBGYN CLINIC | Facility: MEDICAL CENTER | Age: 35
End: 2020-04-23

## 2020-04-23 DIAGNOSIS — O34.29 PREGNANCY WITH HISTORY OF UTERINE MYOMECTOMY: ICD-10-CM

## 2020-04-23 DIAGNOSIS — Z98.890 HISTORY OF MYOMECTOMY: ICD-10-CM

## 2020-04-23 DIAGNOSIS — Z98.891 HISTORY OF CESAREAN SECTION: ICD-10-CM

## 2020-04-23 DIAGNOSIS — O09.529 ANTEPARTUM MULTIGRAVIDA OF ADVANCED MATERNAL AGE: ICD-10-CM

## 2020-04-23 DIAGNOSIS — O24.410 DIET CONTROLLED GESTATIONAL DIABETES MELLITUS (GDM) IN THIRD TRIMESTER: Primary | ICD-10-CM

## 2020-04-23 DIAGNOSIS — Z3A.30 30 WEEKS GESTATION OF PREGNANCY: ICD-10-CM

## 2020-04-23 PROCEDURE — PNV: Performed by: OBSTETRICS & GYNECOLOGY

## 2020-04-29 ENCOUNTER — TELEMEDICINE (OUTPATIENT)
Dept: PERINATAL CARE | Facility: CLINIC | Age: 35
End: 2020-04-29
Payer: COMMERCIAL

## 2020-04-29 VITALS — HEIGHT: 63 IN | BODY MASS INDEX: 31.01 KG/M2 | WEIGHT: 175 LBS

## 2020-04-29 DIAGNOSIS — Z3A.31 31 WEEKS GESTATION OF PREGNANCY: ICD-10-CM

## 2020-04-29 DIAGNOSIS — O24.414 INSULIN CONTROLLED GESTATIONAL DIABETES MELLITUS (GDM) IN THIRD TRIMESTER: Primary | ICD-10-CM

## 2020-04-29 DIAGNOSIS — O99.810 HYPERGLYCEMIA DURING PREGNANCY: ICD-10-CM

## 2020-04-29 PROCEDURE — 99214 OFFICE O/P EST MOD 30 MIN: CPT | Performed by: NURSE PRACTITIONER

## 2020-05-04 ENCOUNTER — ROUTINE PRENATAL (OUTPATIENT)
Dept: OBGYN CLINIC | Facility: MEDICAL CENTER | Age: 35
End: 2020-05-04

## 2020-05-04 ENCOUNTER — DOCUMENTATION (OUTPATIENT)
Dept: PERINATAL CARE | Facility: CLINIC | Age: 35
End: 2020-05-04

## 2020-05-04 VITALS — BODY MASS INDEX: 31.41 KG/M2 | SYSTOLIC BLOOD PRESSURE: 106 MMHG | DIASTOLIC BLOOD PRESSURE: 60 MMHG | WEIGHT: 177.3 LBS

## 2020-05-04 DIAGNOSIS — Z3A.32 32 WEEKS GESTATION OF PREGNANCY: ICD-10-CM

## 2020-05-04 DIAGNOSIS — O24.419 GESTATIONAL DIABETES MELLITUS (GDM) IN FIRST TRIMESTER, GESTATIONAL DIABETES METHOD OF CONTROL UNSPECIFIED: ICD-10-CM

## 2020-05-04 DIAGNOSIS — O24.414 INSULIN CONTROLLED GESTATIONAL DIABETES MELLITUS (GDM) IN THIRD TRIMESTER: Primary | ICD-10-CM

## 2020-05-04 DIAGNOSIS — O99.810 HYPERGLYCEMIA DURING PREGNANCY: ICD-10-CM

## 2020-05-04 DIAGNOSIS — R73.03 PREDIABETES: ICD-10-CM

## 2020-05-04 PROCEDURE — PNV: Performed by: OBSTETRICS & GYNECOLOGY

## 2020-05-04 RX ORDER — DOCUSATE SODIUM 100 MG/1
100 CAPSULE, LIQUID FILLED ORAL AS NEEDED
COMMUNITY

## 2020-05-05 RX ORDER — BLOOD SUGAR DIAGNOSTIC
STRIP MISCELLANEOUS
Qty: 100 EACH | Refills: 2 | Status: SHIPPED | OUTPATIENT
Start: 2020-05-05 | End: 2020-06-10 | Stop reason: HOSPADM

## 2020-05-20 ENCOUNTER — DOCUMENTATION (OUTPATIENT)
Dept: PERINATAL CARE | Facility: CLINIC | Age: 35
End: 2020-05-20

## 2020-05-20 DIAGNOSIS — O99.810 HYPERGLYCEMIA DURING PREGNANCY: ICD-10-CM

## 2020-05-20 DIAGNOSIS — O24.414 INSULIN CONTROLLED GESTATIONAL DIABETES MELLITUS (GDM) IN THIRD TRIMESTER: Primary | ICD-10-CM

## 2020-05-20 DIAGNOSIS — Z3A.34 34 WEEKS GESTATION OF PREGNANCY: ICD-10-CM

## 2020-05-21 ENCOUNTER — ROUTINE PRENATAL (OUTPATIENT)
Dept: OBGYN CLINIC | Facility: MEDICAL CENTER | Age: 35
End: 2020-05-21
Payer: COMMERCIAL

## 2020-05-21 VITALS
SYSTOLIC BLOOD PRESSURE: 118 MMHG | DIASTOLIC BLOOD PRESSURE: 70 MMHG | TEMPERATURE: 98.8 F | WEIGHT: 182.9 LBS | BODY MASS INDEX: 32.4 KG/M2

## 2020-05-21 DIAGNOSIS — O34.29 PREGNANCY WITH HISTORY OF UTERINE MYOMECTOMY: ICD-10-CM

## 2020-05-21 DIAGNOSIS — O24.414 INSULIN CONTROLLED GESTATIONAL DIABETES MELLITUS (GDM) IN THIRD TRIMESTER: Primary | ICD-10-CM

## 2020-05-21 DIAGNOSIS — O34.219 PREGNANCY WITH HISTORY OF CESAREAN SECTION, ANTEPARTUM: ICD-10-CM

## 2020-05-21 DIAGNOSIS — O09.529 ANTEPARTUM MULTIGRAVIDA OF ADVANCED MATERNAL AGE: ICD-10-CM

## 2020-05-21 PROCEDURE — PNV: Performed by: OBSTETRICS & GYNECOLOGY

## 2020-05-21 PROCEDURE — 59025 FETAL NON-STRESS TEST: CPT | Performed by: OBSTETRICS & GYNECOLOGY

## 2020-05-22 PROBLEM — Z3A.34 34 WEEKS GESTATION OF PREGNANCY: Status: ACTIVE | Noted: 2019-12-23

## 2020-05-23 ENCOUNTER — APPOINTMENT (OUTPATIENT)
Dept: LAB | Facility: HOSPITAL | Age: 35
End: 2020-05-23
Attending: OBSTETRICS & GYNECOLOGY
Payer: COMMERCIAL

## 2020-05-23 DIAGNOSIS — Z3A.30 30 WEEKS GESTATION OF PREGNANCY: ICD-10-CM

## 2020-05-23 LAB
BASOPHILS # BLD AUTO: 0.01 THOUSANDS/ΜL (ref 0–0.1)
BASOPHILS NFR BLD AUTO: 0 % (ref 0–1)
EOSINOPHIL # BLD AUTO: 0.04 THOUSAND/ΜL (ref 0–0.61)
EOSINOPHIL NFR BLD AUTO: 1 % (ref 0–6)
ERYTHROCYTE [DISTWIDTH] IN BLOOD BY AUTOMATED COUNT: 13.7 % (ref 11.6–15.1)
EST. AVERAGE GLUCOSE BLD GHB EST-MCNC: 111 MG/DL
HBA1C MFR BLD: 5.5 %
HCT VFR BLD AUTO: 34.7 % (ref 34.8–46.1)
HGB BLD-MCNC: 11.2 G/DL (ref 11.5–15.4)
IMM GRANULOCYTES # BLD AUTO: 0.14 THOUSAND/UL (ref 0–0.2)
IMM GRANULOCYTES NFR BLD AUTO: 2 % (ref 0–2)
LYMPHOCYTES # BLD AUTO: 1.76 THOUSANDS/ΜL (ref 0.6–4.47)
LYMPHOCYTES NFR BLD AUTO: 25 % (ref 14–44)
MCH RBC QN AUTO: 30.9 PG (ref 26.8–34.3)
MCHC RBC AUTO-ENTMCNC: 32.3 G/DL (ref 31.4–37.4)
MCV RBC AUTO: 96 FL (ref 82–98)
MONOCYTES # BLD AUTO: 0.47 THOUSAND/ΜL (ref 0.17–1.22)
MONOCYTES NFR BLD AUTO: 7 % (ref 4–12)
NEUTROPHILS # BLD AUTO: 4.5 THOUSANDS/ΜL (ref 1.85–7.62)
NEUTS SEG NFR BLD AUTO: 65 % (ref 43–75)
NRBC BLD AUTO-RTO: 0 /100 WBCS
PLATELET # BLD AUTO: 262 THOUSANDS/UL (ref 149–390)
PMV BLD AUTO: 10 FL (ref 8.9–12.7)
RBC # BLD AUTO: 3.62 MILLION/UL (ref 3.81–5.12)
WBC # BLD AUTO: 6.92 THOUSAND/UL (ref 4.31–10.16)

## 2020-05-23 PROCEDURE — 85025 COMPLETE CBC W/AUTO DIFF WBC: CPT

## 2020-05-23 PROCEDURE — 36415 COLL VENOUS BLD VENIPUNCTURE: CPT

## 2020-05-23 PROCEDURE — 83036 HEMOGLOBIN GLYCOSYLATED A1C: CPT

## 2020-05-28 ENCOUNTER — TELEPHONE (OUTPATIENT)
Dept: PERINATAL CARE | Facility: CLINIC | Age: 35
End: 2020-05-28

## 2020-05-28 ENCOUNTER — TELEPHONE (OUTPATIENT)
Dept: OBGYN CLINIC | Facility: MEDICAL CENTER | Age: 35
End: 2020-05-28

## 2020-05-29 ENCOUNTER — ROUTINE PRENATAL (OUTPATIENT)
Dept: PERINATAL CARE | Facility: CLINIC | Age: 35
End: 2020-05-29
Payer: COMMERCIAL

## 2020-05-29 ENCOUNTER — DOCUMENTATION (OUTPATIENT)
Dept: PERINATAL CARE | Facility: CLINIC | Age: 35
End: 2020-05-29

## 2020-05-29 ENCOUNTER — ULTRASOUND (OUTPATIENT)
Dept: PERINATAL CARE | Facility: CLINIC | Age: 35
End: 2020-05-29
Payer: COMMERCIAL

## 2020-05-29 ENCOUNTER — TELEPHONE (OUTPATIENT)
Dept: OBGYN CLINIC | Facility: MEDICAL CENTER | Age: 35
End: 2020-05-29

## 2020-05-29 ENCOUNTER — TELEPHONE (OUTPATIENT)
Dept: PERINATAL CARE | Facility: CLINIC | Age: 35
End: 2020-05-29

## 2020-05-29 VITALS
HEART RATE: 93 BPM | TEMPERATURE: 97.7 F | HEIGHT: 63 IN | SYSTOLIC BLOOD PRESSURE: 113 MMHG | WEIGHT: 182 LBS | DIASTOLIC BLOOD PRESSURE: 58 MMHG | BODY MASS INDEX: 32.25 KG/M2

## 2020-05-29 DIAGNOSIS — O24.414 INSULIN CONTROLLED GESTATIONAL DIABETES MELLITUS (GDM) IN THIRD TRIMESTER: Primary | ICD-10-CM

## 2020-05-29 DIAGNOSIS — Z36.89 ENCOUNTER FOR ULTRASOUND TO ASSESS FETAL GROWTH: ICD-10-CM

## 2020-05-29 DIAGNOSIS — O99.810 HYPERGLYCEMIA DURING PREGNANCY: ICD-10-CM

## 2020-05-29 DIAGNOSIS — O24.414 INSULIN CONTROLLED GESTATIONAL DIABETES MELLITUS (GDM) IN THIRD TRIMESTER: ICD-10-CM

## 2020-05-29 DIAGNOSIS — Z3A.35 35 WEEKS GESTATION OF PREGNANCY: ICD-10-CM

## 2020-05-29 DIAGNOSIS — Z3A.35 35 WEEKS GESTATION OF PREGNANCY: Primary | ICD-10-CM

## 2020-05-29 DIAGNOSIS — Z3A.36 36 WEEKS GESTATION OF PREGNANCY: Primary | ICD-10-CM

## 2020-05-29 PROBLEM — Z98.890 HISTORY OF MYOMECTOMY: Status: RESOLVED | Noted: 2018-06-28 | Resolved: 2020-05-29

## 2020-05-29 PROCEDURE — 76816 OB US FOLLOW-UP PER FETUS: CPT | Performed by: OBSTETRICS & GYNECOLOGY

## 2020-05-29 PROCEDURE — 59025 FETAL NON-STRESS TEST: CPT | Performed by: OBSTETRICS & GYNECOLOGY

## 2020-05-29 PROCEDURE — NC001 PR NO CHARGE

## 2020-06-02 ENCOUNTER — ROUTINE PRENATAL (OUTPATIENT)
Dept: OBGYN CLINIC | Facility: MEDICAL CENTER | Age: 35
End: 2020-06-02

## 2020-06-02 VITALS — DIASTOLIC BLOOD PRESSURE: 66 MMHG | SYSTOLIC BLOOD PRESSURE: 112 MMHG | WEIGHT: 182 LBS | BODY MASS INDEX: 32.24 KG/M2

## 2020-06-02 DIAGNOSIS — O99.810 HYPERGLYCEMIA DURING PREGNANCY: ICD-10-CM

## 2020-06-02 DIAGNOSIS — Z3A.35 35 WEEKS GESTATION OF PREGNANCY: ICD-10-CM

## 2020-06-02 DIAGNOSIS — O24.414 INSULIN CONTROLLED GESTATIONAL DIABETES MELLITUS (GDM) IN THIRD TRIMESTER: Primary | ICD-10-CM

## 2020-06-02 DIAGNOSIS — Z3A.36 36 WEEKS GESTATION OF PREGNANCY: ICD-10-CM

## 2020-06-02 DIAGNOSIS — R73.03 PREDIABETES: ICD-10-CM

## 2020-06-02 PROCEDURE — PNV: Performed by: OBSTETRICS & GYNECOLOGY

## 2020-06-02 PROCEDURE — 87653 STREP B DNA AMP PROBE: CPT | Performed by: OBSTETRICS & GYNECOLOGY

## 2020-06-03 ENCOUNTER — TELEPHONE (OUTPATIENT)
Dept: OBGYN CLINIC | Facility: MEDICAL CENTER | Age: 35
End: 2020-06-03

## 2020-06-03 ENCOUNTER — DOCUMENTATION (OUTPATIENT)
Dept: PERINATAL CARE | Facility: CLINIC | Age: 35
End: 2020-06-03

## 2020-06-04 ENCOUNTER — TELEPHONE (OUTPATIENT)
Dept: PERINATAL CARE | Facility: CLINIC | Age: 35
End: 2020-06-04

## 2020-06-04 LAB — GP B STREP DNA SPEC QL NAA+PROBE: ABNORMAL

## 2020-06-05 ENCOUNTER — ROUTINE PRENATAL (OUTPATIENT)
Dept: PERINATAL CARE | Facility: CLINIC | Age: 35
End: 2020-06-05
Payer: COMMERCIAL

## 2020-06-05 VITALS
WEIGHT: 183 LBS | HEIGHT: 63 IN | HEART RATE: 103 BPM | BODY MASS INDEX: 32.43 KG/M2 | TEMPERATURE: 98.2 F | SYSTOLIC BLOOD PRESSURE: 119 MMHG | DIASTOLIC BLOOD PRESSURE: 69 MMHG

## 2020-06-05 DIAGNOSIS — Z3A.36 36 WEEKS GESTATION OF PREGNANCY: Primary | ICD-10-CM

## 2020-06-05 DIAGNOSIS — O24.414 INSULIN CONTROLLED GESTATIONAL DIABETES MELLITUS (GDM) IN THIRD TRIMESTER: ICD-10-CM

## 2020-06-05 PROCEDURE — 59025 FETAL NON-STRESS TEST: CPT | Performed by: OBSTETRICS & GYNECOLOGY

## 2020-06-08 ENCOUNTER — HOSPITAL ENCOUNTER (INPATIENT)
Facility: HOSPITAL | Age: 35
LOS: 2 days | Discharge: HOME | End: 2020-06-10
Attending: OBSTETRICS & GYNECOLOGY | Admitting: OBSTETRICS & GYNECOLOGY
Payer: COMMERCIAL

## 2020-06-08 ENCOUNTER — DOCUMENTATION (OUTPATIENT)
Dept: PERINATAL CARE | Facility: CLINIC | Age: 35
End: 2020-06-08

## 2020-06-08 ENCOUNTER — ANESTHESIA EVENT (INPATIENT)
Dept: LABOR AND DELIVERY | Facility: HOSPITAL | Age: 35
End: 2020-06-08
Payer: COMMERCIAL

## 2020-06-08 ENCOUNTER — ANESTHESIA (INPATIENT)
Dept: LABOR AND DELIVERY | Facility: HOSPITAL | Age: 35
End: 2020-06-08
Payer: COMMERCIAL

## 2020-06-08 DIAGNOSIS — Z98.891 STATUS POST REPEAT LOW TRANSVERSE CESAREAN SECTION: ICD-10-CM

## 2020-06-08 DIAGNOSIS — Z3A.37 37 WEEKS GESTATION OF PREGNANCY: Primary | ICD-10-CM

## 2020-06-08 LAB
ABO GROUP BLD: NORMAL
BASE EXCESS BLDCOA CALC-SCNC: -4.5 MMOL/L (ref 3–11)
BASE EXCESS BLDCOV CALC-SCNC: -6 MMOL/L (ref 1–9)
BASOPHILS # BLD AUTO: 0.01 THOUSANDS/ΜL (ref 0–0.1)
BASOPHILS NFR BLD AUTO: 0 % (ref 0–1)
BLD GP AB SCN SERPL QL: NEGATIVE
EOSINOPHIL # BLD AUTO: 0.04 THOUSAND/ΜL (ref 0–0.61)
EOSINOPHIL NFR BLD AUTO: 1 % (ref 0–6)
ERYTHROCYTE [DISTWIDTH] IN BLOOD BY AUTOMATED COUNT: 13.8 % (ref 11.6–15.1)
GLUCOSE SERPL-MCNC: 79 MG/DL (ref 65–140)
HCO3 BLDCOA-SCNC: 23.9 MMOL/L (ref 17.3–27.3)
HCO3 BLDCOV-SCNC: 19.1 MMOL/L (ref 12.2–28.6)
HCT VFR BLD AUTO: 34 % (ref 34.8–46.1)
HGB BLD-MCNC: 11.3 G/DL (ref 11.5–15.4)
IMM GRANULOCYTES # BLD AUTO: 0.08 THOUSAND/UL (ref 0–0.2)
IMM GRANULOCYTES NFR BLD AUTO: 1 % (ref 0–2)
LYMPHOCYTES # BLD AUTO: 2.29 THOUSANDS/ΜL (ref 0.6–4.47)
LYMPHOCYTES NFR BLD AUTO: 28 % (ref 14–44)
MCH RBC QN AUTO: 31.5 PG (ref 26.8–34.3)
MCHC RBC AUTO-ENTMCNC: 33.2 G/DL (ref 31.4–37.4)
MCV RBC AUTO: 95 FL (ref 82–98)
MONOCYTES # BLD AUTO: 0.72 THOUSAND/ΜL (ref 0.17–1.22)
MONOCYTES NFR BLD AUTO: 9 % (ref 4–12)
NEUTROPHILS # BLD AUTO: 5.01 THOUSANDS/ΜL (ref 1.85–7.62)
NEUTS SEG NFR BLD AUTO: 61 % (ref 43–75)
NRBC BLD AUTO-RTO: 0 /100 WBCS
O2 CT VFR BLDCOA CALC: 5.9 ML/DL
OXYHGB MFR BLDCOA: 28.4 %
OXYHGB MFR BLDCOV: 74.5 %
PCO2 BLDCOA: 57.2 MM[HG] (ref 30–60)
PCO2 BLDCOV: 36.5 MM HG (ref 27–43)
PH BLDCOA: 7.24 [PH] (ref 7.23–7.43)
PH BLDCOV: 7.34 [PH] (ref 7.19–7.49)
PLATELET # BLD AUTO: 252 THOUSANDS/UL (ref 149–390)
PMV BLD AUTO: 10 FL (ref 8.9–12.7)
PO2 BLDCOA: 16.9 MM HG (ref 5–25)
PO2 BLDCOV: 33.5 MM HG (ref 15–45)
RBC # BLD AUTO: 3.59 MILLION/UL (ref 3.81–5.12)
RH BLD: NEGATIVE
SAO2 % BLDCOV: 15.6 ML/DL
SPECIMEN EXPIRATION DATE: NORMAL
WBC # BLD AUTO: 8.15 THOUSAND/UL (ref 4.31–10.16)

## 2020-06-08 PROCEDURE — 99024 POSTOP FOLLOW-UP VISIT: CPT | Performed by: OBSTETRICS & GYNECOLOGY

## 2020-06-08 PROCEDURE — 94762 N-INVAS EAR/PLS OXIMTRY CONT: CPT

## 2020-06-08 PROCEDURE — 86850 RBC ANTIBODY SCREEN: CPT | Performed by: OBSTETRICS & GYNECOLOGY

## 2020-06-08 PROCEDURE — 86592 SYPHILIS TEST NON-TREP QUAL: CPT | Performed by: OBSTETRICS & GYNECOLOGY

## 2020-06-08 PROCEDURE — 86901 BLOOD TYPING SEROLOGIC RH(D): CPT | Performed by: OBSTETRICS & GYNECOLOGY

## 2020-06-08 PROCEDURE — 85025 COMPLETE CBC W/AUTO DIFF WBC: CPT | Performed by: OBSTETRICS & GYNECOLOGY

## 2020-06-08 PROCEDURE — 59510 CESAREAN DELIVERY: CPT | Performed by: OBSTETRICS & GYNECOLOGY

## 2020-06-08 PROCEDURE — 82805 BLOOD GASES W/O2 SATURATION: CPT | Performed by: OBSTETRICS & GYNECOLOGY

## 2020-06-08 PROCEDURE — 86900 BLOOD TYPING SEROLOGIC ABO: CPT | Performed by: OBSTETRICS & GYNECOLOGY

## 2020-06-08 PROCEDURE — 82948 REAGENT STRIP/BLOOD GLUCOSE: CPT

## 2020-06-08 RX ORDER — PHENYLEPHRINE HCL IN 0.9% NACL 1 MG/10 ML
SYRINGE (ML) INTRAVENOUS
Status: DISPENSED
Start: 2020-06-08 | End: 2020-06-08

## 2020-06-08 RX ORDER — DOCUSATE SODIUM 100 MG/1
100 CAPSULE, LIQUID FILLED ORAL 2 TIMES DAILY
Status: DISCONTINUED | OUTPATIENT
Start: 2020-06-08 | End: 2020-06-10 | Stop reason: HOSPADM

## 2020-06-08 RX ORDER — DEXAMETHASONE SODIUM PHOSPHATE 4 MG/ML
8 INJECTION, SOLUTION INTRA-ARTICULAR; INTRALESIONAL; INTRAMUSCULAR; INTRAVENOUS; SOFT TISSUE ONCE AS NEEDED
Status: ACTIVE | OUTPATIENT
Start: 2020-06-08 | End: 2020-06-09

## 2020-06-08 RX ORDER — OXYCODONE HYDROCHLORIDE 10 MG/1
10 TABLET ORAL EVERY 4 HOURS PRN
Status: DISCONTINUED | OUTPATIENT
Start: 2020-06-09 | End: 2020-06-10 | Stop reason: HOSPADM

## 2020-06-08 RX ORDER — ONDANSETRON 2 MG/ML
4 INJECTION INTRAMUSCULAR; INTRAVENOUS EVERY 4 HOURS PRN
Status: ACTIVE | OUTPATIENT
Start: 2020-06-08 | End: 2020-06-09

## 2020-06-08 RX ORDER — CALCIUM CARBONATE 200(500)MG
1000 TABLET,CHEWABLE ORAL DAILY PRN
Status: DISCONTINUED | OUTPATIENT
Start: 2020-06-08 | End: 2020-06-10 | Stop reason: HOSPADM

## 2020-06-08 RX ORDER — ACETAMINOPHEN 325 MG/1
650 TABLET ORAL EVERY 6 HOURS
Status: DISCONTINUED | OUTPATIENT
Start: 2020-06-08 | End: 2020-06-10 | Stop reason: HOSPADM

## 2020-06-08 RX ORDER — MORPHINE SULFATE 0.5 MG/ML
INJECTION, SOLUTION EPIDURAL; INTRATHECAL; INTRAVENOUS AS NEEDED
Status: DISCONTINUED | OUTPATIENT
Start: 2020-06-08 | End: 2020-06-08 | Stop reason: SURG

## 2020-06-08 RX ORDER — METOCLOPRAMIDE HYDROCHLORIDE 5 MG/ML
INJECTION INTRAMUSCULAR; INTRAVENOUS AS NEEDED
Status: DISCONTINUED | OUTPATIENT
Start: 2020-06-08 | End: 2020-06-08 | Stop reason: SURG

## 2020-06-08 RX ORDER — SIMETHICONE 80 MG
80 TABLET,CHEWABLE ORAL 4 TIMES DAILY PRN
Status: DISCONTINUED | OUTPATIENT
Start: 2020-06-08 | End: 2020-06-10 | Stop reason: HOSPADM

## 2020-06-08 RX ORDER — DIAPER,BRIEF,INFANT-TODD,DISP
1 EACH MISCELLANEOUS DAILY PRN
Status: DISCONTINUED | OUTPATIENT
Start: 2020-06-08 | End: 2020-06-10 | Stop reason: HOSPADM

## 2020-06-08 RX ORDER — OXYCODONE HYDROCHLORIDE 5 MG/1
5 TABLET ORAL EVERY 4 HOURS PRN
Status: ACTIVE | OUTPATIENT
Start: 2020-06-08 | End: 2020-06-09

## 2020-06-08 RX ORDER — BUPIVACAINE HYDROCHLORIDE 7.5 MG/ML
INJECTION, SOLUTION INTRASPINAL AS NEEDED
Status: DISCONTINUED | OUTPATIENT
Start: 2020-06-08 | End: 2020-06-08 | Stop reason: SURG

## 2020-06-08 RX ORDER — METOCLOPRAMIDE HYDROCHLORIDE 5 MG/ML
5 INJECTION INTRAMUSCULAR; INTRAVENOUS EVERY 6 HOURS PRN
Status: ACTIVE | OUTPATIENT
Start: 2020-06-08 | End: 2020-06-09

## 2020-06-08 RX ORDER — OXYTOCIN/RINGER'S LACTATE 30/500 ML
62.5 PLASTIC BAG, INJECTION (ML) INTRAVENOUS CONTINUOUS
Status: DISCONTINUED | OUTPATIENT
Start: 2020-06-08 | End: 2020-06-08 | Stop reason: SDUPTHER

## 2020-06-08 RX ORDER — MORPHINE SULFATE 0.5 MG/ML
INJECTION, SOLUTION EPIDURAL; INTRATHECAL; INTRAVENOUS
Status: COMPLETED
Start: 2020-06-08 | End: 2020-06-08

## 2020-06-08 RX ORDER — SODIUM CHLORIDE 9 MG/ML
INJECTION, SOLUTION INTRAVENOUS CONTINUOUS PRN
Status: DISCONTINUED | OUTPATIENT
Start: 2020-06-08 | End: 2020-06-08 | Stop reason: SURG

## 2020-06-08 RX ORDER — FENTANYL CITRATE/PF 50 MCG/ML
25 SYRINGE (ML) INJECTION
Status: DISCONTINUED | OUTPATIENT
Start: 2020-06-08 | End: 2020-06-10 | Stop reason: HOSPADM

## 2020-06-08 RX ORDER — OXYTOCIN/RINGER'S LACTATE 30/500 ML
62.5 PLASTIC BAG, INJECTION (ML) INTRAVENOUS
Status: DISCONTINUED | OUTPATIENT
Start: 2020-06-08 | End: 2020-06-10 | Stop reason: HOSPADM

## 2020-06-08 RX ORDER — ACETAMINOPHEN 325 MG/1
650 TABLET ORAL EVERY 6 HOURS PRN
Status: DISCONTINUED | OUTPATIENT
Start: 2020-06-08 | End: 2020-06-10 | Stop reason: HOSPADM

## 2020-06-08 RX ORDER — OXYCODONE HYDROCHLORIDE 5 MG/1
5 TABLET ORAL EVERY 4 HOURS PRN
Status: DISCONTINUED | OUTPATIENT
Start: 2020-06-09 | End: 2020-06-10 | Stop reason: HOSPADM

## 2020-06-08 RX ORDER — DIPHENHYDRAMINE HYDROCHLORIDE 50 MG/ML
25 INJECTION INTRAMUSCULAR; INTRAVENOUS EVERY 6 HOURS PRN
Status: DISCONTINUED | OUTPATIENT
Start: 2020-06-09 | End: 2020-06-09

## 2020-06-08 RX ORDER — SODIUM CHLORIDE 9 MG/ML
125 INJECTION, SOLUTION INTRAVENOUS CONTINUOUS
Status: DISCONTINUED | OUTPATIENT
Start: 2020-06-08 | End: 2020-06-10 | Stop reason: HOSPADM

## 2020-06-08 RX ORDER — KETOROLAC TROMETHAMINE 30 MG/ML
INJECTION, SOLUTION INTRAMUSCULAR; INTRAVENOUS AS NEEDED
Status: DISCONTINUED | OUTPATIENT
Start: 2020-06-08 | End: 2020-06-08 | Stop reason: SURG

## 2020-06-08 RX ORDER — DIPHENHYDRAMINE HYDROCHLORIDE 50 MG/ML
25 INJECTION INTRAMUSCULAR; INTRAVENOUS EVERY 6 HOURS PRN
Status: ACTIVE | OUTPATIENT
Start: 2020-06-08 | End: 2020-06-09

## 2020-06-08 RX ORDER — ALBUTEROL SULFATE 2.5 MG/3ML
2.5 SOLUTION RESPIRATORY (INHALATION) ONCE AS NEEDED
Status: DISCONTINUED | OUTPATIENT
Start: 2020-06-08 | End: 2020-06-10 | Stop reason: HOSPADM

## 2020-06-08 RX ORDER — CEFAZOLIN SODIUM 2 G/50ML
SOLUTION INTRAVENOUS AS NEEDED
Status: DISCONTINUED | OUTPATIENT
Start: 2020-06-08 | End: 2020-06-08 | Stop reason: SURG

## 2020-06-08 RX ORDER — OXYTOCIN/RINGER'S LACTATE 30/500 ML
PLASTIC BAG, INJECTION (ML) INTRAVENOUS CONTINUOUS PRN
Status: DISCONTINUED | OUTPATIENT
Start: 2020-06-08 | End: 2020-06-08 | Stop reason: SURG

## 2020-06-08 RX ORDER — NALOXONE HYDROCHLORIDE 0.4 MG/ML
0.1 INJECTION, SOLUTION INTRAMUSCULAR; INTRAVENOUS; SUBCUTANEOUS
Status: ACTIVE | OUTPATIENT
Start: 2020-06-08 | End: 2020-06-09

## 2020-06-08 RX ORDER — KETOROLAC TROMETHAMINE 30 MG/ML
30 INJECTION, SOLUTION INTRAMUSCULAR; INTRAVENOUS EVERY 6 HOURS
Status: DISPENSED | OUTPATIENT
Start: 2020-06-08 | End: 2020-06-09

## 2020-06-08 RX ORDER — ONDANSETRON 2 MG/ML
INJECTION INTRAMUSCULAR; INTRAVENOUS AS NEEDED
Status: DISCONTINUED | OUTPATIENT
Start: 2020-06-08 | End: 2020-06-08 | Stop reason: SURG

## 2020-06-08 RX ORDER — ONDANSETRON 2 MG/ML
4 INJECTION INTRAMUSCULAR; INTRAVENOUS EVERY 8 HOURS PRN
Status: DISCONTINUED | OUTPATIENT
Start: 2020-06-09 | End: 2020-06-10 | Stop reason: HOSPADM

## 2020-06-08 RX ORDER — ONDANSETRON 2 MG/ML
4 INJECTION INTRAMUSCULAR; INTRAVENOUS EVERY 8 HOURS PRN
Status: DISCONTINUED | OUTPATIENT
Start: 2020-06-08 | End: 2020-06-08

## 2020-06-08 RX ORDER — IBUPROFEN 600 MG/1
600 TABLET ORAL EVERY 6 HOURS PRN
Status: DISCONTINUED | OUTPATIENT
Start: 2020-06-08 | End: 2020-06-10 | Stop reason: HOSPADM

## 2020-06-08 RX ORDER — CEFAZOLIN SODIUM 2 G/50ML
2000 SOLUTION INTRAVENOUS ONCE
Status: DISCONTINUED | OUTPATIENT
Start: 2020-06-08 | End: 2020-06-08

## 2020-06-08 RX ADMIN — SODIUM CHLORIDE 125 ML/HR: 0.9 INJECTION, SOLUTION INTRAVENOUS at 22:02

## 2020-06-08 RX ADMIN — SODIUM CHLORIDE: 0.9 INJECTION, SOLUTION INTRAVENOUS at 09:52

## 2020-06-08 RX ADMIN — KETOROLAC TROMETHAMINE 30 MG: 30 INJECTION, SOLUTION INTRAMUSCULAR at 18:40

## 2020-06-08 RX ADMIN — ACETAMINOPHEN 650 MG: 325 TABLET ORAL at 14:06

## 2020-06-08 RX ADMIN — BUPIVACAINE HYDROCHLORIDE IN DEXTROSE 1.8 ML: 7.5 INJECTION, SOLUTION SUBARACHNOID at 09:57

## 2020-06-08 RX ADMIN — KETOROLAC TROMETHAMINE 30 MG: 30 INJECTION, SOLUTION INTRAMUSCULAR at 10:50

## 2020-06-08 RX ADMIN — CEFAZOLIN SODIUM 2000 MG: 2 SOLUTION INTRAVENOUS at 09:50

## 2020-06-08 RX ADMIN — Medication 250 MILLI-UNITS/MIN: at 10:21

## 2020-06-08 RX ADMIN — ONDANSETRON 4 MG: 2 INJECTION INTRAMUSCULAR; INTRAVENOUS at 09:54

## 2020-06-08 RX ADMIN — MORPHINE SULFATE 0.1 MG: 0.5 INJECTION, SOLUTION EPIDURAL; INTRATHECAL; INTRAVENOUS at 09:57

## 2020-06-08 RX ADMIN — METOCLOPRAMIDE 10 MG: 5 INJECTION, SOLUTION INTRAMUSCULAR; INTRAVENOUS at 09:54

## 2020-06-08 RX ADMIN — DOCUSATE SODIUM 100 MG: 100 CAPSULE, LIQUID FILLED ORAL at 18:41

## 2020-06-08 RX ADMIN — SODIUM CHLORIDE 1000 ML: 0.9 INJECTION, SOLUTION INTRAVENOUS at 08:00

## 2020-06-08 RX ADMIN — SODIUM CHLORIDE: 0.9 INJECTION, SOLUTION INTRAVENOUS at 10:18

## 2020-06-08 RX ADMIN — PHENYLEPHRINE HYDROCHLORIDE 100 MCG: 10 INJECTION INTRAVENOUS at 10:13

## 2020-06-08 RX ADMIN — CEFAZOLIN SODIUM 2000 MG: 2 SOLUTION INTRAVENOUS at 09:55

## 2020-06-09 LAB
ABO GROUP BLD: NORMAL
BASOPHILS # BLD AUTO: 0.01 THOUSANDS/ΜL (ref 0–0.1)
BASOPHILS NFR BLD AUTO: 0 % (ref 0–1)
BLD GP AB SCN SERPL QL: NEGATIVE
EOSINOPHIL # BLD AUTO: 0.05 THOUSAND/ΜL (ref 0–0.61)
EOSINOPHIL NFR BLD AUTO: 1 % (ref 0–6)
ERYTHROCYTE [DISTWIDTH] IN BLOOD BY AUTOMATED COUNT: 13.9 % (ref 11.6–15.1)
FETAL CELL SCN BLD QL ROSETTE: NEGATIVE
HCT VFR BLD AUTO: 29.4 % (ref 34.8–46.1)
HGB BLD-MCNC: 9.4 G/DL (ref 11.5–15.4)
IMM GRANULOCYTES # BLD AUTO: 0.03 THOUSAND/UL (ref 0–0.2)
IMM GRANULOCYTES NFR BLD AUTO: 0 % (ref 0–2)
LYMPHOCYTES # BLD AUTO: 1.91 THOUSANDS/ΜL (ref 0.6–4.47)
LYMPHOCYTES NFR BLD AUTO: 24 % (ref 14–44)
MCH RBC QN AUTO: 31.2 PG (ref 26.8–34.3)
MCHC RBC AUTO-ENTMCNC: 32 G/DL (ref 31.4–37.4)
MCV RBC AUTO: 98 FL (ref 82–98)
MONOCYTES # BLD AUTO: 0.67 THOUSAND/ΜL (ref 0.17–1.22)
MONOCYTES NFR BLD AUTO: 9 % (ref 4–12)
NEUTROPHILS # BLD AUTO: 5.15 THOUSANDS/ΜL (ref 1.85–7.62)
NEUTS SEG NFR BLD AUTO: 66 % (ref 43–75)
NRBC BLD AUTO-RTO: 0 /100 WBCS
PLATELET # BLD AUTO: 167 THOUSANDS/UL (ref 149–390)
PMV BLD AUTO: 9.8 FL (ref 8.9–12.7)
RBC # BLD AUTO: 3.01 MILLION/UL (ref 3.81–5.12)
RH BLD: NEGATIVE
RPR SER QL: NORMAL
WBC # BLD AUTO: 7.82 THOUSAND/UL (ref 4.31–10.16)

## 2020-06-09 PROCEDURE — 86850 RBC ANTIBODY SCREEN: CPT | Performed by: OBSTETRICS & GYNECOLOGY

## 2020-06-09 PROCEDURE — 85025 COMPLETE CBC W/AUTO DIFF WBC: CPT | Performed by: OBSTETRICS & GYNECOLOGY

## 2020-06-09 PROCEDURE — 85461 HEMOGLOBIN FETAL: CPT | Performed by: OBSTETRICS & GYNECOLOGY

## 2020-06-09 PROCEDURE — 86901 BLOOD TYPING SEROLOGIC RH(D): CPT | Performed by: OBSTETRICS & GYNECOLOGY

## 2020-06-09 PROCEDURE — 86900 BLOOD TYPING SEROLOGIC ABO: CPT | Performed by: OBSTETRICS & GYNECOLOGY

## 2020-06-09 PROCEDURE — 99024 POSTOP FOLLOW-UP VISIT: CPT | Performed by: OBSTETRICS & GYNECOLOGY

## 2020-06-09 RX ORDER — DIPHENHYDRAMINE HCL 25 MG
25 TABLET ORAL EVERY 6 HOURS PRN
Status: DISCONTINUED | OUTPATIENT
Start: 2020-06-09 | End: 2020-06-10 | Stop reason: HOSPADM

## 2020-06-09 RX ADMIN — KETOROLAC TROMETHAMINE 30 MG: 30 INJECTION, SOLUTION INTRAMUSCULAR at 00:10

## 2020-06-09 RX ADMIN — IBUPROFEN 600 MG: 600 TABLET ORAL at 14:00

## 2020-06-09 RX ADMIN — ACETAMINOPHEN 650 MG: 325 TABLET ORAL at 06:25

## 2020-06-09 RX ADMIN — ACETAMINOPHEN 650 MG: 325 TABLET ORAL at 17:20

## 2020-06-09 RX ADMIN — IBUPROFEN 600 MG: 600 TABLET ORAL at 20:49

## 2020-06-09 RX ADMIN — KETOROLAC TROMETHAMINE 30 MG: 30 INJECTION, SOLUTION INTRAMUSCULAR at 06:25

## 2020-06-09 RX ADMIN — DOCUSATE SODIUM 100 MG: 100 CAPSULE, LIQUID FILLED ORAL at 09:26

## 2020-06-09 RX ADMIN — OXYCODONE HYDROCHLORIDE 10 MG: 10 TABLET ORAL at 23:29

## 2020-06-09 RX ADMIN — HUMAN RHO(D) IMMUNE GLOBULIN 300 MCG: 300 INJECTION, SOLUTION INTRAMUSCULAR at 13:55

## 2020-06-09 RX ADMIN — DOCUSATE SODIUM 100 MG: 100 CAPSULE, LIQUID FILLED ORAL at 17:20

## 2020-06-09 RX ADMIN — ACETAMINOPHEN 650 MG: 325 TABLET ORAL at 11:27

## 2020-06-10 VITALS
SYSTOLIC BLOOD PRESSURE: 121 MMHG | OXYGEN SATURATION: 98 % | WEIGHT: 181.88 LBS | DIASTOLIC BLOOD PRESSURE: 77 MMHG | TEMPERATURE: 98.1 F | HEART RATE: 73 BPM | RESPIRATION RATE: 18 BRPM | BODY MASS INDEX: 33.47 KG/M2 | HEIGHT: 62 IN

## 2020-06-10 PROCEDURE — 99024 POSTOP FOLLOW-UP VISIT: CPT | Performed by: OBSTETRICS & GYNECOLOGY

## 2020-06-10 RX ORDER — OXYCODONE HYDROCHLORIDE 5 MG/1
5 TABLET ORAL EVERY 4 HOURS PRN
Qty: 15 TABLET | Refills: 0 | Status: SHIPPED | OUTPATIENT
Start: 2020-06-10 | End: 2020-06-20

## 2020-06-10 RX ORDER — IBUPROFEN 600 MG/1
600 TABLET ORAL EVERY 6 HOURS PRN
Qty: 20 TABLET | Refills: 0 | Status: ON HOLD | OUTPATIENT
Start: 2020-06-10 | End: 2020-06-20 | Stop reason: SDUPTHER

## 2020-06-10 RX ORDER — ACETAMINOPHEN 325 MG/1
650 TABLET ORAL EVERY 6 HOURS
Qty: 30 TABLET | Refills: 0 | Status: SHIPPED | OUTPATIENT
Start: 2020-06-10

## 2020-06-10 RX ADMIN — ACETAMINOPHEN 650 MG: 325 TABLET ORAL at 12:48

## 2020-06-10 RX ADMIN — IBUPROFEN 600 MG: 600 TABLET ORAL at 04:25

## 2020-06-10 RX ADMIN — ACETAMINOPHEN 650 MG: 325 TABLET ORAL at 06:46

## 2020-06-10 RX ADMIN — DOCUSATE SODIUM 100 MG: 100 CAPSULE, LIQUID FILLED ORAL at 08:58

## 2020-06-10 RX ADMIN — IBUPROFEN 600 MG: 600 TABLET ORAL at 10:32

## 2020-06-15 ENCOUNTER — POSTPARTUM VISIT (OUTPATIENT)
Dept: OBGYN CLINIC | Facility: MEDICAL CENTER | Age: 35
End: 2020-06-15

## 2020-06-15 VITALS
DIASTOLIC BLOOD PRESSURE: 72 MMHG | HEIGHT: 63 IN | WEIGHT: 173.8 LBS | SYSTOLIC BLOOD PRESSURE: 118 MMHG | BODY MASS INDEX: 30.79 KG/M2

## 2020-06-15 DIAGNOSIS — Z98.891 STATUS POST REPEAT LOW TRANSVERSE CESAREAN SECTION: Primary | ICD-10-CM

## 2020-06-15 DIAGNOSIS — D50.8 OTHER IRON DEFICIENCY ANEMIA: ICD-10-CM

## 2020-06-15 DIAGNOSIS — R51.9 HEADACHE IN FRONT OF HEAD: ICD-10-CM

## 2020-06-15 PROCEDURE — 99024 POSTOP FOLLOW-UP VISIT: CPT | Performed by: OBSTETRICS & GYNECOLOGY

## 2020-06-15 PROCEDURE — 1111F DSCHRG MED/CURRENT MED MERGE: CPT | Performed by: OBSTETRICS & GYNECOLOGY

## 2020-06-15 RX ORDER — BUTALBITAL, ACETAMINOPHEN AND CAFFEINE 300; 40; 50 MG/1; MG/1; MG/1
1 CAPSULE ORAL 4 TIMES DAILY PRN
Qty: 15 CAPSULE | Refills: 1 | Status: SHIPPED | OUTPATIENT
Start: 2020-06-15

## 2020-06-15 RX ORDER — FERROUS SULFATE TAB EC 324 MG (65 MG FE EQUIVALENT) 324 (65 FE) MG
324 TABLET DELAYED RESPONSE ORAL
Qty: 60 TABLET | Refills: 1 | Status: SHIPPED | OUTPATIENT
Start: 2020-06-15 | End: 2020-07-15

## 2020-06-16 LAB — PLACENTA IN STORAGE: NORMAL

## 2020-06-18 ENCOUNTER — HOSPITAL ENCOUNTER (INPATIENT)
Facility: HOSPITAL | Age: 35
LOS: 1 days | Discharge: HOME | DRG: 776 | End: 2020-06-20
Attending: EMERGENCY MEDICINE | Admitting: STUDENT IN AN ORGANIZED HEALTH CARE EDUCATION/TRAINING PROGRAM
Payer: COMMERCIAL

## 2020-06-18 DIAGNOSIS — R51.9 HEADACHE: Primary | ICD-10-CM

## 2020-06-18 DIAGNOSIS — Z98.891 STATUS POST REPEAT LOW TRANSVERSE CESAREAN SECTION: ICD-10-CM

## 2020-06-18 DIAGNOSIS — O14.90 PRE-ECLAMPSIA: ICD-10-CM

## 2020-06-18 PROCEDURE — 99285 EMERGENCY DEPT VISIT HI MDM: CPT

## 2020-06-19 ENCOUNTER — APPOINTMENT (EMERGENCY)
Dept: CT IMAGING | Facility: HOSPITAL | Age: 35
DRG: 776 | End: 2020-06-19
Payer: COMMERCIAL

## 2020-06-19 LAB
ALBUMIN SERPL BCP-MCNC: 3.1 G/DL (ref 3.5–5)
ALBUMIN SERPL BCP-MCNC: 3.3 G/DL (ref 3.5–5)
ALBUMIN SERPL BCP-MCNC: 3.3 G/DL (ref 3.5–5)
ALP SERPL-CCNC: 90 U/L (ref 46–116)
ALP SERPL-CCNC: 91 U/L (ref 46–116)
ALP SERPL-CCNC: 94 U/L (ref 46–116)
ALT SERPL W P-5'-P-CCNC: 27 U/L (ref 12–78)
ALT SERPL W P-5'-P-CCNC: 27 U/L (ref 12–78)
ALT SERPL W P-5'-P-CCNC: 28 U/L (ref 12–78)
ANION GAP SERPL CALCULATED.3IONS-SCNC: 10 MMOL/L (ref 4–13)
ANION GAP SERPL CALCULATED.3IONS-SCNC: 11 MMOL/L (ref 4–13)
ANION GAP SERPL CALCULATED.3IONS-SCNC: 9 MMOL/L (ref 4–13)
AST SERPL W P-5'-P-CCNC: 20 U/L (ref 5–45)
AST SERPL W P-5'-P-CCNC: 21 U/L (ref 5–45)
AST SERPL W P-5'-P-CCNC: 28 U/L (ref 5–45)
BACTERIA UR QL AUTO: ABNORMAL /HPF
BASOPHILS # BLD AUTO: 0.02 THOUSANDS/ΜL (ref 0–0.1)
BASOPHILS NFR BLD AUTO: 0 % (ref 0–1)
BILIRUB SERPL-MCNC: 0.23 MG/DL (ref 0.2–1)
BILIRUB SERPL-MCNC: 0.24 MG/DL (ref 0.2–1)
BILIRUB SERPL-MCNC: 0.26 MG/DL (ref 0.2–1)
BILIRUB UR QL STRIP: NEGATIVE
BUN SERPL-MCNC: 10 MG/DL (ref 5–25)
BUN SERPL-MCNC: 13 MG/DL (ref 5–25)
BUN SERPL-MCNC: 17 MG/DL (ref 5–25)
CALCIUM SERPL-MCNC: 8.2 MG/DL (ref 8.3–10.1)
CALCIUM SERPL-MCNC: 9.3 MG/DL (ref 8.3–10.1)
CALCIUM SERPL-MCNC: 9.3 MG/DL (ref 8.3–10.1)
CHLORIDE SERPL-SCNC: 104 MMOL/L (ref 100–108)
CHLORIDE SERPL-SCNC: 104 MMOL/L (ref 100–108)
CHLORIDE SERPL-SCNC: 105 MMOL/L (ref 100–108)
CLARITY UR: CLEAR
CO2 SERPL-SCNC: 24 MMOL/L (ref 21–32)
CO2 SERPL-SCNC: 26 MMOL/L (ref 21–32)
CO2 SERPL-SCNC: 27 MMOL/L (ref 21–32)
COLOR UR: YELLOW
CREAT SERPL-MCNC: 0.69 MG/DL (ref 0.6–1.3)
CREAT SERPL-MCNC: 0.86 MG/DL (ref 0.6–1.3)
CREAT SERPL-MCNC: 0.93 MG/DL (ref 0.6–1.3)
CREAT UR-MCNC: 26.9 MG/DL
EOSINOPHIL # BLD AUTO: 0.13 THOUSAND/ΜL (ref 0–0.61)
EOSINOPHIL NFR BLD AUTO: 2 % (ref 0–6)
ERYTHROCYTE [DISTWIDTH] IN BLOOD BY AUTOMATED COUNT: 13 % (ref 11.6–15.1)
ERYTHROCYTE [DISTWIDTH] IN BLOOD BY AUTOMATED COUNT: 13.1 % (ref 11.6–15.1)
ERYTHROCYTE [DISTWIDTH] IN BLOOD BY AUTOMATED COUNT: 13.1 % (ref 11.6–15.1)
GFR SERPL CREATININE-BSD FRML MDRD: 101 ML/MIN/1.73SQ M
GFR SERPL CREATININE-BSD FRML MDRD: 130 ML/MIN/1.73SQ M
GFR SERPL CREATININE-BSD FRML MDRD: 92 ML/MIN/1.73SQ M
GLUCOSE SERPL-MCNC: 102 MG/DL (ref 65–140)
GLUCOSE SERPL-MCNC: 83 MG/DL (ref 65–140)
GLUCOSE SERPL-MCNC: 91 MG/DL (ref 65–140)
GLUCOSE UR STRIP-MCNC: NEGATIVE MG/DL
HCT VFR BLD AUTO: 36.5 % (ref 34.8–46.1)
HCT VFR BLD AUTO: 38 % (ref 34.8–46.1)
HCT VFR BLD AUTO: 39 % (ref 34.8–46.1)
HGB BLD-MCNC: 11.8 G/DL (ref 11.5–15.4)
HGB BLD-MCNC: 12.3 G/DL (ref 11.5–15.4)
HGB BLD-MCNC: 12.6 G/DL (ref 11.5–15.4)
HGB UR QL STRIP.AUTO: ABNORMAL
IMM GRANULOCYTES # BLD AUTO: 0.07 THOUSAND/UL (ref 0–0.2)
IMM GRANULOCYTES NFR BLD AUTO: 1 % (ref 0–2)
KETONES UR STRIP-MCNC: NEGATIVE MG/DL
LEUKOCYTE ESTERASE UR QL STRIP: ABNORMAL
LYMPHOCYTES # BLD AUTO: 2.68 THOUSANDS/ΜL (ref 0.6–4.47)
LYMPHOCYTES NFR BLD AUTO: 38 % (ref 14–44)
MAGNESIUM SERPL-MCNC: 4.8 MG/DL (ref 1.6–2.6)
MAGNESIUM SERPL-MCNC: 6.3 MG/DL (ref 1.6–2.6)
MCH RBC QN AUTO: 30.3 PG (ref 26.8–34.3)
MCH RBC QN AUTO: 30.7 PG (ref 26.8–34.3)
MCH RBC QN AUTO: 30.8 PG (ref 26.8–34.3)
MCHC RBC AUTO-ENTMCNC: 32.3 G/DL (ref 31.4–37.4)
MCHC RBC AUTO-ENTMCNC: 32.3 G/DL (ref 31.4–37.4)
MCHC RBC AUTO-ENTMCNC: 32.4 G/DL (ref 31.4–37.4)
MCV RBC AUTO: 94 FL (ref 82–98)
MCV RBC AUTO: 95 FL (ref 82–98)
MCV RBC AUTO: 95 FL (ref 82–98)
MONOCYTES # BLD AUTO: 0.6 THOUSAND/ΜL (ref 0.17–1.22)
MONOCYTES NFR BLD AUTO: 9 % (ref 4–12)
MUCOUS THREADS UR QL AUTO: ABNORMAL
NEUTROPHILS # BLD AUTO: 3.6 THOUSANDS/ΜL (ref 1.85–7.62)
NEUTS SEG NFR BLD AUTO: 50 % (ref 43–75)
NITRITE UR QL STRIP: NEGATIVE
NON-SQ EPI CELLS URNS QL MICRO: ABNORMAL /HPF
NRBC BLD AUTO-RTO: 0 /100 WBCS
PH UR STRIP.AUTO: 6 [PH] (ref 4.5–8)
PLATELET # BLD AUTO: 346 THOUSANDS/UL (ref 149–390)
PLATELET # BLD AUTO: 349 THOUSANDS/UL (ref 149–390)
PLATELET # BLD AUTO: 356 THOUSANDS/UL (ref 149–390)
PMV BLD AUTO: 9.2 FL (ref 8.9–12.7)
PMV BLD AUTO: 9.2 FL (ref 8.9–12.7)
PMV BLD AUTO: 9.4 FL (ref 8.9–12.7)
POTASSIUM SERPL-SCNC: 3.5 MMOL/L (ref 3.5–5.3)
POTASSIUM SERPL-SCNC: 3.8 MMOL/L (ref 3.5–5.3)
POTASSIUM SERPL-SCNC: 4 MMOL/L (ref 3.5–5.3)
PROT SERPL-MCNC: 7.1 G/DL (ref 6.4–8.2)
PROT SERPL-MCNC: 7.3 G/DL (ref 6.4–8.2)
PROT SERPL-MCNC: 7.5 G/DL (ref 6.4–8.2)
PROT UR STRIP-MCNC: ABNORMAL MG/DL
PROT UR-MCNC: 21 MG/DL
PROT/CREAT UR: 0.78 MG/G{CREAT} (ref 0–0.1)
RBC # BLD AUTO: 3.83 MILLION/UL (ref 3.81–5.12)
RBC # BLD AUTO: 4.06 MILLION/UL (ref 3.81–5.12)
RBC # BLD AUTO: 4.11 MILLION/UL (ref 3.81–5.12)
RBC #/AREA URNS AUTO: ABNORMAL /HPF
SODIUM SERPL-SCNC: 140 MMOL/L (ref 136–145)
SP GR UR STRIP.AUTO: <=1.005 (ref 1–1.03)
UROBILINOGEN UR QL STRIP.AUTO: 0.2 E.U./DL
WBC # BLD AUTO: 6.99 THOUSAND/UL (ref 4.31–10.16)
WBC # BLD AUTO: 7.1 THOUSAND/UL (ref 4.31–10.16)
WBC # BLD AUTO: 8.39 THOUSAND/UL (ref 4.31–10.16)
WBC #/AREA URNS AUTO: ABNORMAL /HPF

## 2020-06-19 PROCEDURE — 36415 COLL VENOUS BLD VENIPUNCTURE: CPT | Performed by: EMERGENCY MEDICINE

## 2020-06-19 PROCEDURE — 80053 COMPREHEN METABOLIC PANEL: CPT | Performed by: EMERGENCY MEDICINE

## 2020-06-19 PROCEDURE — 70450 CT HEAD/BRAIN W/O DYE: CPT

## 2020-06-19 PROCEDURE — 84156 ASSAY OF PROTEIN URINE: CPT | Performed by: OBSTETRICS & GYNECOLOGY

## 2020-06-19 PROCEDURE — 85027 COMPLETE CBC AUTOMATED: CPT | Performed by: OBSTETRICS & GYNECOLOGY

## 2020-06-19 PROCEDURE — 99285 EMERGENCY DEPT VISIT HI MDM: CPT | Performed by: EMERGENCY MEDICINE

## 2020-06-19 PROCEDURE — 85025 COMPLETE CBC W/AUTO DIFF WBC: CPT | Performed by: EMERGENCY MEDICINE

## 2020-06-19 PROCEDURE — 83735 ASSAY OF MAGNESIUM: CPT | Performed by: OBSTETRICS & GYNECOLOGY

## 2020-06-19 PROCEDURE — 80053 COMPREHEN METABOLIC PANEL: CPT | Performed by: OBSTETRICS & GYNECOLOGY

## 2020-06-19 PROCEDURE — 99221 1ST HOSP IP/OBS SF/LOW 40: CPT | Performed by: OBSTETRICS & GYNECOLOGY

## 2020-06-19 PROCEDURE — 96375 TX/PRO/DX INJ NEW DRUG ADDON: CPT

## 2020-06-19 PROCEDURE — 96374 THER/PROPH/DIAG INJ IV PUSH: CPT

## 2020-06-19 PROCEDURE — 81001 URINALYSIS AUTO W/SCOPE: CPT

## 2020-06-19 PROCEDURE — 82570 ASSAY OF URINE CREATININE: CPT | Performed by: OBSTETRICS & GYNECOLOGY

## 2020-06-19 RX ORDER — LIDOCAINE HYDROCHLORIDE 10 MG/ML
5 INJECTION, SOLUTION EPIDURAL; INFILTRATION; INTRACAUDAL; PERINEURAL ONCE
Status: COMPLETED | OUTPATIENT
Start: 2020-06-19 | End: 2020-06-19

## 2020-06-19 RX ORDER — ACETAMINOPHEN 325 MG/1
650 TABLET ORAL EVERY 6 HOURS PRN
Status: DISCONTINUED | OUTPATIENT
Start: 2020-06-19 | End: 2020-06-20 | Stop reason: HOSPADM

## 2020-06-19 RX ORDER — OXYCODONE HYDROCHLORIDE 5 MG/1
5 TABLET ORAL EVERY 4 HOURS PRN
Status: ACTIVE | OUTPATIENT
Start: 2020-06-19 | End: 2020-06-20

## 2020-06-19 RX ORDER — LABETALOL 20 MG/4 ML (5 MG/ML) INTRAVENOUS SYRINGE
10 ONCE
Status: DISCONTINUED | OUTPATIENT
Start: 2020-06-19 | End: 2020-06-19

## 2020-06-19 RX ORDER — ACETAMINOPHEN 325 MG/1
975 TABLET ORAL ONCE
Status: DISCONTINUED | OUTPATIENT
Start: 2020-06-19 | End: 2020-06-19

## 2020-06-19 RX ORDER — CALCIUM CARBONATE 200(500)MG
1000 TABLET,CHEWABLE ORAL DAILY PRN
OUTPATIENT
Start: 2020-06-19

## 2020-06-19 RX ORDER — KETOROLAC TROMETHAMINE 30 MG/ML
15 INJECTION, SOLUTION INTRAMUSCULAR; INTRAVENOUS ONCE
Status: DISCONTINUED | OUTPATIENT
Start: 2020-06-19 | End: 2020-06-19

## 2020-06-19 RX ORDER — ACETAMINOPHEN 325 MG/1
650 TABLET ORAL EVERY 4 HOURS PRN
OUTPATIENT
Start: 2020-06-19

## 2020-06-19 RX ORDER — IBUPROFEN 600 MG/1
600 TABLET ORAL EVERY 6 HOURS PRN
Status: DISCONTINUED | OUTPATIENT
Start: 2020-06-19 | End: 2020-06-20 | Stop reason: HOSPADM

## 2020-06-19 RX ORDER — KETOROLAC TROMETHAMINE 30 MG/ML
15 INJECTION, SOLUTION INTRAMUSCULAR; INTRAVENOUS ONCE
Status: COMPLETED | OUTPATIENT
Start: 2020-06-19 | End: 2020-06-19

## 2020-06-19 RX ORDER — METOCLOPRAMIDE HYDROCHLORIDE 5 MG/ML
10 INJECTION INTRAMUSCULAR; INTRAVENOUS ONCE
Status: COMPLETED | OUTPATIENT
Start: 2020-06-19 | End: 2020-06-19

## 2020-06-19 RX ORDER — LABETALOL 20 MG/4 ML (5 MG/ML) INTRAVENOUS SYRINGE
20 ONCE
Status: COMPLETED | OUTPATIENT
Start: 2020-06-19 | End: 2020-06-19

## 2020-06-19 RX ORDER — MAGNESIUM SULFATE HEPTAHYDRATE 40 MG/ML
2 INJECTION, SOLUTION INTRAVENOUS ONCE
OUTPATIENT
Start: 2020-06-19

## 2020-06-19 RX ORDER — BUTALBITAL, ACETAMINOPHEN AND CAFFEINE 50; 325; 40 MG/1; MG/1; MG/1
1 TABLET ORAL EVERY 6 HOURS PRN
Status: DISCONTINUED | OUTPATIENT
Start: 2020-06-19 | End: 2020-06-19

## 2020-06-19 RX ORDER — BUTALBITAL, ACETAMINOPHEN AND CAFFEINE 50; 325; 40 MG/1; MG/1; MG/1
1 TABLET ORAL EVERY 6 HOURS PRN
Status: DISCONTINUED | OUTPATIENT
Start: 2020-06-19 | End: 2020-06-20 | Stop reason: HOSPADM

## 2020-06-19 RX ORDER — LABETALOL 20 MG/4 ML (5 MG/ML) INTRAVENOUS SYRINGE
40 ONCE
Status: COMPLETED | OUTPATIENT
Start: 2020-06-19 | End: 2020-06-19

## 2020-06-19 RX ORDER — MAGNESIUM SULFATE HEPTAHYDRATE 40 MG/ML
2 INJECTION, SOLUTION INTRAVENOUS CONTINUOUS
OUTPATIENT
Start: 2020-06-19

## 2020-06-19 RX ORDER — DOCUSATE SODIUM 100 MG/1
100 CAPSULE, LIQUID FILLED ORAL 2 TIMES DAILY
Status: DISCONTINUED | OUTPATIENT
Start: 2020-06-19 | End: 2020-06-20 | Stop reason: HOSPADM

## 2020-06-19 RX ORDER — MAGNESIUM SULFATE HEPTAHYDRATE 40 MG/ML
4 INJECTION, SOLUTION INTRAVENOUS ONCE
Status: COMPLETED | OUTPATIENT
Start: 2020-06-19 | End: 2020-06-19

## 2020-06-19 RX ORDER — ACETAMINOPHEN 325 MG/1
975 TABLET ORAL ONCE
Status: COMPLETED | OUTPATIENT
Start: 2020-06-19 | End: 2020-06-19

## 2020-06-19 RX ORDER — MAGNESIUM SULFATE HEPTAHYDRATE 40 MG/ML
2 INJECTION, SOLUTION INTRAVENOUS CONTINUOUS
Status: DISCONTINUED | OUTPATIENT
Start: 2020-06-19 | End: 2020-06-20

## 2020-06-19 RX ORDER — MAGNESIUM SULFATE HEPTAHYDRATE 40 MG/ML
2 INJECTION, SOLUTION INTRAVENOUS ONCE
Status: COMPLETED | OUTPATIENT
Start: 2020-06-19 | End: 2020-06-19

## 2020-06-19 RX ORDER — NIFEDIPINE 30 MG/1
30 TABLET, EXTENDED RELEASE ORAL DAILY
Status: DISCONTINUED | OUTPATIENT
Start: 2020-06-19 | End: 2020-06-20 | Stop reason: HOSPADM

## 2020-06-19 RX ORDER — MAGNESIUM SULFATE HEPTAHYDRATE 40 MG/ML
4 INJECTION, SOLUTION INTRAVENOUS ONCE
OUTPATIENT
Start: 2020-06-19

## 2020-06-19 RX ADMIN — MAGNESIUM SULFATE IN WATER 4 G: 40 INJECTION, SOLUTION INTRAVENOUS at 03:58

## 2020-06-19 RX ADMIN — LABETALOL 20 MG/4 ML (5 MG/ML) INTRAVENOUS SYRINGE 20 MG: at 03:02

## 2020-06-19 RX ADMIN — LABETALOL 20 MG/4 ML (5 MG/ML) INTRAVENOUS SYRINGE 40 MG: at 03:34

## 2020-06-19 RX ADMIN — ACETAMINOPHEN 975 MG: 325 TABLET ORAL at 00:50

## 2020-06-19 RX ADMIN — BUTALBITAL, ACETAMINOPHEN AND CAFFEINE 1 TABLET: 50; 325; 40 TABLET ORAL at 09:50

## 2020-06-19 RX ADMIN — DOCUSATE SODIUM 100 MG: 100 CAPSULE, LIQUID FILLED ORAL at 09:50

## 2020-06-19 RX ADMIN — KETOROLAC TROMETHAMINE 15 MG: 30 INJECTION, SOLUTION INTRAMUSCULAR at 00:21

## 2020-06-19 RX ADMIN — MAGNESIUM SULFATE IN WATER 2 G/HR: 40 INJECTION, SOLUTION INTRAVENOUS at 05:10

## 2020-06-19 RX ADMIN — LIDOCAINE HYDROCHLORIDE 5 ML: 10 INJECTION, SOLUTION EPIDURAL; INFILTRATION; INTRACAUDAL; PERINEURAL at 00:21

## 2020-06-19 RX ADMIN — DOCUSATE SODIUM 100 MG: 100 CAPSULE, LIQUID FILLED ORAL at 18:30

## 2020-06-19 RX ADMIN — NIFEDIPINE 30 MG: 30 TABLET, FILM COATED, EXTENDED RELEASE ORAL at 04:45

## 2020-06-19 RX ADMIN — MAGNESIUM SULFATE IN WATER 2 G/HR: 40 INJECTION, SOLUTION INTRAVENOUS at 15:56

## 2020-06-19 RX ADMIN — METOCLOPRAMIDE 10 MG: 5 INJECTION, SOLUTION INTRAMUSCULAR; INTRAVENOUS at 00:24

## 2020-06-19 RX ADMIN — IBUPROFEN 600 MG: 600 TABLET ORAL at 15:56

## 2020-06-19 RX ADMIN — MAGNESIUM SULFATE IN WATER 2 G: 40 INJECTION, SOLUTION INTRAVENOUS at 04:29

## 2020-06-20 VITALS
TEMPERATURE: 98.3 F | RESPIRATION RATE: 18 BRPM | BODY MASS INDEX: 30.85 KG/M2 | OXYGEN SATURATION: 100 % | WEIGHT: 174.16 LBS | SYSTOLIC BLOOD PRESSURE: 121 MMHG | DIASTOLIC BLOOD PRESSURE: 74 MMHG | HEART RATE: 75 BPM

## 2020-06-20 LAB
ALBUMIN SERPL BCP-MCNC: 3.1 G/DL (ref 3.5–5)
ALBUMIN SERPL BCP-MCNC: 3.1 G/DL (ref 3.5–5)
ALP SERPL-CCNC: 89 U/L (ref 46–116)
ALP SERPL-CCNC: 95 U/L (ref 46–116)
ALT SERPL W P-5'-P-CCNC: 24 U/L (ref 12–78)
ALT SERPL W P-5'-P-CCNC: 28 U/L (ref 12–78)
ANION GAP SERPL CALCULATED.3IONS-SCNC: 11 MMOL/L (ref 4–13)
ANION GAP SERPL CALCULATED.3IONS-SCNC: 11 MMOL/L (ref 4–13)
AST SERPL W P-5'-P-CCNC: 18 U/L (ref 5–45)
AST SERPL W P-5'-P-CCNC: 22 U/L (ref 5–45)
BILIRUB SERPL-MCNC: 0.24 MG/DL (ref 0.2–1)
BILIRUB SERPL-MCNC: 0.26 MG/DL (ref 0.2–1)
BUN SERPL-MCNC: 10 MG/DL (ref 5–25)
BUN SERPL-MCNC: 11 MG/DL (ref 5–25)
CALCIUM SERPL-MCNC: 7.4 MG/DL (ref 8.3–10.1)
CALCIUM SERPL-MCNC: 7.4 MG/DL (ref 8.3–10.1)
CHLORIDE SERPL-SCNC: 103 MMOL/L (ref 100–108)
CHLORIDE SERPL-SCNC: 106 MMOL/L (ref 100–108)
CO2 SERPL-SCNC: 25 MMOL/L (ref 21–32)
CO2 SERPL-SCNC: 25 MMOL/L (ref 21–32)
CREAT SERPL-MCNC: 0.71 MG/DL (ref 0.6–1.3)
CREAT SERPL-MCNC: 0.74 MG/DL (ref 0.6–1.3)
ERYTHROCYTE [DISTWIDTH] IN BLOOD BY AUTOMATED COUNT: 13.2 % (ref 11.6–15.1)
ERYTHROCYTE [DISTWIDTH] IN BLOOD BY AUTOMATED COUNT: 13.3 % (ref 11.6–15.1)
GFR SERPL CREATININE-BSD FRML MDRD: 121 ML/MIN/1.73SQ M
GFR SERPL CREATININE-BSD FRML MDRD: 128 ML/MIN/1.73SQ M
GLUCOSE SERPL-MCNC: 100 MG/DL (ref 65–140)
GLUCOSE SERPL-MCNC: 80 MG/DL (ref 65–140)
HCT VFR BLD AUTO: 35.6 % (ref 34.8–46.1)
HCT VFR BLD AUTO: 37.8 % (ref 34.8–46.1)
HGB BLD-MCNC: 11.5 G/DL (ref 11.5–15.4)
HGB BLD-MCNC: 12 G/DL (ref 11.5–15.4)
MAGNESIUM SERPL-MCNC: 4 MG/DL (ref 1.6–2.6)
MAGNESIUM SERPL-MCNC: 6.8 MG/DL (ref 1.6–2.6)
MCH RBC QN AUTO: 30.4 PG (ref 26.8–34.3)
MCH RBC QN AUTO: 30.8 PG (ref 26.8–34.3)
MCHC RBC AUTO-ENTMCNC: 31.7 G/DL (ref 31.4–37.4)
MCHC RBC AUTO-ENTMCNC: 32.3 G/DL (ref 31.4–37.4)
MCV RBC AUTO: 94 FL (ref 82–98)
MCV RBC AUTO: 97 FL (ref 82–98)
PLATELET # BLD AUTO: 328 THOUSANDS/UL (ref 149–390)
PLATELET # BLD AUTO: 351 THOUSANDS/UL (ref 149–390)
PMV BLD AUTO: 9.5 FL (ref 8.9–12.7)
PMV BLD AUTO: 9.7 FL (ref 8.9–12.7)
POTASSIUM SERPL-SCNC: 3.2 MMOL/L (ref 3.5–5.3)
POTASSIUM SERPL-SCNC: 3.3 MMOL/L (ref 3.5–5.3)
PROT SERPL-MCNC: 6.8 G/DL (ref 6.4–8.2)
PROT SERPL-MCNC: 6.9 G/DL (ref 6.4–8.2)
RBC # BLD AUTO: 3.78 MILLION/UL (ref 3.81–5.12)
RBC # BLD AUTO: 3.9 MILLION/UL (ref 3.81–5.12)
SODIUM SERPL-SCNC: 139 MMOL/L (ref 136–145)
SODIUM SERPL-SCNC: 142 MMOL/L (ref 136–145)
WBC # BLD AUTO: 6.36 THOUSAND/UL (ref 4.31–10.16)
WBC # BLD AUTO: 6.37 THOUSAND/UL (ref 4.31–10.16)

## 2020-06-20 PROCEDURE — 85027 COMPLETE CBC AUTOMATED: CPT | Performed by: OBSTETRICS & GYNECOLOGY

## 2020-06-20 PROCEDURE — 83735 ASSAY OF MAGNESIUM: CPT | Performed by: OBSTETRICS & GYNECOLOGY

## 2020-06-20 PROCEDURE — 80053 COMPREHEN METABOLIC PANEL: CPT | Performed by: OBSTETRICS & GYNECOLOGY

## 2020-06-20 PROCEDURE — 99215 OFFICE O/P EST HI 40 MIN: CPT

## 2020-06-20 PROCEDURE — 99238 HOSP IP/OBS DSCHRG MGMT 30/<: CPT | Performed by: OBSTETRICS & GYNECOLOGY

## 2020-06-20 RX ORDER — NIFEDIPINE 30 MG/1
30 TABLET, FILM COATED, EXTENDED RELEASE ORAL DAILY
Qty: 30 TABLET | Refills: 2 | Status: SHIPPED | OUTPATIENT
Start: 2020-06-20 | End: 2020-07-06

## 2020-06-20 RX ORDER — IBUPROFEN 600 MG/1
600 TABLET ORAL EVERY 6 HOURS PRN
Qty: 50 TABLET | Refills: 1 | Status: SHIPPED | OUTPATIENT
Start: 2020-06-20

## 2020-06-20 RX ADMIN — IBUPROFEN 600 MG: 600 TABLET ORAL at 03:28

## 2020-06-20 RX ADMIN — DOCUSATE SODIUM 100 MG: 100 CAPSULE, LIQUID FILLED ORAL at 08:48

## 2020-06-20 RX ADMIN — NIFEDIPINE 30 MG: 30 TABLET, FILM COATED, EXTENDED RELEASE ORAL at 08:48

## 2020-06-24 ENCOUNTER — TELEPHONE (OUTPATIENT)
Dept: OBGYN CLINIC | Facility: MEDICAL CENTER | Age: 35
End: 2020-06-24

## 2020-06-24 ENCOUNTER — POSTPARTUM VISIT (OUTPATIENT)
Dept: OBGYN CLINIC | Facility: MEDICAL CENTER | Age: 35
End: 2020-06-24

## 2020-06-24 VITALS
HEIGHT: 63 IN | BODY MASS INDEX: 29.59 KG/M2 | SYSTOLIC BLOOD PRESSURE: 120 MMHG | DIASTOLIC BLOOD PRESSURE: 82 MMHG | WEIGHT: 167 LBS | HEART RATE: 98 BPM | TEMPERATURE: 97.7 F

## 2020-06-24 DIAGNOSIS — Z98.891 STATUS POST REPEAT LOW TRANSVERSE CESAREAN SECTION: ICD-10-CM

## 2020-06-24 PROCEDURE — 99024 POSTOP FOLLOW-UP VISIT: CPT | Performed by: OBSTETRICS & GYNECOLOGY

## 2020-06-24 PROCEDURE — 1111F DSCHRG MED/CURRENT MED MERGE: CPT | Performed by: OBSTETRICS & GYNECOLOGY

## 2020-06-25 ENCOUNTER — TELEMEDICINE (OUTPATIENT)
Dept: FAMILY MEDICINE CLINIC | Facility: CLINIC | Age: 35
End: 2020-06-25

## 2020-06-25 DIAGNOSIS — S16.1XXA STRAIN OF NECK MUSCLE, INITIAL ENCOUNTER: Primary | ICD-10-CM

## 2020-06-25 PROCEDURE — 99441 PR PHYS/QHP TELEPHONE EVALUATION 5-10 MIN: CPT | Performed by: PHYSICIAN ASSISTANT

## 2020-06-25 RX ORDER — BACLOFEN 10 MG/1
10 TABLET ORAL 2 TIMES DAILY PRN
Qty: 20 TABLET | Refills: 0 | Status: SHIPPED | OUTPATIENT
Start: 2020-06-25

## 2020-07-06 ENCOUNTER — POSTPARTUM VISIT (OUTPATIENT)
Dept: OBGYN CLINIC | Facility: MEDICAL CENTER | Age: 35
End: 2020-07-06

## 2020-07-06 VITALS
SYSTOLIC BLOOD PRESSURE: 110 MMHG | DIASTOLIC BLOOD PRESSURE: 70 MMHG | WEIGHT: 162.4 LBS | BODY MASS INDEX: 28.77 KG/M2 | TEMPERATURE: 97.9 F

## 2020-07-06 DIAGNOSIS — Z30.41 ENCOUNTER FOR SURVEILLANCE OF CONTRACEPTIVE PILLS: ICD-10-CM

## 2020-07-06 DIAGNOSIS — O24.414 INSULIN CONTROLLED GESTATIONAL DIABETES MELLITUS (GDM) IN THIRD TRIMESTER: ICD-10-CM

## 2020-07-06 PROBLEM — O34.10 UTERINE FIBROID IN PREGNANCY: Status: RESOLVED | Noted: 2019-12-20 | Resolved: 2020-07-06

## 2020-07-06 PROBLEM — D25.9 UTERINE FIBROID IN PREGNANCY: Status: RESOLVED | Noted: 2019-12-20 | Resolved: 2020-07-06

## 2020-07-06 PROBLEM — O09.529 ANTEPARTUM MULTIGRAVIDA OF ADVANCED MATERNAL AGE: Status: RESOLVED | Noted: 2019-12-20 | Resolved: 2020-07-06

## 2020-07-06 PROBLEM — O34.219 PREGNANCY WITH HISTORY OF CESAREAN SECTION, ANTEPARTUM: Status: RESOLVED | Noted: 2019-12-19 | Resolved: 2020-07-06

## 2020-07-06 PROBLEM — Z3A.37 37 WEEKS GESTATION OF PREGNANCY: Status: RESOLVED | Noted: 2019-12-23 | Resolved: 2020-07-06

## 2020-07-06 PROBLEM — O99.810 HYPERGLYCEMIA DURING PREGNANCY: Status: RESOLVED | Noted: 2020-04-29 | Resolved: 2020-07-06

## 2020-07-06 PROCEDURE — 99024 POSTOP FOLLOW-UP VISIT: CPT | Performed by: OBSTETRICS & GYNECOLOGY

## 2020-07-06 PROCEDURE — 1111F DSCHRG MED/CURRENT MED MERGE: CPT | Performed by: OBSTETRICS & GYNECOLOGY

## 2020-07-06 RX ORDER — ACETAMINOPHEN AND CODEINE PHOSPHATE 120; 12 MG/5ML; MG/5ML
1 SOLUTION ORAL DAILY
Qty: 90 TABLET | Refills: 3 | Status: SHIPPED | OUTPATIENT
Start: 2020-07-06

## 2020-07-06 RX ORDER — NIFEDIPINE 30 MG/1
30 TABLET, FILM COATED, EXTENDED RELEASE ORAL DAILY
Qty: 30 TABLET | Refills: 2
Start: 2020-07-06

## 2020-07-06 NOTE — PROGRESS NOTES
OB POSTPARTUM VISIT PROGRESS NOTE  Date of Encounter: 2020    Dominga Tavarez    : 1985  (27 y o )  MR: [id number]    Bel Ruffin is in for her postpartum visit  She is now [id number]  She delivered by repeat  section  She delivered a Male on 2020  Infant's name is Eloisa Lilly  Patient was delivered by [facility]  She's generally doing well, denies current pain or bleeding issues, and has no significant depression issues  Patient was readmitted after delivery with postpartum pre-eclampsia  She is still taking procardia  States her BP range at home from 120-170s/70-90s  She is breast feeding exclusively  History of diabetes in pregnancy Yes   Complications in pregnancy: No   We discussed all appropriate contraceptive options and she chooses POP  Objective     /70   Temp 97 9 °F (36 6 °C)   Wt 73 7 kg (162 lb 6 4 oz)   LMP 2019 (Within Days)   BMI 28 77 kg/m²     General:   appears stated age, cooperative, alert normal mood and affect   Neck: Neck: normal, supple,trachea midline, no masses   Heart: regular rate and rhythm, S1, S2 normal, no murmur, click, rub or gallop   Lungs: clear to auscultation bilaterally   Breasts: Deferred, breastfeeding   Abdomen: soft, non-tender, without masses or organomegaly   Vulva: normal, normal female genitalia, Bartholin's, Urethra, [place] normal, no lesions, normal female hair distribution   Vagina: normal vagina, no discharge, exudate, lesion, or erythema   Urethra: normal   Cervix: Normal, no discharge  Uterus: normal size, contour, position, consistency, mobility, non-tender   Adnexa: normal adnexa and no mass, fullness, tenderness     Assessment/Plan   Diagnoses and all orders for this visit:    Postpartum exam    Insulin controlled gestational diabetes mellitus (GDM) in third trimester  -     Cancel: Glucose MINDY 2HR 75GM Nonpreg; Future  -     Glucose MINDY 2HR 75GM Nonpreg;  Future    Encounter for surveillance of contraceptive pills  -     norethindrone (MICRONOR) 0 35 MG tablet; Take 1 tablet (0 35 mg total) by mouth daily      27 y o  y/o  now 6 weeks post RLTCS doing well  1  LMP: not yet  2  Last pap smear: 2018  3  Contraception: POP  4  RTO in 1 year for annual visit  5  Baby and Me resources provided if needed  6  Baby: Doing well and meeting all milestones accordingly as per patient from the visit to the pediatrician  7  EPDS: 2  8   Postpartum pre-eclampsia: on Procardia, continue until 12 weeks     Makayla Mena MD

## 2020-07-09 ENCOUNTER — TELEPHONE (OUTPATIENT)
Dept: OBGYN CLINIC | Facility: MEDICAL CENTER | Age: 35
End: 2020-07-09

## 2020-07-09 NOTE — TELEPHONE ENCOUNTER
Requesting a call back regarding her FMLA papers  States the papers were originally signed off on for 6 weeks  But when she saw Dr Romel Montoya on Monday she said we could update them to 8 weeks  Patient inquiring if this was done so she can let HR know

## 2020-08-19 ENCOUNTER — APPOINTMENT (OUTPATIENT)
Dept: LAB | Facility: HOSPITAL | Age: 35
End: 2020-08-19
Attending: OBSTETRICS & GYNECOLOGY
Payer: COMMERCIAL

## 2020-08-19 DIAGNOSIS — O24.414 INSULIN CONTROLLED GESTATIONAL DIABETES MELLITUS (GDM) IN THIRD TRIMESTER: ICD-10-CM

## 2020-08-19 LAB
GLUCOSE 2H P 75 G GLC PO SERPL-MCNC: 125 MG/DL (ref 65–139)
GLUCOSE P FAST SERPL-MCNC: 87 MG/DL (ref 65–99)

## 2020-08-19 PROCEDURE — 82947 ASSAY GLUCOSE BLOOD QUANT: CPT

## 2020-08-19 PROCEDURE — 36415 COLL VENOUS BLD VENIPUNCTURE: CPT

## 2020-08-19 PROCEDURE — 82950 GLUCOSE TEST: CPT

## 2021-03-10 ENCOUNTER — OFFICE VISIT (OUTPATIENT)
Dept: FAMILY MEDICINE CLINIC | Facility: CLINIC | Age: 36
End: 2021-03-10

## 2021-03-10 VITALS
OXYGEN SATURATION: 98 % | WEIGHT: 164 LBS | HEIGHT: 63 IN | TEMPERATURE: 98 F | DIASTOLIC BLOOD PRESSURE: 82 MMHG | BODY MASS INDEX: 29.06 KG/M2 | RESPIRATION RATE: 16 BRPM | HEART RATE: 91 BPM | SYSTOLIC BLOOD PRESSURE: 124 MMHG

## 2021-03-10 DIAGNOSIS — M54.50 CHRONIC BILATERAL LOW BACK PAIN WITHOUT SCIATICA: Primary | ICD-10-CM

## 2021-03-10 DIAGNOSIS — G89.29 CHRONIC BILATERAL LOW BACK PAIN WITHOUT SCIATICA: Primary | ICD-10-CM

## 2021-03-10 PROCEDURE — 99213 OFFICE O/P EST LOW 20 MIN: CPT | Performed by: PHYSICIAN ASSISTANT

## 2021-03-10 RX ORDER — IBUPROFEN 600 MG/1
600 TABLET ORAL EVERY 6 HOURS PRN
Qty: 30 TABLET | Refills: 0 | Status: SHIPPED | OUTPATIENT
Start: 2021-03-10

## 2021-03-10 NOTE — PROGRESS NOTES
Assessment/Plan:    Chronic bilateral low back pain without sciatica  Discussed possibility of physical therapy or chiropractor in the future  She would like to hold off until her current coverage begins  Recommend she continue with ibuprofen and rotate with Tylenol at this time  Discussed that there is not all lot more options while she is breastfeeding  Problem List Items Addressed This Visit        Other    Chronic bilateral low back pain without sciatica - Primary     Discussed possibility of physical therapy or chiropractor in the future  She would like to hold off until her current coverage begins  Recommend she continue with ibuprofen and rotate with Tylenol at this time  Discussed that there is not all lot more options while she is breastfeeding  Relevant Medications    ibuprofen (MOTRIN) 600 mg tablet    Other Relevant Orders    XR spine lumbar minimum 4 views non injury    Ambulatory referral to Physical Therapy            Subjective:      Patient ID: Scar Garcia is a 38 y o  female  HPI 38year old F here for same day visit for back pain  Her son is 6 months old  She had back pain during pregnacy  Pain was good after birth until now but then rsarted 3/1  She tried Ibuprofen and massage  She does not know of anytihng different she did differently    She is breast feeding and twists sometiems to breast feed  She has worse pain she she is changing position and it is hard to straighten  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  The following portions of the patient's history were reviewed and updated as appropriate:   She  has a past medical history of Anemia, Fibroid, Gestational diabetes mellitus (GDM) in first trimester (12/23/2019), Pre-eclampsia, delivered, with postpartum complication (5/88/8784), and Varicella    She   Patient Active Problem List    Diagnosis Date Noted    Chronic bilateral low back pain without sciatica 03/10/2021  Status post repeat low transverse  section 2020    Prediabetes 2019    Pregnancy with history of uterine myomectomy 2019    History of  section 2018    Headache 2016    Anemia 2015     She  has a past surgical history that includes Myomectomy;  section; and pr  delivery only (N/A, 2020)  Her family history includes Autism in her daughter; Dementia in her maternal grandmother; Diabetes in her paternal grandfather; Hypertension in her father and mother; No Known Problems in her brother, brother, sister, and sister  She  reports that she has never smoked  She has never used smokeless tobacco  She reports previous alcohol use  She reports that she does not use drugs    Current Outpatient Medications   Medication Sig Dispense Refill    ibuprofen (MOTRIN) 600 mg tablet Take 1 tablet (600 mg total) by mouth every 6 (six) hours as needed for moderate pain 50 tablet 1    acetaminophen (TYLENOL) 325 mg tablet Take 2 tablets (650 mg total) by mouth every 6 (six) hours (Patient not taking: Reported on 2020) 30 tablet 0    baclofen 10 mg tablet Take 1 tablet (10 mg total) by mouth 2 (two) times a day as needed for muscle spasms (Patient not taking: Reported on 2020) 20 tablet 0    Butalbital-APAP-Caffeine (Fioricet) -40 MG CAPS Take 1 tablet by mouth 4 (four) times a day as needed (headache) (Patient not taking: Reported on 2020) 15 capsule 1    Cholecalciferol (VITAMIN D) 50 MCG (2000 UT) CAPS Take by mouth      docusate sodium (COLACE) 100 mg capsule Take 100 mg by mouth as needed for constipation      ferrous sulfate 324 (65 Fe) mg Take 1 tablet (324 mg total) by mouth 2 (two) times a day before meals 60 tablet 1    ibuprofen (MOTRIN) 600 mg tablet Take 1 tablet (600 mg total) by mouth every 6 (six) hours as needed for mild pain 30 tablet 0    Insulin Pen Needle 31G X 5 MM MISC Inject under the skin daily at bedtime Use one a day or as directed  100 each 1    NIFEdipine ER (ADALAT CC) 30 MG 24 hr tablet Take 1 tablet (30 mg total) by mouth daily (Patient not taking: Reported on 3/10/2021) 30 tablet 2    norethindrone (MICRONOR) 0 35 MG tablet Take 1 tablet (0 35 mg total) by mouth daily (Patient not taking: Reported on 3/10/2021) 90 tablet 3    OneTouch Delica Lancets 29N MISC Use 4 a day or as recommended  (Patient not taking: Reported on 6/2/2020) 100 each 6    Prenatal Vit-Fe Fumarate-FA (PRENATAL 1 PLUS 1 PO) Take 1 tablet by mouth daily       No current facility-administered medications for this visit  Current Outpatient Medications on File Prior to Visit   Medication Sig    ibuprofen (MOTRIN) 600 mg tablet Take 1 tablet (600 mg total) by mouth every 6 (six) hours as needed for moderate pain    acetaminophen (TYLENOL) 325 mg tablet Take 2 tablets (650 mg total) by mouth every 6 (six) hours (Patient not taking: Reported on 6/18/2020)    baclofen 10 mg tablet Take 1 tablet (10 mg total) by mouth 2 (two) times a day as needed for muscle spasms (Patient not taking: Reported on 7/6/2020)    Butalbital-APAP-Caffeine (Fioricet) -40 MG CAPS Take 1 tablet by mouth 4 (four) times a day as needed (headache) (Patient not taking: Reported on 6/24/2020)    Cholecalciferol (VITAMIN D) 50 MCG (2000 UT) CAPS Take by mouth    docusate sodium (COLACE) 100 mg capsule Take 100 mg by mouth as needed for constipation    ferrous sulfate 324 (65 Fe) mg Take 1 tablet (324 mg total) by mouth 2 (two) times a day before meals    Insulin Pen Needle 31G X 5 MM MISC Inject under the skin daily at bedtime Use one a day or as directed      NIFEdipine ER (ADALAT CC) 30 MG 24 hr tablet Take 1 tablet (30 mg total) by mouth daily (Patient not taking: Reported on 3/10/2021)    norethindrone (MICRONOR) 0 35 MG tablet Take 1 tablet (0 35 mg total) by mouth daily (Patient not taking: Reported on 3/10/2021)    OneTouch Delica Lancets 33J MISC Use 4 a day or as recommended  (Patient not taking: Reported on 6/2/2020)    Prenatal Vit-Fe Fumarate-FA (PRENATAL 1 PLUS 1 PO) Take 1 tablet by mouth daily     No current facility-administered medications on file prior to visit  She has No Known Allergies       Review of Systems   Constitutional: Negative for chills and fever  Respiratory: Negative for cough and shortness of breath  Cardiovascular: Negative for chest pain  Gastrointestinal: Negative for abdominal pain  Genitourinary: Negative for difficulty urinating and dysuria  Musculoskeletal: Positive for back pain  Objective:      /82 (BP Location: Right arm, Patient Position: Sitting, Cuff Size: Large)   Pulse 91   Temp 98 °F (36 7 °C) (Temporal)   Resp 16   Ht 5' 3" (1 6 m)   Wt 74 4 kg (164 lb)   SpO2 98%   BMI 29 05 kg/m²          Physical Exam  Vitals signs and nursing note reviewed  Constitutional:       General: She is not in acute distress  Appearance: She is well-developed  HENT:      Head: Normocephalic and atraumatic  Right Ear: External ear normal       Left Ear: External ear normal    Eyes:      Conjunctiva/sclera: Conjunctivae normal    Neck:      Musculoskeletal: Normal range of motion and neck supple  Thyroid: No thyromegaly  Cardiovascular:      Rate and Rhythm: Normal rate and regular rhythm  Heart sounds: Normal heart sounds  No murmur  Pulmonary:      Effort: Pulmonary effort is normal  No respiratory distress  Breath sounds: Normal breath sounds  No wheezing  Musculoskeletal:         General: No swelling, tenderness, deformity or signs of injury  Comments: -slr , motor 5/5 le   Lymphadenopathy:      Cervical: No cervical adenopathy  Neurological:      Mental Status: She is alert and oriented to person, place, and time     Psychiatric:         Mood and Affect: Mood normal          Behavior: Behavior normal

## 2021-03-10 NOTE — PATIENT INSTRUCTIONS
Lower Back Exercises   WHAT YOU NEED TO KNOW:   Lower back exercises help heal and strengthen your back muscles to prevent another injury  Ask your healthcare provider if you need to see a physical therapist for more advanced exercises  DISCHARGE INSTRUCTIONS:   Return to the emergency department if:   · You have severe pain that prevents you from moving  Contact your healthcare provider if:   · Your pain becomes worse  · You have new pain  · You have questions or concerns about your condition or care  Do lower back exercises safely:   · Do the exercises on a mat or firm surface  (not on a bed) to support your spine and prevent low back pain  · Move slowly and smoothly  Avoid fast or jerky motions  · Breathe normally  Do not hold your breath  · Stop if you feel pain  It is normal to feel some discomfort at first  Regular exercise will help decrease your discomfort over time  Lower back exercises: Your healthcare provider may recommend that you do back exercises 10 to 30 minutes each day  He may also recommend that you do exercises 1 to 3 times each day  Ask your healthcare provider which exercises are best for you and how often to do them  · Ankle pumps:  Lie on your back  Move your foot up (with your toes pointing toward your head)  Then, move your foot down (with your toes pointing away from you)  Repeat this exercise 10 times on each side  · Heel slides:  Lie on your back  Slowly bend one leg and then straighten it  Next, bend the other leg and then straighten it  Repeat 10 times on each side  · Pelvic tilt:  Lie on your back with your knees bent and feet flat on the floor  Place your arms in a relaxed position beside your body  Tighten the muscles of your abdomen and flatten your back against the floor  Hold for 5 seconds  Repeat 5 times  · Back stretch:  Lie on your back with your hands behind your head   Bend your knees and turn the lower half of your body to one side  Hold this position for 10 seconds  Repeat 3 times on each side  · Straight leg raises:  Lie on your back with one leg straight  Bend the other knee  Tighten your abdomen and then slowly lift the straight leg up about 6 to 12 inches off the floor  Hold for 1 to 5 seconds  Lower your leg slowly  Repeat 10 times on each leg  · Knee-to-chest:  Lie on your back with your knees bent and feet flat on the floor  Pull one of your knees toward your chest and hold it there for 5 seconds  Return your leg to the starting position  Lift the other knee toward your chest and hold for 5 seconds  Do this 5 times on each side  · Cat and camel:  Place your hands and knees on the floor  Arch your back upward toward the ceiling and lower your head  Round out your spine as much as you can  Hold for 5 seconds  Lift your head upward and push your chest downward toward the floor  Hold for 5 seconds  Do 3 sets or as directed  · Wall squats:  Stand with your back against a wall  Tighten the muscles of your abdomen  Slowly lower your body until your knees are bent at a 45 degree angle  Hold this position for 5 seconds  Slowly move back up to a standing position  Repeat 10 times  · Curl up:  Lie on your back with your knees bent and feet flat on the floor  Place your hands, palms down, underneath the curve in your lower back  Next, with your elbows on the floor, lift your shoulders and chest 2 to 3 inches  Keep your head in line with your shoulders  Hold this position for 5 seconds  When you can do this exercise without pain for 10 to 15 seconds, you may add a rotation  While your shoulders and chest are lifted off the ground, turn slightly to the left and hold  Repeat on the other side  · Bird dog:  Place your hands and knees on the floor  Keep your wrists directly below your shoulders and your knees directly below your hips  Pull your belly button in toward your spine   Do not flatten or arch your back  Tighten your abdominal muscles  Raise one arm straight out so that it is aligned with your head  Next, raise the leg opposite your arm  Hold this position for 15 seconds  Lower your arm and leg slowly and change sides  Do 5 sets  © Copyright [address] Information is for End User's use only and may not be sold, redistributed or otherwise used for commercial purposes  All illustrations and images included in CareNotes® are the copyrighted property of A D A M , Inc  or [facility]   The above information is an  only  It is not intended as medical advice for individual conditions or treatments  Talk to your doctor, nurse or pharmacist before following any medical regimen to see if it is safe and effective for you

## 2021-03-10 NOTE — ASSESSMENT & PLAN NOTE
Discussed possibility of physical therapy or chiropractor in the future  She would like to hold off until her current coverage begins  Recommend she continue with ibuprofen and rotate with Tylenol at this time  Discussed that there is not all lot more options while she is breastfeeding

## 2021-07-17 ENCOUNTER — IMMUNIZATIONS (OUTPATIENT)
Dept: FAMILY MEDICINE CLINIC | Facility: HOSPITAL | Age: 36
End: 2021-07-17

## 2021-07-17 DIAGNOSIS — Z23 ENCOUNTER FOR IMMUNIZATION: Primary | ICD-10-CM

## 2021-07-17 PROCEDURE — 91300 SARS-COV-2 / COVID-19 MRNA VACCINE (PFIZER-BIONTECH) 30 MCG: CPT

## 2021-07-17 PROCEDURE — 0001A SARS-COV-2 / COVID-19 MRNA VACCINE (PFIZER-BIONTECH) 30 MCG: CPT

## 2021-08-07 ENCOUNTER — IMMUNIZATIONS (OUTPATIENT)
Dept: FAMILY MEDICINE CLINIC | Facility: HOSPITAL | Age: 36
End: 2021-08-07

## 2021-08-07 DIAGNOSIS — Z23 ENCOUNTER FOR IMMUNIZATION: Primary | ICD-10-CM

## 2021-08-07 PROCEDURE — 91300 SARS-COV-2 / COVID-19 MRNA VACCINE (PFIZER-BIONTECH) 30 MCG: CPT

## 2021-08-07 PROCEDURE — 0002A SARS-COV-2 / COVID-19 MRNA VACCINE (PFIZER-BIONTECH) 30 MCG: CPT

## 2022-06-08 ENCOUNTER — OFFICE VISIT (OUTPATIENT)
Dept: FAMILY MEDICINE CLINIC | Facility: CLINIC | Age: 37
End: 2022-06-08

## 2022-06-08 VITALS
HEART RATE: 86 BPM | HEIGHT: 63 IN | BODY MASS INDEX: 31.54 KG/M2 | WEIGHT: 178 LBS | RESPIRATION RATE: 18 BRPM | TEMPERATURE: 98.4 F | SYSTOLIC BLOOD PRESSURE: 122 MMHG | DIASTOLIC BLOOD PRESSURE: 76 MMHG | OXYGEN SATURATION: 97 %

## 2022-06-08 DIAGNOSIS — J30.9 ALLERGIC RHINITIS, UNSPECIFIED SEASONALITY, UNSPECIFIED TRIGGER: ICD-10-CM

## 2022-06-08 DIAGNOSIS — B34.9 VIRAL INFECTION, UNSPECIFIED: ICD-10-CM

## 2022-06-08 DIAGNOSIS — Z13.31 DEPRESSION SCREEN: ICD-10-CM

## 2022-06-08 DIAGNOSIS — U07.1 2019 NOVEL CORONAVIRUS DISEASE (COVID-19): Primary | ICD-10-CM

## 2022-06-08 LAB
SARS-COV-2 AG UPPER RESP QL IA: POSITIVE
VALID CONTROL: ABNORMAL

## 2022-06-08 PROCEDURE — 87811 SARS-COV-2 COVID19 W/OPTIC: CPT | Performed by: PHYSICIAN ASSISTANT

## 2022-06-08 PROCEDURE — 99213 OFFICE O/P EST LOW 20 MIN: CPT | Performed by: PHYSICIAN ASSISTANT

## 2022-06-08 RX ORDER — LORATADINE 10 MG/1
10 TABLET ORAL DAILY
Qty: 90 TABLET | Refills: 1 | Status: SHIPPED | OUTPATIENT
Start: 2022-06-08

## 2022-06-08 RX ORDER — FLUTICASONE PROPIONATE 50 MCG
1 SPRAY, SUSPENSION (ML) NASAL DAILY
Qty: 16 G | Refills: 1 | Status: SHIPPED | OUTPATIENT
Start: 2022-06-08

## 2022-06-08 NOTE — PROGRESS NOTES
COVID-19 Outpatient Progress Note    Assessment/Plan:    Problem List Items Addressed This Visit    None     Visit Diagnoses     2019 novel coronavirus disease (COVID-19)    -  Primary    Viral infection, unspecified        Relevant Orders    Poct Covid 19 Rapid Antigen Test (Completed)    Allergic rhinitis, unspecified seasonality, unspecified trigger        Relevant Medications    loratadine (Claritin) 10 mg tablet    fluticasone (FLONASE) 50 mcg/act nasal spray    Depression screen             Disposition:     Rapid antigen testing was performed and the result is POSITIVE for COVID-19  Patient has COVID-19 infection  Based off CDC guidelines, they were recommended to isolate for 5 days from the date of the positive test  If they remain asymptomatic, isolation may be ended followed by 5 days of wearing a mask when around othes to minimize risk of infecting others  If they have a fever, continue to stay home until fever resolves for at least 24 hours  - Will prescribe Claritin 10 mg daily and Flonase nasal spray daily for allergy symptoms     - Continue to alternate between taking ibuprofen and Tylenol as needed for body aches/fevers  - Continue using humidifier   - Advised patient to continue to follow good infection control measures including frequent hand washing, wearing a mask when around others, and to practice social distancing     - Advised patient to call the office or report to ED if symptoms persist, worsen, or new symptoms arise such as worsening shortness of breath or difficulty breathing  I have spent 13 minutes directly with the patient  Greater than 50% of this time was spent in counseling/coordination of care regarding: patient and family education and risk factor reductions        Encounter provider Munira Mack PA-C    Provider located at [address] [address] [phone]    Recent Visits  No visits were found meeting these conditions  Showing recent visits within past 7 days and meeting all other requirements  Today's Visits  Date Type Provider Dept   06/08/22 Office Visit LUIS ALBERTO José   Showing today's visits and meeting all other requirements  Future Appointments  No visits were found meeting these conditions  Showing future appointments within next 150 days and meeting all other requirements     Subjective:   Yeyo Aragon is a 39 y o  female who is concerned about COVID-19  Patient's symptoms include fever, chills, fatigue, malaise, nasal congestion, rhinorrhea, diarrhea and myalgias  Patient denies sore throat, anosmia, loss of taste, cough, shortness of breath, chest tightness, abdominal pain, nausea, vomiting and headaches  - Date of symptom onset: 6/6/2022      COVID-19 vaccination status: Fully vaccinated (primary series)    Exposure:   Contact with a person who is under investigation (PUI) for or who is positive for COVID-19 within the last 14 days?: No    Hospitalized recently for fever and/or lower respiratory symptoms?: No      Currently a healthcare worker that is involved in direct patient care?: Yes      Works in a special setting where the risk of COVID-19 transmission may be high? (this may include long-term care, correctional and care home facilities; homeless shelters; assisted-living facilities and group homes ): No      Resident in a special setting where the risk of COVID-19 transmission may be high? (this may include long-term care, correctional and care home facilities; homeless shelters; assisted-living facilities and group homes ): No      Patient notes she moved to a new house about 1 month ago and since then she has been experiencing intermittent allergy symptoms including nasal congestion, runny nose, itchy throat, watery eyes and sneezing    However, patient notes for the past 2 days she has been experiencing body aches and pains, weakness, fevers, chills, fatigue  Patient notes she did have COVID-19 in 2021  Lab Results   Component Value Date    SARSCOVAG Positive (A) 2022     Past Medical History:   Diagnosis Date    Anemia     iron def   anemia    Fibroid     Gestational diabetes mellitus (GDM) in first trimester 2019    Pre-eclampsia, delivered, with postpartum complication     Varicella      Past Surgical History:   Procedure Laterality Date     SECTION      MYOMECTOMY      DC  DELIVERY ONLY N/A 2020    Procedure:  SECTION () REPEAT;  Surgeon: Lele Owens MD;  [facility]: [facility];  Service: Obstetrics     Current Outpatient Medications   Medication Sig Dispense Refill    fluticasone (FLONASE) 50 mcg/act nasal spray 1 spray into each nostril daily 16 g 1    loratadine (Claritin) 10 mg tablet Take 1 tablet (10 mg total) by mouth daily 90 tablet 1    acetaminophen (TYLENOL) 325 mg tablet Take 2 tablets (650 mg total) by mouth every 6 (six) hours (Patient not taking: Reported on 2020) 30 tablet 0    baclofen 10 mg tablet Take 1 tablet (10 mg total) by mouth 2 (two) times a day as needed for muscle spasms (Patient not taking: Reported on 2020) 20 tablet 0    Butalbital-APAP-Caffeine (Fioricet) -40 MG CAPS Take 1 tablet by mouth 4 (four) times a day as needed (headache) (Patient not taking: Reported on 2020) 15 capsule 1    Cholecalciferol (VITAMIN D) 50 MCG (2000 UT) CAPS Take by mouth      docusate sodium (COLACE) 100 mg capsule Take 100 mg by mouth as needed for constipation      ferrous sulfate 324 (65 Fe) mg Take 1 tablet (324 mg total) by mouth 2 (two) times a day before meals 60 tablet 1    ibuprofen (MOTRIN) 600 mg tablet Take 1 tablet (600 mg total) by mouth every 6 (six) hours as needed for moderate pain 50 tablet 1    ibuprofen (MOTRIN) 600 mg tablet Take 1 tablet (600 mg total) by mouth every 6 (six) hours as needed for mild pain 30 tablet 0  Insulin Pen Needle 31G X 5 MM MISC Inject under the skin daily at bedtime Use one a day or as directed  100 each 1    NIFEdipine ER (ADALAT CC) 30 MG 24 hr tablet Take 1 tablet (30 mg total) by mouth daily (Patient not taking: Reported on 3/10/2021) 30 tablet 2    norethindrone (MICRONOR) 0 35 MG tablet Take 1 tablet (0 35 mg total) by mouth daily (Patient not taking: Reported on 3/10/2021) 90 tablet 3    OneTouch Delica Lancets 23J MISC Use 4 a day or as recommended  (Patient not taking: Reported on 6/2/2020) 100 each 6    Prenatal Vit-Fe Fumarate-FA (PRENATAL 1 PLUS 1 PO) Take 1 tablet by mouth daily       No current facility-administered medications for this visit  No Known Allergies    Review of Systems   Constitutional: Positive for chills, fatigue and fever  HENT: Positive for congestion and rhinorrhea  Negative for sore throat  Respiratory: Negative for cough, chest tightness and shortness of breath  Gastrointestinal: Positive for diarrhea  Negative for abdominal pain, nausea and vomiting  Musculoskeletal: Positive for myalgias  Neurological: Negative for headaches  Objective:    Vitals:    06/08/22 0823   BP: 122/76   BP Location: Left arm   Patient Position: Sitting   Cuff Size: Standard   Pulse: 86   Resp: 18   Temp: 98 4 °F (36 9 °C)   TempSrc: Temporal   SpO2: 97%   Weight: 80 7 kg (178 lb)   Height: 5' 3" (1 6 m)       Physical Exam  Vitals and nursing note reviewed  Constitutional:       General: She is not in acute distress  Appearance: She is well-developed  HENT:      Head: Normocephalic and atraumatic  Right Ear: Tympanic membrane and external ear normal       Left Ear: Tympanic membrane and external ear normal       Nose: Congestion present  Mouth/Throat:      Pharynx: Uvula midline  Eyes:      Conjunctiva/sclera: Conjunctivae normal    Cardiovascular:      Rate and Rhythm: Normal rate and regular rhythm  Pulses: Normal pulses        Heart sounds: No murmur heard  Pulmonary:      Effort: Pulmonary effort is normal  No respiratory distress  Breath sounds: Normal breath sounds  No wheezing  Musculoskeletal:      Cervical back: Normal range of motion and neck supple  Neurological:      Mental Status: She is alert and oriented to person, place, and time  Deep Tendon Reflexes: Reflexes are normal and symmetric  Psychiatric:         Speech: Speech normal          Behavior: Behavior normal           PHQ-2/9 Depression Screening    Little interest or pleasure in doing things: 0 - not at all  Feeling down, depressed, or hopeless: 0 - not at all  PHQ-2 Score: 0  PHQ-2 Interpretation: Negative depression screen         VIRTUAL VISIT DISCLAIMER    Laly Ran Mcduffie 81 verbally agrees to participate in [employer]  Pt is aware that [employer] could be limited without vital signs or the ability to perform a full hands-on physical [address] understands she or the provider may request at any time to terminate the video visit and request the patient to seek care or treatment in person

## 2022-06-08 NOTE — LETTER
June 8, 2022     Patient: Linda Turner  YOB: 1985  Date of Visit: 6/8/2022      To Whom it May Concern:    John Paul Del Cid is under my professional care  Laly was seen in my office on 6/8/2022  Laly may return to work on 6/14/22  Patient has tested positive for COVID-19  If you have any questions or concerns, please don't hesitate to call           Sincerely,          Ryann Greene PA-C        CC: No Recipients

## 2022-07-06 ENCOUNTER — APPOINTMENT (OUTPATIENT)
Dept: LAB | Facility: HOSPITAL | Age: 37
End: 2022-07-06

## 2022-07-06 DIAGNOSIS — Z00.8 ENCOUNTER FOR OTHER GENERAL EXAMINATION: ICD-10-CM

## 2022-07-06 LAB
CHOLEST SERPL-MCNC: 216 MG/DL
EST. AVERAGE GLUCOSE BLD GHB EST-MCNC: 117 MG/DL
HBA1C MFR BLD: 5.7 %
HDLC SERPL-MCNC: 41 MG/DL
LDLC SERPL CALC-MCNC: 161 MG/DL
NONHDLC SERPL-MCNC: 175 MG/DL
TRIGL SERPL-MCNC: 68 MG/DL

## 2022-07-06 PROCEDURE — 36415 COLL VENOUS BLD VENIPUNCTURE: CPT

## 2022-07-06 PROCEDURE — 80061 LIPID PANEL: CPT

## 2022-07-06 PROCEDURE — 83036 HEMOGLOBIN GLYCOSYLATED A1C: CPT

## 2023-01-17 ENCOUNTER — OFFICE VISIT (OUTPATIENT)
Dept: FAMILY MEDICINE CLINIC | Facility: CLINIC | Age: 38
End: 2023-01-17

## 2023-01-17 VITALS
HEART RATE: 75 BPM | OXYGEN SATURATION: 99 % | WEIGHT: 149.4 LBS | DIASTOLIC BLOOD PRESSURE: 72 MMHG | TEMPERATURE: 96.1 F | BODY MASS INDEX: 26.47 KG/M2 | RESPIRATION RATE: 16 BRPM | SYSTOLIC BLOOD PRESSURE: 112 MMHG | HEIGHT: 63 IN

## 2023-01-17 DIAGNOSIS — Z86.32 HISTORY OF GESTATIONAL DIABETES: ICD-10-CM

## 2023-01-17 DIAGNOSIS — Z13.29 THYROID DISORDER SCREENING: ICD-10-CM

## 2023-01-17 DIAGNOSIS — Z00.00 ANNUAL PHYSICAL EXAM: Primary | ICD-10-CM

## 2023-01-17 DIAGNOSIS — H93.A2 PULSATILE TINNITUS OF LEFT EAR: ICD-10-CM

## 2023-01-17 DIAGNOSIS — Z13.220 LIPID SCREENING: ICD-10-CM

## 2023-01-17 NOTE — PATIENT INSTRUCTIONS

## 2023-01-17 NOTE — PROGRESS NOTES
ADULT ANNUAL PHYSICAL  [address] [facility] PRIMARY CARE [facility]    NAME: Janel Christianson  AGE: 44 y o  SEX: female  : 1985     DATE: 2023     Assessment and Plan:     Problem List Items Addressed This Visit    None  Visit Diagnoses     Annual physical exam    -  Primary    Pulsatile tinnitus of left ear        Relevant Orders    MRI brain wo mra head and neck wo    History of gestational diabetes        Relevant Orders    Hemoglobin A1C    CBC and differential    Comprehensive metabolic panel    UA (URINE) with reflex to Scope    Lipid screening        Relevant Orders    Lipid panel    Thyroid disorder screening        Relevant Orders    TSH, 3rd generation      See discussion above    Immunizations and preventive care screenings were discussed with patient today  Appropriate education was printed on patient's after visit summary  Counseling:  · Patient is here to establish care  She has been experiencing left ear tinnitus for 2 months now  Is present all the time but is more prominent when she is laying on the left side  She used to have headaches which have become better  She had preeclampsia with her last child  She was on blood pressure medication for a while but is currently off of it and her blood pressure is fine  Denies any vision changes  Denies any ear pain  No follow-ups on file  Chief Complaint:     Chief Complaint   Patient presents with   • Establish Care   • Physical Exam     Pt c/o ringing on left ear  Pt c/o not feeling emotionally after loosing mom  BMI follow up due  History of Present Illness:     Adult Annual Physical   Patient here for a comprehensive physical exam  The patient reports problems - As above  Diet and Physical Activity  · Diet/Nutrition: well balanced diet  · Exercise: walking        Depression Screening  PHQ-2/9 Depression Screening    Little interest or pleasure in doing things: 2 - more than half the days  Feeling down, depressed, or hopeless: 2 - more than half the days  Trouble falling or staying asleep, or sleeping too much: 2 - more than half the days  Feeling tired or having little energy: 2 - more than half the days  Poor appetite or overeatin - several days  Feeling bad about yourself - or that you are a failure or have let yourself or your family down: 0 - not at all  Trouble concentrating on things, such as reading the newspaper or watching television: 1 - several days  Moving or speaking so slowly that other people could have noticed  Or the opposite - being so fidgety or restless that you have been moving around a lot more than usual: 0 - not at all  Thoughts that you would be better off dead, or of hurting yourself in some way: 0 - not at all  PHQ-2 Score: 4  PHQ-2 Interpretation: POSITIVE depression screen  PHQ-9 Score: 10   PHQ-9 Interpretation: Moderate depression        General Health  · Sleep: sleeps well  · Hearing: normal - bilateral   · Vision: no vision problems  · Dental: brushes teeth twice daily  /GYN Health  · Last menstrual period: Being followed by GYN  · Contraceptive method: As above  · History of STDs?: no      Review of Systems:     Review of Systems   Past Medical History:     Past Medical History:   Diagnosis Date   • Anemia     iron def   anemia   • Fibroid    • Gestational diabetes mellitus (GDM) in first trimester 2019   • Pre-eclampsia, delivered, with postpartum complication 50   • Varicella       Past Surgical History:     Past Surgical History:   Procedure Laterality Date   •  SECTION     • MYOMECTOMY     • IN  DELIVERY ONLY N/A 2020    Procedure:  SECTION () REPEAT;  Surgeon: Arnold Hope MD;  Location: [facility];  Service: Obstetrics      Social History:     Social History     Socioeconomic History   • Marital status: Legally      Spouse name: None   • Number of children: None • Years of education: None   • Highest education level: None   Occupational History   • None   Tobacco Use   • Smoking status: Never   • Smokeless tobacco: Never   Vaping Use   • Vaping Use: Never used   Substance and Sexual Activity   • Alcohol use: Not Currently   • Drug use: Never   • Sexual activity: Not Currently     Partners: Male     Birth control/protection: None   Other Topics Concern   • None   Social History Narrative   • None     Social Determinants of Health     Financial Resource Strain: Not on file   Food Insecurity: Not on file   Transportation Needs: Not on file   Physical Activity: Not on file   Stress: Not on file   Social Connections: Not on file   Intimate Partner Violence: Not on file   Housing Stability: Not on file      Family History:     Family History   Problem Relation Age of Onset   • Hypertension Mother    • Diabetes Mother    • Hypertension Father    • Hypertension Sister    • No Known Problems Sister    • No Known Problems Brother    • No Known Problems Brother    • Autism Daughter    • Dementia Maternal Grandmother    • Diabetes Paternal Grandfather       Current Medications:     Current Outpatient Medications   Medication Sig Dispense Refill   • Cholecalciferol (VITAMIN D) 50 MCG (2000 UT) CAPS Take by mouth     • docusate sodium (COLACE) 100 mg capsule Take 100 mg by mouth as needed for constipation     • fluticasone (FLONASE) 50 mcg/act nasal spray 1 spray into each nostril daily 16 g 1   • ibuprofen (MOTRIN) 600 mg tablet Take 1 tablet (600 mg total) by mouth every 6 (six) hours as needed for moderate pain 50 tablet 1   • loratadine (Claritin) 10 mg tablet Take 1 tablet (10 mg total) by mouth daily 90 tablet 1   • acetaminophen (TYLENOL) 325 mg tablet Take 2 tablets (650 mg total) by mouth every 6 (six) hours (Patient not taking: Reported on 1/17/2023) 30 tablet 0   • baclofen 10 mg tablet Take 1 tablet (10 mg total) by mouth 2 (two) times a day as needed for muscle spasms (Patient not taking: Reported on 7/6/2020) 20 tablet 0   • Butalbital-APAP-Caffeine (Fioricet) -40 MG CAPS Take 1 tablet by mouth 4 (four) times a day as needed (headache) (Patient not taking: Reported on 6/24/2020) 15 capsule 1   • ferrous sulfate 324 (65 Fe) mg Take 1 tablet (324 mg total) by mouth 2 (two) times a day before meals 60 tablet 1   • ibuprofen (MOTRIN) 600 mg tablet Take 1 tablet (600 mg total) by mouth every 6 (six) hours as needed for mild pain (Patient not taking: Reported on 1/17/2023) 30 tablet 0   • Insulin Pen Needle 31G X 5 MM MISC Inject under the skin daily at bedtime Use one a day or as directed  100 each 1   • NIFEdipine ER (ADALAT CC) 30 MG 24 hr tablet Take 1 tablet (30 mg total) by mouth daily (Patient not taking: Reported on 3/10/2021) 30 tablet 2   • norethindrone (MICRONOR) 0 35 MG tablet Take 1 tablet (0 35 mg total) by mouth daily (Patient not taking: Reported on 3/10/2021) 90 tablet 3   • OneTouch Delica Lancets 37R MISC Use 4 a day or as recommended  (Patient not taking: Reported on 6/2/2020) 100 each 6   • Prenatal Vit-Fe Fumarate-FA (PRENATAL 1 PLUS 1 PO) Take 1 tablet by mouth daily (Patient not taking: Reported on 1/17/2023)       No current facility-administered medications for this visit  Allergies:     No Known Allergies   Physical Exam:     /72 (BP Location: Left arm, Patient Position: Sitting, Cuff Size: Standard)   Pulse 75   Temp (!) 96 1 °F (35 6 °C) (Tympanic)   Resp 16   Ht 5' 3" (1 6 m)   Wt 67 8 kg (149 lb 6 4 oz)   SpO2 99%   BMI 26 47 kg/m²     Physical Exam  Vitals and nursing note reviewed  Constitutional:       General: She is not in acute distress  Appearance: She is well-developed  HENT:      Head: Normocephalic and atraumatic  Eyes:      Conjunctiva/sclera: Conjunctivae normal    Neck:      Vascular: No carotid bruit        Comments: Mild prominence of thyroid  Cardiovascular:      Rate and Rhythm: Normal rate and regular rhythm  Heart sounds: Normal heart sounds  No murmur heard  Pulmonary:      Effort: Pulmonary effort is normal  No respiratory distress  Breath sounds: Normal breath sounds  Abdominal:      General: There is no distension  Palpations: Abdomen is soft  Tenderness: There is no abdominal tenderness  There is no guarding  Musculoskeletal:         General: No swelling or tenderness  Cervical back: Neck supple  Lymphadenopathy:      Cervical: No cervical adenopathy  Skin:     General: Skin is warm and dry  Capillary Refill: Capillary refill takes less than 2 seconds  Neurological:      Mental Status: She is alert     Psychiatric:      Comments: Sad talking about her mom's passing away          Mckenna Del Rio MD   [address]

## 2023-01-26 ENCOUNTER — TELEPHONE (OUTPATIENT)
Dept: FAMILY MEDICINE CLINIC | Facility: CLINIC | Age: 38
End: 2023-01-26

## 2023-01-26 ENCOUNTER — APPOINTMENT (OUTPATIENT)
Dept: LAB | Facility: HOSPITAL | Age: 38
End: 2023-01-26

## 2023-01-26 ENCOUNTER — HOSPITAL ENCOUNTER (OUTPATIENT)
Dept: MRI IMAGING | Facility: HOSPITAL | Age: 38
Discharge: HOME | End: 2023-01-26

## 2023-01-26 DIAGNOSIS — H93.A2 PULSATILE TINNITUS OF LEFT EAR: ICD-10-CM

## 2023-01-26 LAB
ALBUMIN SERPL BCP-MCNC: 4.3 G/DL (ref 3.5–5)
ALP SERPL-CCNC: 52 U/L (ref 34–104)
ALT SERPL W P-5'-P-CCNC: 10 U/L (ref 7–52)
ANION GAP SERPL CALCULATED.3IONS-SCNC: 6 MMOL/L (ref 4–13)
AST SERPL W P-5'-P-CCNC: 18 U/L (ref 13–39)
BACTERIA UR QL AUTO: ABNORMAL /HPF
BASOPHILS # BLD AUTO: 0.01 THOUSANDS/ÂΜL (ref 0–0.1)
BASOPHILS NFR BLD AUTO: 0 % (ref 0–1)
BILIRUB SERPL-MCNC: 0.46 MG/DL (ref 0.2–1)
BILIRUB UR QL STRIP: NEGATIVE
BUN SERPL-MCNC: 13 MG/DL (ref 5–25)
CALCIUM SERPL-MCNC: 9.5 MG/DL (ref 8.4–10.2)
CHLORIDE SERPL-SCNC: 102 MMOL/L (ref 96–108)
CHOLEST SERPL-MCNC: 194 MG/DL
CLARITY UR: CLEAR
CO2 SERPL-SCNC: 27 MMOL/L (ref 21–32)
COLOR UR: YELLOW
CREAT SERPL-MCNC: 0.82 MG/DL (ref 0.6–1.3)
EOSINOPHIL # BLD AUTO: 0.1 THOUSAND/ÂΜL (ref 0–0.61)
EOSINOPHIL NFR BLD AUTO: 2 % (ref 0–6)
ERYTHROCYTE [DISTWIDTH] IN BLOOD BY AUTOMATED COUNT: 15.9 % (ref 11.6–15.1)
EST. AVERAGE GLUCOSE BLD GHB EST-MCNC: 100 MG/DL
GFR SERPL CREATININE-BSD FRML MDRD: 91 ML/MIN/1.73SQ M
GLUCOSE P FAST SERPL-MCNC: 78 MG/DL (ref 65–99)
GLUCOSE UR STRIP-MCNC: NEGATIVE MG/DL
HBA1C MFR BLD: 5.1 %
HCT VFR BLD AUTO: 31.6 % (ref 34.8–46.1)
HDLC SERPL-MCNC: 48 MG/DL
HGB BLD-MCNC: 9.8 G/DL (ref 11.5–15.4)
HGB UR QL STRIP.AUTO: ABNORMAL
IMM GRANULOCYTES # BLD AUTO: 0.02 THOUSAND/UL (ref 0–0.2)
IMM GRANULOCYTES NFR BLD AUTO: 0 % (ref 0–2)
KETONES UR STRIP-MCNC: NEGATIVE MG/DL
LDLC SERPL CALC-MCNC: 135 MG/DL (ref 0–100)
LEUKOCYTE ESTERASE UR QL STRIP: NEGATIVE
LYMPHOCYTES # BLD AUTO: 4.33 THOUSANDS/ÂΜL (ref 0.6–4.47)
LYMPHOCYTES NFR BLD AUTO: 63 % (ref 14–44)
MCH RBC QN AUTO: 24.4 PG (ref 26.8–34.3)
MCHC RBC AUTO-ENTMCNC: 31 G/DL (ref 31.4–37.4)
MCV RBC AUTO: 79 FL (ref 82–98)
MONOCYTES # BLD AUTO: 0.54 THOUSAND/ÂΜL (ref 0.17–1.22)
MONOCYTES NFR BLD AUTO: 8 % (ref 4–12)
MUCOUS THREADS UR QL AUTO: ABNORMAL
NEUTROPHILS # BLD AUTO: 1.81 THOUSANDS/ÂΜL (ref 1.85–7.62)
NEUTS SEG NFR BLD AUTO: 27 % (ref 43–75)
NITRITE UR QL STRIP: NEGATIVE
NON-SQ EPI CELLS URNS QL MICRO: ABNORMAL /HPF
NONHDLC SERPL-MCNC: 146 MG/DL
NRBC BLD AUTO-RTO: 0 /100 WBCS
PH UR STRIP.AUTO: 6 [PH]
PLATELET # BLD AUTO: 426 THOUSANDS/UL (ref 149–390)
PMV BLD AUTO: 9.8 FL (ref 8.9–12.7)
POTASSIUM SERPL-SCNC: 3.4 MMOL/L (ref 3.5–5.3)
PROT SERPL-MCNC: 7.7 G/DL (ref 6.4–8.4)
PROT UR STRIP-MCNC: NEGATIVE MG/DL
RBC # BLD AUTO: 4.01 MILLION/UL (ref 3.81–5.12)
RBC #/AREA URNS AUTO: ABNORMAL /HPF
SODIUM SERPL-SCNC: 135 MMOL/L (ref 135–147)
SP GR UR STRIP.AUTO: 1.01 (ref 1–1.03)
TRIGL SERPL-MCNC: 55 MG/DL
TSH SERPL DL<=0.05 MIU/L-ACNC: 1.98 UIU/ML (ref 0.45–4.5)
UROBILINOGEN UR QL STRIP.AUTO: 0.2 E.U./DL
WBC # BLD AUTO: 6.81 THOUSAND/UL (ref 4.31–10.16)
WBC #/AREA URNS AUTO: ABNORMAL /HPF

## 2023-01-26 NOTE — TELEPHONE ENCOUNTER
----- Message from Darien Hernandez MD sent at 1/26/2023 12:31 PM EST -----  please have patient follow-up to discuss abnormal labs

## 2023-01-27 ENCOUNTER — OFFICE VISIT (OUTPATIENT)
Dept: FAMILY MEDICINE CLINIC | Facility: CLINIC | Age: 38
End: 2023-01-27

## 2023-01-27 VITALS
OXYGEN SATURATION: 99 % | RESPIRATION RATE: 16 BRPM | HEART RATE: 80 BPM | TEMPERATURE: 96.5 F | WEIGHT: 142.4 LBS | SYSTOLIC BLOOD PRESSURE: 118 MMHG | HEIGHT: 63 IN | BODY MASS INDEX: 25.23 KG/M2 | DIASTOLIC BLOOD PRESSURE: 86 MMHG

## 2023-01-27 DIAGNOSIS — D50.9 IRON DEFICIENCY ANEMIA, UNSPECIFIED IRON DEFICIENCY ANEMIA TYPE: Primary | ICD-10-CM

## 2023-01-27 DIAGNOSIS — N92.0 MENORRHAGIA WITH REGULAR CYCLE: ICD-10-CM

## 2023-01-27 DIAGNOSIS — E87.6 HYPOKALEMIA: ICD-10-CM

## 2023-01-27 RX ORDER — MULTIVIT WITH MINERALS/LUTEIN
TABLET ORAL
Qty: 60 TABLET | Refills: 1 | Status: SHIPPED | OUTPATIENT
Start: 2023-01-27

## 2023-01-27 RX ORDER — FERROUS SULFATE TAB EC 324 MG (65 MG FE EQUIVALENT) 324 (65 FE) MG
TABLET DELAYED RESPONSE ORAL
Qty: 60 TABLET | Refills: 1 | Status: SHIPPED | OUTPATIENT
Start: 2023-01-27

## 2023-01-27 NOTE — PROGRESS NOTES
Assessment/Plan:           Problem List Items Addressed This Visit        Other    Anemia - Primary    Relevant Medications    ferrous sulfate 324 (65 Fe) mg    ascorbic acid (VITAMIN C) 250 mg tablet    Other Relevant Orders    Iron Panel (Includes Ferritin, Iron Sat%, Iron, and TIBC)    Occult Blood, Fecal Immunochemical   Other Visit Diagnoses     Hypokalemia        Menorrhagia with regular cycle                Subjective:      Patient ID: Sage Somers is a 44 y o  female  HPI  Is here to follow-up recent lab studies  Patient was noted to have low hemoglobin of 9 9 microcytic with thrombocytosis  The later is reactive  Iron studies would be ordered  Patient has been having heavy menstrual cycle  I would do lab studies as well as do a stool test patient will be started on supplementation  She was also noted to have mild hypokalemia  I encouraged her to increase potassium in diet  [place] lab studies in 3 months  Patient encouraged to follow-up with her gynecologist  The following portions of the patient's history were reviewed and updated as appropriate: allergies, current medications, past family history, past medical history, past social history, past surgical history and problem list     Review of Systems   Constitutional: Positive for fatigue  Objective:      /86 (BP Location: Left arm, Patient Position: Sitting, Cuff Size: Standard)   Pulse 80   Temp (!) 96 5 °F (35 8 °C) (Tympanic)   Resp 16   Ht 5' 3" (1 6 m)   Wt 64 6 kg (142 lb 6 4 oz)   LMP  (LMP Unknown)   SpO2 99%   BMI 25 23 kg/m²          Physical Exam  Cardiovascular:      Rate and Rhythm: Normal rate and regular rhythm  Heart sounds: Normal heart sounds  No murmur heard  Pulmonary:      Effort: Pulmonary effort is normal  No respiratory distress  Breath sounds: Normal breath sounds  No wheezing or rales  Abdominal:      General: There is no distension  Tenderness:  There is no abdominal tenderness  There is no guarding  Neurological:      Mental Status: She is alert

## 2023-01-31 DIAGNOSIS — H93.A2 PULSATILE TINNITUS OF LEFT EAR: Primary | ICD-10-CM

## 2023-03-03 ENCOUNTER — TELEPHONE (OUTPATIENT)
Dept: NEUROLOGY | Facility: CLINIC | Age: 38
End: 2023-03-03

## 2023-03-07 ENCOUNTER — CONSULT (OUTPATIENT)
Dept: NEUROLOGY | Facility: CLINIC | Age: 38
End: 2023-03-07

## 2023-03-07 VITALS
SYSTOLIC BLOOD PRESSURE: 128 MMHG | WEIGHT: 142 LBS | HEIGHT: 63 IN | TEMPERATURE: 98 F | DIASTOLIC BLOOD PRESSURE: 71 MMHG | HEART RATE: 77 BPM | BODY MASS INDEX: 25.16 KG/M2

## 2023-03-07 DIAGNOSIS — H93.A2 PULSATILE TINNITUS OF LEFT EAR: Primary | ICD-10-CM

## 2023-03-07 DIAGNOSIS — R00.2 PALPITATIONS: ICD-10-CM

## 2023-03-07 DIAGNOSIS — M25.50 ARTHRALGIA, UNSPECIFIED JOINT: ICD-10-CM

## 2023-03-07 DIAGNOSIS — G43.009 MIGRAINE WITHOUT AURA AND WITHOUT STATUS MIGRAINOSUS, NOT INTRACTABLE: ICD-10-CM

## 2023-03-07 NOTE — PROGRESS NOTES
Patient ID: Joni Cohen is a 44 y o  female  Assessment/Plan:           Problem List Items Addressed This Visit        Cardiovascular and Mediastinum    Migraine without aura and without status migrainosus, not intractable       Nervous and Auditory    Pulsatile tinnitus of left ear - Primary    Relevant Orders    Sedimentation rate, automated (Completed)    C-reactive protein (Completed)    Sjogren's Antibodies (Completed)    Rheumatoid Factor (IgA, IgG, IgM)    CT VENOGRAM BRAIN    Holter monitor       Other    Palpitations    Arthralgia    Relevant Orders    Sedimentation rate, automated (Completed)    C-reactive protein (Completed)    Sjogren's Antibodies (Completed)    Rheumatoid Factor (IgA, IgG, IgM)      Mrs Reema Baxter has presented to [place] [address] [facility] sclerosis center for evaluation of left-sided pulsatile tinnitus  Patient stated her symptoms started around the time she had significant weight loss as patient had lost 44 pounds over 3 months with an intense exercise program and dietary changes in addition to her regimen which included metformin plus phentermine and insulin  We personally reviewed patient imaging studies, patient has no internal auditory canal pathology, with no signs of inflammation or neoplasm been described; patient does have right sphenoid sinus disease but less likely this would contribute to left-sided pulsatile tinnitus; We discussed potential differential diagnosis including elevated intracranial pressure but patient does not have classic signs of transient visual obscuration with pressure headache; 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 but patient has MRA head done and within normal range; we also discussed cardiac origin of her clinical presentation concerning patient family history of myocardial hypertrophy with Holter monitoring completed in 2013 for palpitation-patient was offered Holter monitoring 48 h-long study;   Patient was also offered CT venogram in the light of her clinical findings as the narrowing of the veins causes a disturbance in the blood flow, contributing to the whooshing sounds of pulsatile tinnitus  We also discussed hearing loss may present initially as a tinnitus and basic serological screening for rheumatological condition will be advised with a strong family history of RA in the patient's mother  I doubt benign positional vertigo/Ménière's disease/PPPD has similar presentation; patient has no signs of nystagmus or disequilibrium on today's exam      Patient is to consider flavonoids to cope with tinnitus  Tinnitus treatment resources:     1  [employer] [address]  com will take you to Dr Cherry Hallman web site  Discussion is 90 minutes long  2  On [email or web address]  [employer] there is a 27-minute discussion about the same topics - CBT and sound therapy  3  Sound therapy at [address] who was recommended by [facility] emergency dept  [email or web address]  net is the lucía with lot of it is free to try  Patient is to follow [facility] neurology after imaging completed and available for my review  Subjective: headaches and tinnitus  Pt here by herself  Pt mention she took for 3 months phentermine, Metformin and insulin inject to lose weight she is not sure if this would be the cause of tinnitus      HPI    Mrs Dinah Pressley is a 50-year-old female who presented to [place] [address] multiple sclerosis center for evaluation of pulsatile tinnitus of left ear  Patient describes she has been experiencing headaches since age of 24, patient usually has 2-4 headaches per week sometimes 6 a month  Patient states her headache is gone the last as long as she is not taking any medication  The majority of her headache intensity is 10/10  Patient describes her headache predominantly in left retro-orbital and temporal region as well as involving parietal occipital area    Type of headache described as throbbing, pulsating, pressure sensation  In addition to headache patient may experience soreness/stiffness in the neck, tearing in the eyes, sensation to the sound  Patient prefers to stay in quiet dark room  Patient stated having stress in recent notes may potentially trigger her headache in addition to fatigue and headache related to sleep dysfunction  Patient was using Advil/Aleve/naproxen to elevate her headache  Patient also described having pulsatile tinnitus involving the left ear where she had completed imaging studies including brain MRI as well as MRA head and neck  Brain imaging were personally reviewed as patient has no acute or chronic ischemic or hemorrhagic changes, patient has chronic right sphenoid sinus disease as noted on CAT scan:  No mass effect, acute intracranial hemorrhage or evidence of recent infarction      Chronic right sphenoid sinus disease as described above and as seen on prior CT from 6/19/2020  MRA Head/neck: No evidence for high-grade stenosis, focal occlusion or vascular aneurysm of the intracranial circulation      Apparent irregularity of the high cervical segment of the right internal carotid artery is not corroborated on concurrent MR angiogram of the neck performed simultaneously is therefore thought to be artifactual in nature  CT angiogram of the neck is   recommended for more definitive characterization  RIGHT CAROTID ARTERY:  Normal common carotid artery, cervical internal carotid artery and external carotid artery  No stenosis at the bifurcation  No evidence for high-grade stenosis or focal occlusion of the cervical vasculature  Patient describes she has been doing reasonably well since she was 44 pounds in 3 months when she was taking 3 regimens metformin, phentermine and tirzepatide  Patient states that she believes she has tachycardia with a heart rate is at 130 beats per minutes which also comes with counseling in the ears    Patient reports having whooshing sounds in her left ear  Patient had Holter monitoring in 2013 for palpitation  Patient has family history of myocardial hypertrophy  Patient has mother with rheumatoid arthritis, family history of nosebleeds  Patient reports no focal weakness, no sensory loss, no vision loss, no double vision, no signs of vertigo, no facial asymmetry, no dysphagia or aphasia or dysarthria  Patient describes she is sleeping up to 4 hours  The following portions of the patient's history were reviewed and updated as appropriate:   She  has a past medical history of Anemia, Fibroid, Gestational diabetes mellitus (GDM) in first trimester (2019), Migraine without aura and without status migrainosus, not intractable (3/12/2023), Pre-eclampsia, delivered, with postpartum complication (9657), and Varicella  She   Patient Active Problem List    Diagnosis Date Noted   • Migraine without aura and without status migrainosus, not intractable 2023   • Palpitations 2023   • Arthralgia 2023   • Pulsatile tinnitus of left ear 2023   • Chronic bilateral low back pain without sciatica 03/10/2021   • Status post repeat low transverse  section 2020   • Prediabetes 2019   • Pregnancy with history of uterine myomectomy 2019   • History of  section 2018   • Headache 2016   • Anemia 2015     She  has a past surgical history that includes Myomectomy;  section; and pr  delivery only (N/A, 2020)  Her family history includes Autism in her daughter; Dementia in her maternal grandmother; Diabetes in her mother and paternal grandfather; Hypertension in her father, mother, and sister; No Known Problems in her brother, brother, and sister  She  reports that she has never smoked  She has never used smokeless tobacco  She reports that she does not currently use alcohol  She reports that she does not use drugs    Current Outpatient Medications   Medication Sig Dispense Refill   • Cholecalciferol (VITAMIN D) 50 MCG (2000 UT) CAPS Take by mouth     • docusate sodium (COLACE) 100 mg capsule Take 100 mg by mouth as needed for constipation     • fluticasone (FLONASE) 50 mcg/act nasal spray 1 spray into each nostril daily 16 g 1   • ibuprofen (MOTRIN) 600 mg tablet Take 1 tablet (600 mg total) by mouth every 6 (six) hours as needed for moderate pain 50 tablet 1   • loratadine (Claritin) 10 mg tablet Take 1 tablet (10 mg total) by mouth daily (Patient taking differently: Take 10 mg by mouth daily PRN) 90 tablet 1   • acetaminophen (TYLENOL) 325 mg tablet Take 2 tablets (650 mg total) by mouth every 6 (six) hours (Patient not taking: Reported on 1/17/2023) 30 tablet 0   • ascorbic acid (VITAMIN C) 250 mg tablet Take vitamin C with iron every other day 60 tablet 0   • baclofen 10 mg tablet Take 1 tablet (10 mg total) by mouth 2 (two) times a day as needed for muscle spasms (Patient not taking: Reported on 7/6/2020) 20 tablet 0   • Butalbital-APAP-Caffeine (Fioricet) -40 MG CAPS Take 1 tablet by mouth 4 (four) times a day as needed (headache) (Patient not taking: Reported on 6/24/2020) 15 capsule 1   • ferrous sulfate 324 (65 Fe) mg 1 pill every other day with vitamin C 60 tablet 0   • Insulin Pen Needle 31G X 5 MM MISC Inject under the skin daily at bedtime Use one a day or as directed  100 each 1   • NIFEdipine ER (ADALAT CC) 30 MG 24 hr tablet Take 1 tablet (30 mg total) by mouth daily (Patient not taking: Reported on 3/10/2021) 30 tablet 2   • norethindrone (MICRONOR) 0 35 MG tablet Take 1 tablet (0 35 mg total) by mouth daily (Patient not taking: Reported on 3/10/2021) 90 tablet 3   • OneTouch Delica Lancets 57W MISC Use 4 a day or as recommended   (Patient not taking: Reported on 6/2/2020) 100 each 6   • Prenatal Vit-Fe Fumarate-FA (PRENATAL 1 PLUS 1 PO) Take 1 tablet by mouth daily (Patient not taking: Reported on 1/17/2023)       No current facility-administered medications for this visit  Current Outpatient Medications on File Prior to Visit   Medication Sig   • Cholecalciferol (VITAMIN D) 50 MCG (2000 UT) CAPS Take by mouth   • docusate sodium (COLACE) 100 mg capsule Take 100 mg by mouth as needed for constipation   • fluticasone (FLONASE) 50 mcg/act nasal spray 1 spray into each nostril daily   • ibuprofen (MOTRIN) 600 mg tablet Take 1 tablet (600 mg total) by mouth every 6 (six) hours as needed for moderate pain   • loratadine (Claritin) 10 mg tablet Take 1 tablet (10 mg total) by mouth daily (Patient taking differently: Take 10 mg by mouth daily PRN)   • acetaminophen (TYLENOL) 325 mg tablet Take 2 tablets (650 mg total) by mouth every 6 (six) hours (Patient not taking: Reported on 1/17/2023)   • baclofen 10 mg tablet Take 1 tablet (10 mg total) by mouth 2 (two) times a day as needed for muscle spasms (Patient not taking: Reported on 7/6/2020)   • Butalbital-APAP-Caffeine (Fioricet) -40 MG CAPS Take 1 tablet by mouth 4 (four) times a day as needed (headache) (Patient not taking: Reported on 6/24/2020)   • Insulin Pen Needle 31G X 5 MM MISC Inject under the skin daily at bedtime Use one a day or as directed  • NIFEdipine ER (ADALAT CC) 30 MG 24 hr tablet Take 1 tablet (30 mg total) by mouth daily (Patient not taking: Reported on 3/10/2021)   • norethindrone (MICRONOR) 0 35 MG tablet Take 1 tablet (0 35 mg total) by mouth daily (Patient not taking: Reported on 3/10/2021)   • OneTouch Delica Lancets 34V MISC Use 4 a day or as recommended  (Patient not taking: Reported on 6/2/2020)   • Prenatal Vit-Fe Fumarate-FA (PRENATAL 1 PLUS 1 PO) Take 1 tablet by mouth daily (Patient not taking: Reported on 1/17/2023)     No current facility-administered medications on file prior to visit  She has No Known Allergies            Objective:    Blood pressure 128/71, pulse 77, temperature 98 °F (36 7 °C), temperature source Skin, height 5' 3" (1 6 m), weight 64 4 kg (142 lb), currently breastfeeding  Physical Exam    Neurological Exam  CONSTITUTIONAL: NAD, pleasant  NECK: supple, no lymphadenopathy, no thyromegaly, no JVD  CARDIOVASCULAR: RRR, normal S1S2, no murmurs, no rubs  RESP: clear to auscultation bilaterally, no wheezes/rhonchi/rales  ABDOMEN: soft, non tender, non distended  SKIN: no rash or skin lesions  EXTREMITIES: no edema, pulses 2+bilaterally  PSYCH: appropriate mood and affect  NEUROLOGIC COMPREHENSIVE EXAM: Patient is oriented to person, place and time, NAD; appropriate affect  CN II, III, IV, V, VI, VII,VIII,IX,X,XI-XII intact with EOMI, PERRLA, OKN intact, VF grossly intact, fundi poorly visualized secondary to pupillary constriction; symmetric face noted  Motor: 5/5 UE/LE bilateral symmetric; Sensory: intact to light touch and pinprick bilaterally; normal vibration sensation feet bilaterally; Coordination within normal limits on FTN and LAURI testing; DTR: 2/4 through, no Babinski, no clonus  Tandem gait is intact  Romberg: absent  ROS:  12 points of review of system was reviewed with the patient and was unremarkable with exception: see HPI  Review of Systems   Constitutional: Negative  Negative for appetite change and fever  HENT: Positive for tinnitus  Negative for hearing loss, trouble swallowing and voice change  Eyes: Negative  Negative for photophobia, pain and visual disturbance  Respiratory: Negative  Negative for shortness of breath  Cardiovascular: Negative  Negative for palpitations  Gastrointestinal: Negative  Negative for nausea and vomiting  Endocrine: Negative  Negative for cold intolerance  Genitourinary: Negative  Negative for dysuria, frequency and urgency  Musculoskeletal: Negative  Negative for gait problem, myalgias and neck pain  Skin: Negative  Negative for rash  Allergic/Immunologic: Negative  Neurological: Positive for headaches   Negative for dizziness, tremors, seizures, syncope, facial asymmetry, speech difficulty, weakness, light-headedness and numbness  Hematological: Negative  Does not bruise/bleed easily  Psychiatric/Behavioral: Negative  Negative for confusion, hallucinations and sleep disturbance

## 2023-03-08 ENCOUNTER — APPOINTMENT (OUTPATIENT)
Dept: LAB | Facility: HOSPITAL | Age: 38
End: 2023-03-08

## 2023-03-08 DIAGNOSIS — H93.A2 PULSATILE TINNITUS OF LEFT EAR: ICD-10-CM

## 2023-03-08 DIAGNOSIS — D50.9 IRON DEFICIENCY ANEMIA, UNSPECIFIED IRON DEFICIENCY ANEMIA TYPE: ICD-10-CM

## 2023-03-08 DIAGNOSIS — D50.9 IRON DEFICIENCY ANEMIA, UNSPECIFIED IRON DEFICIENCY ANEMIA TYPE: Primary | ICD-10-CM

## 2023-03-08 DIAGNOSIS — M25.50 ARTHRALGIA, UNSPECIFIED JOINT: ICD-10-CM

## 2023-03-08 LAB
CRP SERPL QL: <1 MG/L
ERYTHROCYTE [SEDIMENTATION RATE] IN BLOOD: 51 MM/HOUR (ref 0–19)
FERRITIN SERPL-MCNC: 4 NG/ML (ref 8–388)
IGA SERPL-MCNC: 221 MG/DL (ref 70–400)
IGG SERPL-MCNC: 1290 MG/DL (ref 700–1600)
IGM SERPL-MCNC: 160 MG/DL (ref 40–230)
IRON SATN MFR SERPL: 7 % (ref 15–50)
IRON SERPL-MCNC: 29 UG/DL (ref 50–170)
TIBC SERPL-MCNC: 439 UG/DL (ref 250–450)

## 2023-03-09 LAB
ENA SS-A AB SER-ACNC: <0.2 AI (ref 0–0.9)
ENA SS-B AB SER-ACNC: <0.2 AI (ref 0–0.9)
RHEUMATOID FACT SER QL LA: NEGATIVE

## 2023-03-09 RX ORDER — FERROUS SULFATE TAB EC 324 MG (65 MG FE EQUIVALENT) 324 (65 FE) MG
TABLET DELAYED RESPONSE ORAL
Qty: 60 TABLET | Refills: 0 | Status: SHIPPED | OUTPATIENT
Start: 2023-03-09

## 2023-03-09 RX ORDER — MULTIVIT WITH MINERALS/LUTEIN
TABLET ORAL
Qty: 60 TABLET | Refills: 0 | Status: SHIPPED | OUTPATIENT
Start: 2023-03-09

## 2023-03-12 PROBLEM — R00.2 PALPITATIONS: Status: ACTIVE | Noted: 2023-03-12

## 2023-03-12 PROBLEM — M25.50 ARTHRALGIA: Status: ACTIVE | Noted: 2023-03-12

## 2023-03-12 PROBLEM — G43.009 MIGRAINE WITHOUT AURA AND WITHOUT STATUS MIGRAINOSUS, NOT INTRACTABLE: Status: ACTIVE | Noted: 2023-03-12

## 2023-03-16 ENCOUNTER — TELEPHONE (OUTPATIENT)
Dept: NEUROLOGY | Facility: CLINIC | Age: 38
End: 2023-03-16

## 2023-03-16 NOTE — TELEPHONE ENCOUNTER
----- Message from Yobany Montano MD sent at 3/12/2023  8:40 PM EDT -----  Please call the patient regarding her abnormal result - persistently elevated ESR wit negative Rheumatoid factor and sjogren's antibodies, patient is to follow with a primary team

## 2023-03-20 ENCOUNTER — APPOINTMENT (OUTPATIENT)
Dept: LAB | Facility: HOSPITAL | Age: 38
End: 2023-03-20

## 2023-03-20 DIAGNOSIS — D50.9 IRON DEFICIENCY ANEMIA, UNSPECIFIED IRON DEFICIENCY ANEMIA TYPE: ICD-10-CM

## 2023-03-20 LAB — HEMOCCULT STL QL IA: NEGATIVE

## 2023-03-21 ENCOUNTER — HOSPITAL ENCOUNTER (OUTPATIENT)
Dept: CT IMAGING | Facility: HOSPITAL | Age: 38
Discharge: HOME | End: 2023-03-21
Attending: PSYCHIATRY & NEUROLOGY

## 2023-03-21 DIAGNOSIS — H93.A2 PULSATILE TINNITUS OF LEFT EAR: ICD-10-CM

## 2023-03-21 RX ADMIN — IOHEXOL 85 ML: 350 INJECTION, SOLUTION INTRAVENOUS at 13:57

## 2023-03-24 ENCOUNTER — TELEPHONE (OUTPATIENT)
Dept: NEUROLOGY | Facility: CLINIC | Age: 38
End: 2023-03-24

## 2023-04-04 ENCOUNTER — TELEMEDICINE (OUTPATIENT)
Dept: NEUROLOGY | Facility: CLINIC | Age: 38
End: 2023-04-04

## 2023-04-04 ENCOUNTER — TELEPHONE (OUTPATIENT)
Dept: NEUROLOGY | Facility: CLINIC | Age: 38
End: 2023-04-04

## 2023-04-04 VITALS — WEIGHT: 141 LBS | BODY MASS INDEX: 24.98 KG/M2 | HEIGHT: 63 IN

## 2023-04-04 DIAGNOSIS — G43.009 MIGRAINE WITHOUT AURA AND WITHOUT STATUS MIGRAINOSUS, NOT INTRACTABLE: ICD-10-CM

## 2023-04-04 DIAGNOSIS — R00.2 PALPITATIONS: ICD-10-CM

## 2023-04-04 DIAGNOSIS — H93.A2 PULSATILE TINNITUS OF LEFT EAR: Primary | ICD-10-CM

## 2023-04-04 DIAGNOSIS — J32.3 CHRONIC SPHENOIDAL SINUSITIS: ICD-10-CM

## 2023-04-04 NOTE — TELEPHONE ENCOUNTER
Phone patient for virtual visit today at [address] [facility] with Dr Dali Camacho on file [phone]   states wireless customer not available plz try call later and other number on file [phone] person you call is not accepting calls at this time  MyChart has been sent by MR to contact our office

## 2023-04-04 NOTE — PROGRESS NOTES
Virtual Regular Visit    Verification of patient location:    Patient is located in the following state in which I hold an active license [place]      Assessment/Plan:    Problem List Items Addressed This Visit        Respiratory    Chronic sphenoidal sinusitis       Cardiovascular and Mediastinum    Migraine without aura and without status migrainosus, not intractable       Nervous and Auditory    Pulsatile tinnitus of left ear - Primary       Other    Palpitations            Reason for visit is FU  Chief Complaint   Patient presents with   • Virtual Regular Visit        Encounter provider Ruben Phillips MD    Provider located at [address] [id number]      Recent Visits  No visits were found meeting these conditions  Showing recent visits within past 7 days and meeting all other requirements  Future Appointments  No visits were found meeting these conditions  Showing future appointments within next 150 days and meeting all other requirements       The patient was identified by name and date of birth  Jeanette Peterson was informed that this is a telemedicine visit and that the visit is being conducted through the [employer]  She agrees to proceed     My office door was closed  No one else was in the room  She acknowledged consent and understanding of privacy and security of the video platform  The patient has agreed to participate and understands they can discontinue the visit at any time  Patient is aware this is a billable service  Subjective  Laly Mancilla is a 44 y o  female palpitation and headache   HPI   Mrs Brittanie Kenny has presented to [place] [address] multiple sclerosis center for evaluation of left-sided pulsatile tinnitus    Patient stated her symptoms started around the time she had significant weight loss as patient had lost 44 pounds over 3 months with an intense exercise program and dietary changes in addition to her regimen which included metformin plus phentermine and insulin  We personally reviewed patient imaging studies, patient has no internal auditory canal pathology, with no signs of inflammation or neoplasm been described; patient does have right sphenoid sinus disease but less likely this would contribute to left-sided pulsatile tinnitus; We discussed potential differential diagnosis including elevated intracranial pressure but patient does not have classic signs of transient visual obscuration with pressure headache; 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 but patient has MRA head done and within normal range; we also discussed cardiac origin of her clinical presentation concerning patient family history of myocardial hypertrophy with Holter monitoring completed in 2013 for palpitation-patient was offered Holter monitoring 48 h-long study; Patient was also offered CT venogram in the light of her clinical findings as the narrowing of the veins causes a disturbance in the blood flow, contributing to the whooshing sounds of pulsatile tinnitus  We also discussed hearing loss may present initially as a tinnitus and basic serological screening for rheumatological condition will be advised with a strong family history of RA in the patient's mother      I doubt benign positional vertigo/Ménière's disease/PPPD has similar presentation; patient has no signs of nystagmus or disequilibrium on today's exam      [facility]    Mrs Lele Ayon has presented to [place] [address] multiple sclerosis center for follow-up on headaches with tinnitus in addition to intermittent palpitation  Patient describes her symptoms as persistent  Patient followed my recommendation as she completed CT venogram with normal cerebral sinuses described, patient does have opacification in sphenoethmoidal recess with bony erosion or destruction    Also completed brain imaging and MR angiogram head and carotid arteries; brain imaging condition were unremarkable but still concern was for right sphenoid sinus disease  Patient continue having persistently high ESR with normal C-reactive protein/Sjogren's/RF/immunoglobulins  Has strong family history of neurological condition including rheumatoid arthritis in her mother, she may benefit from establishing care with rheumatology  No concern for idiopathic intracranial hypertension as patient has no transient visual obscuration and widely patent cerebral sinuses  Focal neurological deficit has been reported  Patient is to follow-up with primary care team for Holter monitoring if clinically advised  Patient may safely use Tylenol versus Fioricet versus ibuprofen for headache, patient may require preventive regimen for her migraine headaches if patient believes she is getting worse patient is to avoid calcium channel blocker/beta-blockers considering patient already taking nifedipine, nortriptyline versus Topamax might be ideal regimens considering patient is on weight loss program with excellent outcomes been reached  Patient is to follow [facility] neurology on as-needed basis  Past Medical History:   Diagnosis Date   • Anemia     iron def   anemia   • Fibroid    • Gestational diabetes mellitus (GDM) in first trimester 2019   • Migraine without aura and without status migrainosus, not intractable 3/12/2023   • Pre-eclampsia, delivered, with postpartum complication    • Varicella        Past Surgical History:   Procedure Laterality Date   •  SECTION     • MYOMECTOMY     • [place]  DELIVERY ONLY N/A 2020    Procedure:  SECTION () REPEAT;  Surgeon: Amaya Perez MD;  Location: [facility];  Service: Obstetrics       Current Outpatient Medications   Medication Sig Dispense Refill   • ascorbic acid (VITAMIN C) 250 mg tablet Take vitamin C with iron every other day 60 tablet 0   • Cholecalciferol (VITAMIN D) 50 MCG ( UT) CAPS Take by mouth     • ferrous sulfate 324 (65 Fe) mg 1 pill every other day with vitamin C 60 tablet 0   • acetaminophen (TYLENOL) 325 mg tablet Take 2 tablets (650 mg total) by mouth every 6 (six) hours (Patient not taking: Reported on 1/17/2023) 30 tablet 0   • baclofen 10 mg tablet Take 1 tablet (10 mg total) by mouth 2 (two) times a day as needed for muscle spasms (Patient not taking: Reported on 7/6/2020) 20 tablet 0   • Butalbital-APAP-Caffeine (Fioricet) -40 MG CAPS Take 1 tablet by mouth 4 (four) times a day as needed (headache) (Patient not taking: Reported on 6/24/2020) 15 capsule 1   • docusate sodium (COLACE) 100 mg capsule Take 100 mg by mouth as needed for constipation (Patient not taking: Reported on 4/4/2023)     • fluticasone (FLONASE) 50 mcg/act nasal spray 1 spray into each nostril daily (Patient not taking: Reported on 4/4/2023) 16 g 1   • ibuprofen (MOTRIN) 600 mg tablet Take 1 tablet (600 mg total) by mouth every 6 (six) hours as needed for moderate pain (Patient not taking: Reported on 4/4/2023) 50 tablet 1   • Insulin Pen Needle 31G X 5 MM MISC Inject under the skin daily at bedtime Use one a day or as directed  100 each 1   • loratadine (Claritin) 10 mg tablet Take 1 tablet (10 mg total) by mouth daily (Patient not taking: Reported on 4/4/2023) 90 tablet 1   • NIFEdipine ER (ADALAT CC) 30 MG 24 hr tablet Take 1 tablet (30 mg total) by mouth daily (Patient not taking: Reported on 3/10/2021) 30 tablet 2   • norethindrone (MICRONOR) 0 35 MG tablet Take 1 tablet (0 35 mg total) by mouth daily (Patient not taking: Reported on 3/10/2021) 90 tablet 3   • OneTouch Delica Lancets 65K MISC Use 4 a day or as recommended  (Patient not taking: Reported on 6/2/2020) 100 each 6   • Prenatal Vit-Fe Fumarate-FA (PRENATAL 1 PLUS 1 PO) Take 1 tablet by mouth daily (Patient not taking: Reported on 1/17/2023)       No current facility-administered medications for this visit          No Known "Allergies    Review of Systems   Constitutional: Negative  Negative for appetite change and fever  HENT: Negative  Negative for hearing loss, tinnitus, trouble swallowing and voice change  Eyes: Negative  Negative for photophobia, pain and visual disturbance  Respiratory: Negative  Negative for shortness of breath  Cardiovascular: Positive for palpitations  Gastrointestinal: Negative  Negative for nausea and vomiting  Endocrine: Negative  Negative for cold intolerance  Genitourinary: Negative  Negative for dysuria, frequency and urgency  Musculoskeletal: Negative  Negative for gait problem, myalgias and neck pain  Skin: Negative  Negative for rash  Allergic/Immunologic: Negative  Neurological: Positive for headaches  Negative for dizziness, tremors, seizures, syncope, facial asymmetry, speech difficulty, weakness, light-headedness and numbness  Hematological: Negative  Does not bruise/bleed easily  Psychiatric/Behavioral: Negative  Negative for confusion, hallucinations and sleep disturbance         Video Exam    Vitals:    04/04/23 0811   Weight: 64 kg (141 lb)   Height: 5' 3\" (1 6 m)       Physical Exam     I spent 15 minutes directly with the patient during this visit      "

## 2023-04-05 NOTE — TELEPHONE ENCOUNTER
Patient had virtual visit with me today at 8 am - encounter was completed, but now this encounter is marked as NSH  My note is completed - please make an encounter active for me to be able to complete it

## 2023-04-08 ENCOUNTER — TELEPHONE (OUTPATIENT)
Dept: NEUROLOGY | Facility: CLINIC | Age: 38
End: 2023-04-08

## 2023-04-08 NOTE — TELEPHONE ENCOUNTER
PATIENT WAS SEEN ON 4/4/2023 AT 8 AM AS A VIRTUAL VISIT     PATIENT IS MARKED AS 'NOT SEEN' DESPITE 2 MESSAGES SENT TO MANAGEMENT     PLEASE ACTIVATE THE ENCOUNTER FOR ME TO BE ABLE TO sign it

## 2023-04-27 ENCOUNTER — OFFICE VISIT (OUTPATIENT)
Dept: FAMILY MEDICINE CLINIC | Facility: CLINIC | Age: 38
End: 2023-04-27

## 2023-04-27 VITALS
SYSTOLIC BLOOD PRESSURE: 104 MMHG | HEART RATE: 78 BPM | WEIGHT: 148 LBS | OXYGEN SATURATION: 100 % | BODY MASS INDEX: 26.22 KG/M2 | HEIGHT: 63 IN | DIASTOLIC BLOOD PRESSURE: 68 MMHG | RESPIRATION RATE: 18 BRPM | TEMPERATURE: 97.5 F

## 2023-04-27 DIAGNOSIS — D50.9 IRON DEFICIENCY ANEMIA, UNSPECIFIED IRON DEFICIENCY ANEMIA TYPE: Primary | ICD-10-CM

## 2023-04-27 DIAGNOSIS — E87.6 HYPOKALEMIA: ICD-10-CM

## 2023-04-27 NOTE — PROGRESS NOTES
"Assessment/Plan:           Problem List Items Addressed This Visit        Other    Anemia - Primary    Relevant Orders    CBC and differential    Comprehensive metabolic panel    Iron Panel (Includes Ferritin, Iron Sat%, Iron, and TIBC)   Other Visit Diagnoses     Hypokalemia        Relevant Orders    Comprehensive metabolic panel    Magnesium    BMI 26 0-26 9,adult        Relevant Orders    Ambulatory Referral to Weight Management            Subjective:      Patient ID: Jose Elias Herrera is a 44 y o  female  HPI   Patient with history of anemia and hypokalemia  Patient was found to have iron deficiency likely because of heavy menstrual cycle as well as post pregnancy  CBC was low at 9 8 Hemoccult is negative  Patient has been taking iron supplementation  She reports feeling much better since has been taking iron supplement  Follow-up lab studies will be ordered  If levels are still low consider GI consult  Patient understands the plan    Patient had an MRI/MRA of the brain which was unremarkable except for chronic sphenoid sinusitis  Patient had had allergy problems in the past and has been taking antihistamine over-the-counter with improvement of all of her symptoms  She in fact reports that she has not had any further pulsatile tinnitus as well  Neurology consultation reviewed  Patient had a CT venogram of the brain which was unremarkable  Holter monitor has not been carried out  She reports allergies to be much better  The following portions of the patient's history were reviewed and updated as appropriate: allergies, current medications, past medical history, past social history, past surgical history and problem list     Review of Systems      Objective:      /68   Pulse 78   Temp 97 5 °F (36 4 °C)   Resp 18   Ht 5' 3\" (1 6 m)   Wt 67 1 kg (148 lb)   SpO2 100%   BMI 26 22 kg/m²          Physical Exam  Cardiovascular:      Rate and Rhythm: Normal rate and regular rhythm        " Heart sounds: No murmur heard  Pulmonary:      Effort: Pulmonary effort is normal  No respiratory distress  Breath sounds: No wheezing or rales  Abdominal:      General: There is no distension  Tenderness: There is no abdominal tenderness  Neurological:      Mental Status: She is alert

## 2023-05-07 PROBLEM — J32.3 CHRONIC SPHENOIDAL SINUSITIS: Status: ACTIVE | Noted: 2023-05-07

## 2023-05-11 ENCOUNTER — APPOINTMENT (OUTPATIENT)
Dept: LAB | Facility: HOSPITAL | Age: 38
End: 2023-05-11

## 2023-05-11 DIAGNOSIS — E87.6 HYPOKALEMIA: ICD-10-CM

## 2023-05-11 LAB
ALBUMIN SERPL BCP-MCNC: 4.3 G/DL (ref 3.5–5)
ALP SERPL-CCNC: 56 U/L (ref 34–104)
ALT SERPL W P-5'-P-CCNC: 10 U/L (ref 7–52)
ANION GAP SERPL CALCULATED.3IONS-SCNC: 5 MMOL/L (ref 4–13)
AST SERPL W P-5'-P-CCNC: 17 U/L (ref 13–39)
BASOPHILS # BLD AUTO: 0.02 THOUSANDS/ÂΜL (ref 0–0.1)
BASOPHILS NFR BLD AUTO: 0 % (ref 0–1)
BILIRUB SERPL-MCNC: 0.37 MG/DL (ref 0.2–1)
BUN SERPL-MCNC: 13 MG/DL (ref 5–25)
CALCIUM SERPL-MCNC: 9.7 MG/DL (ref 8.4–10.2)
CHLORIDE SERPL-SCNC: 104 MMOL/L (ref 96–108)
CO2 SERPL-SCNC: 28 MMOL/L (ref 21–32)
CREAT SERPL-MCNC: 0.74 MG/DL (ref 0.6–1.3)
EOSINOPHIL # BLD AUTO: 0.23 THOUSAND/ÂΜL (ref 0–0.61)
EOSINOPHIL NFR BLD AUTO: 4 % (ref 0–6)
ERYTHROCYTE [DISTWIDTH] IN BLOOD BY AUTOMATED COUNT: 16.8 % (ref 11.6–15.1)
FERRITIN SERPL-MCNC: 9 NG/ML (ref 8–388)
GFR SERPL CREATININE-BSD FRML MDRD: 103 ML/MIN/1.73SQ M
GLUCOSE P FAST SERPL-MCNC: 83 MG/DL (ref 65–99)
HCT VFR BLD AUTO: 32.5 % (ref 34.8–46.1)
HGB BLD-MCNC: 10.2 G/DL (ref 11.5–15.4)
IMM GRANULOCYTES # BLD AUTO: 0.02 THOUSAND/UL (ref 0–0.2)
IMM GRANULOCYTES NFR BLD AUTO: 0 % (ref 0–2)
IRON SATN MFR SERPL: 5 % (ref 15–50)
IRON SERPL-MCNC: 22 UG/DL (ref 50–170)
LYMPHOCYTES # BLD AUTO: 3.59 THOUSANDS/ÂΜL (ref 0.6–4.47)
LYMPHOCYTES NFR BLD AUTO: 60 % (ref 14–44)
MAGNESIUM SERPL-MCNC: 2.2 MG/DL (ref 1.9–2.7)
MCH RBC QN AUTO: 24.9 PG (ref 26.8–34.3)
MCHC RBC AUTO-ENTMCNC: 31.4 G/DL (ref 31.4–37.4)
MCV RBC AUTO: 80 FL (ref 82–98)
MONOCYTES # BLD AUTO: 0.41 THOUSAND/ÂΜL (ref 0.17–1.22)
MONOCYTES NFR BLD AUTO: 7 % (ref 4–12)
NEUTROPHILS # BLD AUTO: 1.72 THOUSANDS/ÂΜL (ref 1.85–7.62)
NEUTS SEG NFR BLD AUTO: 29 % (ref 43–75)
NRBC BLD AUTO-RTO: 0 /100 WBCS
PLATELET # BLD AUTO: 426 THOUSANDS/UL (ref 149–390)
PMV BLD AUTO: 10 FL (ref 8.9–12.7)
POTASSIUM SERPL-SCNC: 3.9 MMOL/L (ref 3.5–5.3)
PROT SERPL-MCNC: 7.7 G/DL (ref 6.4–8.4)
RBC # BLD AUTO: 4.09 MILLION/UL (ref 3.81–5.12)
SODIUM SERPL-SCNC: 137 MMOL/L (ref 135–147)
TIBC SERPL-MCNC: 468 UG/DL (ref 250–450)
WBC # BLD AUTO: 5.99 THOUSAND/UL (ref 4.31–10.16)

## 2023-05-12 ENCOUNTER — TELEPHONE (OUTPATIENT)
Dept: FAMILY MEDICINE CLINIC | Facility: CLINIC | Age: 38
End: 2023-05-12

## 2023-05-12 NOTE — TELEPHONE ENCOUNTER
----- Message from Kiesha Kraus MD sent at 5/11/2023  4:51 PM EDT -----  Please set up virtual appointment to discuss abnormal labs

## 2023-06-16 DIAGNOSIS — Z20.1 EXPOSURE TO MYCOBACTERIUM TUBERCULOSIS: Primary | ICD-10-CM

## 2023-08-09 ENCOUNTER — CONSULT (OUTPATIENT)
Dept: BARIATRICS | Facility: CLINIC | Age: 38
End: 2023-08-09
Payer: COMMERCIAL

## 2023-08-09 VITALS
HEART RATE: 57 BPM | HEIGHT: 63 IN | BODY MASS INDEX: 25.55 KG/M2 | SYSTOLIC BLOOD PRESSURE: 119 MMHG | DIASTOLIC BLOOD PRESSURE: 88 MMHG | RESPIRATION RATE: 16 BRPM | WEIGHT: 144.2 LBS

## 2023-08-09 DIAGNOSIS — E66.3 OVERWEIGHT: Primary | ICD-10-CM

## 2023-08-09 PROCEDURE — 99244 OFF/OP CNSLTJ NEW/EST MOD 40: CPT | Performed by: PHYSICIAN ASSISTANT

## 2023-08-09 NOTE — ASSESSMENT & PLAN NOTE
- Discussed options of [facility]ensive Lifestyle Intervention Program and Conservative Program and the role of weight loss medications. Explained the importance of making lifestyle changes if utilizing medication to aid in weight loss  -Screening labs and records reviewed from prior  - STOP BANG-0/8    -Patient is interested in pursuing Conservative Program    Goals:  -Food log (ie.) [email or web address],[email or web address],[employer]. com-1200 calories  - To drink at least 64oz of water daily. No sugary beverages.  -continue with walking for exercise  -discussed trying to decrease portion of rice and make sure to have enough vegetables with meals

## 2023-08-09 NOTE — PROGRESS NOTES
Assessment/Plan:    Overweight  - Discussed options of [facility] Lifestyle Intervention Program and Conservative Program and the role of weight loss medications. Explained the importance of making lifestyle changes if utilizing medication to aid in weight loss  -Screening labs and records reviewed from prior  - STOP BANG-0/8    -Patient is interested in pursuing Conservative Program    Goals:  -Food log (ie.) [email or web address],[email or web address]. com-1200 calories  - To drink at least 64oz of water daily. No sugary beverages.  -continue with walking for exercise  -discussed trying to decrease portion of rice and make sure to have enough vegetables with meals              Follow up in approximately PRN   I have spent a total time of 40 minutes on 08/10/23 in caring for this patient including Diagnostic results, Prognosis, Risks and benefits of tx options, Instructions for management, Patient and family education, Importance of tx compliance, Impressions and Counseling / Coordination of care. Diagnoses and all orders for this visit:    Overweight    BMI 26.0-26.9,adult  -     Ambulatory Referral to Weight Management          Subjective:   Chief Complaint   Patient presents with   • Consult     MWM Consult;Gw-130lb; waist-31in ; Stop bang-0/0       Patient ID: Lanie Abarca  is a 44 y.o. female with excess weight/obesity here to pursue weight management. Past Medical History:   Diagnosis Date   • Anemia     iron def. anemia   • Fibroid    • Gestational diabetes mellitus (GDM) in first trimester 12/23/2019   • Migraine without aura and without status migrainosus, not intractable 3/12/2023   • Pre-eclampsia, delivered, with postpartum complication 4/36/3047   • Varicella        HPI: Here for MWM consult    Here for MWM consult. Last year she was seeing a physician to help with weight loss. She was on mounjaro and was up to 187lb. She took mounjaro for 3 months only.       She has concern about weight regain. She is typically eating 1-2 times a day. She is staying active. At home her weight was 139 last week. Currently on her menses. 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 Diet Recall:   L: rice , tomato grvay, beans, meat/fish. D: rice, protein  S: rare varies-usually small portion, sometimes fruit    The following portions of the patient's history were reviewed and updated as appropriate:   She  has a past medical history of Anemia, Fibroid, Gestational diabetes mellitus (GDM) in first trimester (2019), Migraine without aura and without status migrainosus, not intractable (3/12/2023), Pre-eclampsia, delivered, with postpartum complication (), and Varicella. She   Patient Active Problem List    Diagnosis Date Noted   • Overweight 2023   • Chronic sphenoidal sinusitis 2023   • Migraine without aura and without status migrainosus, not intractable 2023   • Palpitations 2023   • Arthralgia 2023   • Pulsatile tinnitus of left ear 2023   • Chronic bilateral low back pain without sciatica 03/10/2021   • Status post repeat low transverse  section 2020   • Prediabetes 2019   • Pregnancy with history of uterine myomectomy 2019   • History of  section 2018   • Headache 2016   • Anemia 2015     She  has a past surgical history that includes Myomectomy;  section; and pr  delivery only (N/A, 2020).   Her family history includes Autism in her daughter; Dementia in her maternal grandmother; Diabetes in her mother and paternal grandfather; Hypertension in her father, mother, and sister; No Known Problems in her brother, brother, and sister. She  reports that she has never smoked. She has never used smokeless tobacco. She reports that she does not currently use alcohol. She reports that she does not use drugs. Current Outpatient Medications   Medication Sig Dispense Refill   • ascorbic acid (VITAMIN C) 250 mg tablet Take vitamin C with iron every other day 60 tablet 0   • Cholecalciferol (VITAMIN D) 50 MCG (2000 UT) CAPS Take by mouth     • ferrous sulfate 324 (65 Fe) mg 1 pill every other day with vitamin C 60 tablet 0   • fluticasone (FLONASE) 50 mcg/act nasal spray 1 spray into each nostril daily 16 g 1   • ibuprofen (MOTRIN) 600 mg tablet Take 1 tablet (600 mg total) by mouth every 6 (six) hours as needed for moderate pain (Patient not taking: Reported on 4/4/2023) 50 tablet 1   • loratadine (Claritin) 10 mg tablet Take 1 tablet (10 mg total) by mouth daily (Patient not taking: Reported on 4/4/2023) 90 tablet 1   • norethindrone (MICRONOR) 0.35 MG tablet Take 1 tablet (0.35 mg total) by mouth daily (Patient not taking: Reported on 3/10/2021) 90 tablet 3     No current facility-administered medications for this visit.      Current Outpatient Medications on File Prior to Visit   Medication Sig   • ascorbic acid (VITAMIN C) 250 mg tablet Take vitamin C with iron every other day   • Cholecalciferol (VITAMIN D) 50 MCG (2000 UT) CAPS Take by mouth   • ferrous sulfate 324 (65 Fe) mg 1 pill every other day with vitamin C   • fluticasone (FLONASE) 50 mcg/act nasal spray 1 spray into each nostril daily   • ibuprofen (MOTRIN) 600 mg tablet Take 1 tablet (600 mg total) by mouth every 6 (six) hours as needed for moderate pain (Patient not taking: Reported on 4/4/2023)   • loratadine (Claritin) 10 mg tablet Take 1 tablet (10 mg total) by mouth daily (Patient not taking: Reported on 4/4/2023)   • norethindrone (MICRONOR) 0.35 MG tablet Take 1 tablet (0.35 mg total) by mouth daily (Patient not taking: Reported on 3/10/2021)     No current facility-administered medications on file prior to visit. .    Review of Systems   Constitutional: Negative for fever. Respiratory: Negative for shortness of breath. Cardiovascular: Negative for chest pain and palpitations. Gastrointestinal: Negative for abdominal pain, constipation, diarrhea and vomiting. Genitourinary: Negative for difficulty urinating. Musculoskeletal: Negative for arthralgias and back pain. Skin: Negative for rash. Neurological: Negative for headaches. Psychiatric/Behavioral: Negative for dysphoric mood. The patient is not nervous/anxious. Objective:    /88   Pulse 57   Resp 16   Ht 5' 3" (1.6 m)   Wt 65.4 kg (144 lb 3.2 oz)   BMI 25.54 kg/m²     Physical Exam  Vitals and nursing note reviewed. Constitutional:       General: She is not in acute distress. Appearance: She is well-developed. HENT:      Head: Normocephalic and atraumatic. Eyes:      Conjunctiva/sclera: Conjunctivae normal.   Neck:      Thyroid: No thyromegaly. Pulmonary:      Effort: Pulmonary effort is normal. No respiratory distress. Skin:     Findings: No rash (visible). Neurological:      Mental Status: She is alert and oriented to person, place, and time.    Psychiatric:         Mood and Affect: Mood normal.         Behavior: Behavior normal.

## 2023-08-09 NOTE — PATIENT INSTRUCTIONS
Try these alternative food options:  Protein shakes- Premier, Pure protein, Fairlife, Iconic  Lactose free protein shakes-  Fairlife, Ripple, Iconic, Laureano  Protein bars- Quest, Pure protein  Ice cream- Govind's, Yasso, Enlightened, Halo Top   Chips- Quest protein chips, Cheese crisps   Bread- 647 wheat, Protein Keto bread, whole wheat alethea bread, low carb/high protein wraps  Pasta- Chickpea pasta, Pasta zero or similar  Rice- Cauliflower rice, quinoa, wild rice, brown rice  Fruits- berries, cantaloupe, peaches, apples, oranges  Yogurt- Ratio (25g protein), Skyr, Two good, Chobani 60 marion or 100 marion, Oikos triple zero  Sugar alternatives- Stevia, Monk fruit, Swerve    . iron

## 2023-11-14 ENCOUNTER — OFFICE VISIT (OUTPATIENT)
Dept: FAMILY MEDICINE CLINIC | Facility: CLINIC | Age: 38
End: 2023-11-14
Payer: COMMERCIAL

## 2023-11-14 VITALS
HEIGHT: 63 IN | BODY MASS INDEX: 26.68 KG/M2 | WEIGHT: 150.6 LBS | RESPIRATION RATE: 16 BRPM | HEART RATE: 82 BPM | SYSTOLIC BLOOD PRESSURE: 100 MMHG | DIASTOLIC BLOOD PRESSURE: 80 MMHG | OXYGEN SATURATION: 99 %

## 2023-11-14 DIAGNOSIS — R53.83 OTHER FATIGUE: ICD-10-CM

## 2023-11-14 DIAGNOSIS — E87.6 HYPOKALEMIA: ICD-10-CM

## 2023-11-14 DIAGNOSIS — Z34.90 EARLY STAGE OF PREGNANCY: Primary | ICD-10-CM

## 2023-11-14 DIAGNOSIS — D50.9 IRON DEFICIENCY ANEMIA, UNSPECIFIED IRON DEFICIENCY ANEMIA TYPE: ICD-10-CM

## 2023-11-14 PROCEDURE — 99212 OFFICE O/P EST SF 10 MIN: CPT | Performed by: INTERNAL MEDICINE

## 2023-11-14 NOTE — PROGRESS NOTES
Assessment/Plan:           Problem List Items Addressed This Visit       Anemia     Other Visit Diagnoses       Early stage of pregnancy    -  Primary    Hypokalemia        Other fatigue                  Subjective:      Patient ID: Eduardo Age is a 43 y.o. female. HPI  Patient originally made this appointment concerned with fatigue nausea increase sensitivity to perfumes in his body few days ago and felt she was starting her. Checked a pregnancy test this morning which was positive. However, she is quite upset and does not want to keep the pregnancy. She has contacted her OB/GYN and will discuss this further with them. The following portions of the patient's history were reviewed and updated as appropriate: allergies, current medications, past medical history, past social history, past surgical history, and problem list.    Review of Systems      Objective:      /80 (BP Location: Left arm, Patient Position: Sitting, Cuff Size: Adult)   Pulse 82   Resp 16   Ht 5' 3" (1.6 m)   Wt 68.3 kg (150 lb 9.6 oz)   LMP 10/05/2023 (Exact Date)   SpO2 99%   BMI 26.68 kg/m²          Physical Exam  Abdominal:      General: There is no distension. Tenderness: There is no abdominal tenderness. Musculoskeletal:         General: No tenderness. Right lower leg: No edema. Left lower leg: No edema. Lymphadenopathy:      Cervical: No cervical adenopathy. Neurological:      Mental Status: She is alert.

## 2023-11-15 ENCOUNTER — OFFICE VISIT (OUTPATIENT)
Dept: OBGYN CLINIC | Facility: CLINIC | Age: 38
End: 2023-11-15
Payer: COMMERCIAL

## 2023-11-15 VITALS — SYSTOLIC BLOOD PRESSURE: 114 MMHG | DIASTOLIC BLOOD PRESSURE: 70 MMHG | WEIGHT: 149 LBS | BODY MASS INDEX: 26.39 KG/M2

## 2023-11-15 DIAGNOSIS — Z32.01 POSITIVE PREGNANCY TEST: Primary | ICD-10-CM

## 2023-11-15 DIAGNOSIS — Z67.91 RH NEGATIVE, ANTEPARTUM: ICD-10-CM

## 2023-11-15 DIAGNOSIS — O26.899 RH NEGATIVE, ANTEPARTUM: ICD-10-CM

## 2023-11-15 LAB — SL AMB POCT URINE HCG: ABNORMAL

## 2023-11-15 PROCEDURE — 96372 THER/PROPH/DIAG INJ SC/IM: CPT | Performed by: PHYSICIAN ASSISTANT

## 2023-11-15 PROCEDURE — 81025 URINE PREGNANCY TEST: CPT | Performed by: PHYSICIAN ASSISTANT

## 2023-11-15 PROCEDURE — 99203 OFFICE O/P NEW LOW 30 MIN: CPT | Performed by: PHYSICIAN ASSISTANT

## 2023-11-15 NOTE — PROGRESS NOTES
Assessment/Plan:    1. Positive pregnancy test  - Pt is certain she desires termination. Brochure given for [facility] which pt will call today. She did start spotting today. Rhogam given due to O neg status. Advised to call the office if spotting progresses to vaginal bleeding/cramping for SAB work up/follow up  - POCT urine HCG    2. Rh negative, antepartum  -rhogam received today  - Rho(D) immune globulin (RHOGAM ULTRA-FILTERED PLUS) IM injection 300 mcg      Subjective:      Patient ID: Shanna Siddiqui is a 43 y.o. female. Pt presents today for a new patient problem visit. She took a positive home pregnancy test yesterday after noting she was late for LMP. LMP 10/5/23 which gives her an estimated gestational age of 9w10d. She is certain she does not want to continue this pregnancy and is looking to move forward termination. Blood type is O negative. She reports starting with some light spotting and cramping this AM. Had one instance of spotting on 11/2/2023 as well. Denies heavy bright red vaginal bleeding/cramping. We discussed local termination resources including [facility]. Brochure was given to patient today. She will call them today. Support given. Will plan to give rhogam in office today since patient started to spot this AM. Advised to call the office should vaginal bleeding/cramping progress for SAB work up. The following portions of the patient's history were reviewed and updated as appropriate: allergies, current medications, past family history, past medical history, past social history, past surgical history, and problem list.    Review of Systems      Objective:      /70 (BP Location: Left arm, Patient Position: Sitting, Cuff Size: Large)   Wt 67.6 kg (149 lb)   LMP 10/05/2023 (Exact Date)   BMI 26.39 kg/m²          Physical Exam  Vitals reviewed. Constitutional:       Appearance: Normal appearance.    HENT:      Head: Normocephalic and atraumatic. Pulmonary:      Effort: Pulmonary effort is normal.   Abdominal:      General: Abdomen is flat. There is no distension. Neurological:      Mental Status: She is alert. Psychiatric:         Mood and Affect: Mood normal.         Behavior: Behavior normal.         Thought Content:  Thought content normal.         Judgment: Judgment normal.

## 2023-12-19 PROBLEM — Z98.891 HISTORY OF 2 CESAREAN SECTIONS: Status: ACTIVE | Noted: 2023-12-19

## 2023-12-19 PROBLEM — Z01.419 ENCOUNTER FOR ANNUAL ROUTINE GYNECOLOGICAL EXAMINATION: Status: ACTIVE | Noted: 2023-12-19

## 2023-12-28 NOTE — PROGRESS NOTES
[address]: Ms Moshe Zaman was seen today at [address] for nuchal translucency ultrasound  See ultrasound report under "OB Procedures" tab  Please don't hesitate to contact our office with any concerns or questions    Jared Lofton MD complains of pain/discomfort

## 2024-02-12 ENCOUNTER — TELEPHONE (OUTPATIENT)
Dept: OBGYN CLINIC | Facility: CLINIC | Age: 39
End: 2024-02-12

## 2024-02-12 NOTE — TELEPHONE ENCOUNTER
Left voicemail for patient to reschedule appointment scheduled for her yearly for 2/13/24, due to weather.

## 2024-02-17 PROBLEM — Z01.419 ENCOUNTER FOR ANNUAL ROUTINE GYNECOLOGICAL EXAMINATION: Status: RESOLVED | Noted: 2023-12-19 | Resolved: 2024-02-17

## 2024-04-01 ENCOUNTER — APPOINTMENT (OUTPATIENT)
Dept: LAB | Facility: HOSPITAL | Age: 39
End: 2024-04-01

## 2024-04-01 DIAGNOSIS — D50.9 IRON DEFICIENCY ANEMIA, UNSPECIFIED IRON DEFICIENCY ANEMIA TYPE: ICD-10-CM

## 2024-04-01 DIAGNOSIS — Z00.8 ENCOUNTER FOR OTHER GENERAL EXAMINATION: ICD-10-CM

## 2024-04-01 DIAGNOSIS — E55.9 VITAMIN D DEFICIENCY: Primary | ICD-10-CM

## 2024-04-01 LAB
CHOLEST SERPL-MCNC: 211 MG/DL
EST. AVERAGE GLUCOSE BLD GHB EST-MCNC: 123 MG/DL
HBA1C MFR BLD: 5.9 %
HDLC SERPL-MCNC: 57 MG/DL
LDLC SERPL CALC-MCNC: 123 MG/DL (ref 0–100)
NONHDLC SERPL-MCNC: 154 MG/DL
TRIGL SERPL-MCNC: 157 MG/DL

## 2024-04-01 PROCEDURE — 36415 COLL VENOUS BLD VENIPUNCTURE: CPT

## 2024-04-01 PROCEDURE — 80061 LIPID PANEL: CPT

## 2024-04-01 PROCEDURE — 83036 HEMOGLOBIN GLYCOSYLATED A1C: CPT

## 2024-04-02 ENCOUNTER — TELEPHONE (OUTPATIENT)
Age: 39
End: 2024-04-02

## 2024-04-02 ENCOUNTER — APPOINTMENT (OUTPATIENT)
Dept: LAB | Facility: MEDICAL CENTER | Age: 39
End: 2024-04-02
Payer: COMMERCIAL

## 2024-04-02 ENCOUNTER — OFFICE VISIT (OUTPATIENT)
Dept: FAMILY MEDICINE CLINIC | Facility: CLINIC | Age: 39
End: 2024-04-02
Payer: COMMERCIAL

## 2024-04-02 VITALS
RESPIRATION RATE: 16 BRPM | HEIGHT: 63 IN | SYSTOLIC BLOOD PRESSURE: 118 MMHG | BODY MASS INDEX: 26.22 KG/M2 | TEMPERATURE: 97 F | WEIGHT: 148 LBS | DIASTOLIC BLOOD PRESSURE: 70 MMHG | OXYGEN SATURATION: 98 % | HEART RATE: 70 BPM

## 2024-04-02 DIAGNOSIS — D50.9 IRON DEFICIENCY ANEMIA, UNSPECIFIED IRON DEFICIENCY ANEMIA TYPE: ICD-10-CM

## 2024-04-02 DIAGNOSIS — R73.09 ELEVATED HEMOGLOBIN A1C: ICD-10-CM

## 2024-04-02 DIAGNOSIS — Z00.00 ANNUAL PHYSICAL EXAM: Primary | ICD-10-CM

## 2024-04-02 DIAGNOSIS — Z13.29 THYROID DISORDER SCREENING: ICD-10-CM

## 2024-04-02 DIAGNOSIS — J30.2 SEASONAL ALLERGIES: ICD-10-CM

## 2024-04-02 LAB
ALBUMIN SERPL BCP-MCNC: 4.1 G/DL (ref 3.5–5)
ALP SERPL-CCNC: 56 U/L (ref 34–104)
ALT SERPL W P-5'-P-CCNC: 15 U/L (ref 7–52)
ANION GAP SERPL CALCULATED.3IONS-SCNC: 6 MMOL/L (ref 4–13)
AST SERPL W P-5'-P-CCNC: 22 U/L (ref 13–39)
BACTERIA UR QL AUTO: ABNORMAL /HPF
BASOPHILS # BLD AUTO: 0.02 THOUSANDS/ÂΜL (ref 0–0.1)
BASOPHILS NFR BLD AUTO: 0 % (ref 0–1)
BILIRUB SERPL-MCNC: 0.29 MG/DL (ref 0.2–1)
BILIRUB UR QL STRIP: NEGATIVE
BUN SERPL-MCNC: 11 MG/DL (ref 5–25)
CALCIUM SERPL-MCNC: 9.1 MG/DL (ref 8.4–10.2)
CHLORIDE SERPL-SCNC: 106 MMOL/L (ref 96–108)
CLARITY UR: CLEAR
CO2 SERPL-SCNC: 28 MMOL/L (ref 21–32)
COLOR UR: ABNORMAL
CREAT SERPL-MCNC: 0.68 MG/DL (ref 0.6–1.3)
EOSINOPHIL # BLD AUTO: 0.07 THOUSAND/ÂΜL (ref 0–0.61)
EOSINOPHIL NFR BLD AUTO: 1 % (ref 0–6)
ERYTHROCYTE [DISTWIDTH] IN BLOOD BY AUTOMATED COUNT: 13.4 % (ref 11.6–15.1)
FERRITIN SERPL-MCNC: 5 NG/ML (ref 11–307)
GFR SERPL CREATININE-BSD FRML MDRD: 110 ML/MIN/1.73SQ M
GLUCOSE P FAST SERPL-MCNC: 98 MG/DL (ref 65–99)
GLUCOSE UR STRIP-MCNC: NEGATIVE MG/DL
HCT VFR BLD AUTO: 34 % (ref 34.8–46.1)
HGB BLD-MCNC: 10.7 G/DL (ref 11.5–15.4)
HGB UR QL STRIP.AUTO: NEGATIVE
IMM GRANULOCYTES # BLD AUTO: 0.01 THOUSAND/UL (ref 0–0.2)
IMM GRANULOCYTES NFR BLD AUTO: 0 % (ref 0–2)
IRON SATN MFR SERPL: 6 % (ref 15–50)
IRON SERPL-MCNC: 26 UG/DL (ref 50–212)
KETONES UR STRIP-MCNC: NEGATIVE MG/DL
LEUKOCYTE ESTERASE UR QL STRIP: NEGATIVE
LYMPHOCYTES # BLD AUTO: 2.24 THOUSANDS/ÂΜL (ref 0.6–4.47)
LYMPHOCYTES NFR BLD AUTO: 39 % (ref 14–44)
MCH RBC QN AUTO: 27.6 PG (ref 26.8–34.3)
MCHC RBC AUTO-ENTMCNC: 31.5 G/DL (ref 31.4–37.4)
MCV RBC AUTO: 88 FL (ref 82–98)
MONOCYTES # BLD AUTO: 0.69 THOUSAND/ÂΜL (ref 0.17–1.22)
MONOCYTES NFR BLD AUTO: 12 % (ref 4–12)
MUCOUS THREADS UR QL AUTO: ABNORMAL
NEUTROPHILS # BLD AUTO: 2.71 THOUSANDS/ÂΜL (ref 1.85–7.62)
NEUTS SEG NFR BLD AUTO: 48 % (ref 43–75)
NITRITE UR QL STRIP: NEGATIVE
NON-SQ EPI CELLS URNS QL MICRO: ABNORMAL /HPF
NRBC BLD AUTO-RTO: 0 /100 WBCS
PH UR STRIP.AUTO: 6 [PH]
PLATELET # BLD AUTO: 350 THOUSANDS/UL (ref 149–390)
PMV BLD AUTO: 10.7 FL (ref 8.9–12.7)
POTASSIUM SERPL-SCNC: 4.7 MMOL/L (ref 3.5–5.3)
PROT SERPL-MCNC: 7.4 G/DL (ref 6.4–8.4)
PROT UR STRIP-MCNC: ABNORMAL MG/DL
RBC # BLD AUTO: 3.88 MILLION/UL (ref 3.81–5.12)
RBC #/AREA URNS AUTO: ABNORMAL /HPF
SODIUM SERPL-SCNC: 140 MMOL/L (ref 135–147)
SP GR UR STRIP.AUTO: 1.02 (ref 1–1.03)
TIBC SERPL-MCNC: 446 UG/DL (ref 250–450)
TSH SERPL DL<=0.05 MIU/L-ACNC: 0.72 UIU/ML (ref 0.45–4.5)
UIBC SERPL-MCNC: 420 UG/DL (ref 155–355)
UROBILINOGEN UR STRIP-ACNC: <2 MG/DL
WBC # BLD AUTO: 5.74 THOUSAND/UL (ref 4.31–10.16)
WBC #/AREA URNS AUTO: ABNORMAL /HPF

## 2024-04-02 PROCEDURE — 99213 OFFICE O/P EST LOW 20 MIN: CPT | Performed by: INTERNAL MEDICINE

## 2024-04-02 PROCEDURE — 85025 COMPLETE CBC W/AUTO DIFF WBC: CPT | Performed by: INTERNAL MEDICINE

## 2024-04-02 PROCEDURE — 83540 ASSAY OF IRON: CPT | Performed by: INTERNAL MEDICINE

## 2024-04-02 PROCEDURE — 83550 IRON BINDING TEST: CPT | Performed by: INTERNAL MEDICINE

## 2024-04-02 PROCEDURE — 36415 COLL VENOUS BLD VENIPUNCTURE: CPT | Performed by: INTERNAL MEDICINE

## 2024-04-02 PROCEDURE — 81001 URINALYSIS AUTO W/SCOPE: CPT

## 2024-04-02 PROCEDURE — 84443 ASSAY THYROID STIM HORMONE: CPT | Performed by: INTERNAL MEDICINE

## 2024-04-02 PROCEDURE — 99395 PREV VISIT EST AGE 18-39: CPT | Performed by: INTERNAL MEDICINE

## 2024-04-02 PROCEDURE — 80053 COMPREHEN METABOLIC PANEL: CPT | Performed by: INTERNAL MEDICINE

## 2024-04-02 PROCEDURE — 82728 ASSAY OF FERRITIN: CPT | Performed by: INTERNAL MEDICINE

## 2024-04-02 NOTE — TELEPHONE ENCOUNTER
PA for tirzepatide (Mounjaro) 5 MG/0.5ML     Submitted via    [][employer]-KEY   [x][employer]-Case ID # [id number]   []Faxed to plan   []Other website   []Phone call Case ID #     Office notes sent, clinical questions answered. Awaiting determination    Turnaround time for your insurance to make a decision on your Prior Authorization can take 7-21 business days.

## 2024-04-03 ENCOUNTER — TELEPHONE (OUTPATIENT)
Dept: FAMILY MEDICINE CLINIC | Facility: CLINIC | Age: 39
End: 2024-04-03

## 2024-04-03 NOTE — TELEPHONE ENCOUNTER
----- Message from Yamila Galvin MD sent at 4/3/2024  2:08 PM EDT -----  Please set her up for virtual visit to discuss abnormal labs

## 2024-04-04 ENCOUNTER — TELEMEDICINE (OUTPATIENT)
Dept: FAMILY MEDICINE CLINIC | Facility: CLINIC | Age: 39
End: 2024-04-04
Payer: COMMERCIAL

## 2024-04-04 DIAGNOSIS — D50.9 IRON DEFICIENCY ANEMIA, UNSPECIFIED IRON DEFICIENCY ANEMIA TYPE: ICD-10-CM

## 2024-04-04 DIAGNOSIS — R73.09 ELEVATED HEMOGLOBIN A1C: ICD-10-CM

## 2024-04-04 PROCEDURE — 99213 OFFICE O/P EST LOW 20 MIN: CPT | Performed by: INTERNAL MEDICINE

## 2024-04-04 RX ORDER — FERROUS SULFATE 324(65)MG
TABLET, DELAYED RELEASE (ENTERIC COATED) ORAL
Qty: 60 TABLET | Refills: 0 | Status: SHIPPED | OUTPATIENT
Start: 2024-04-04

## 2024-04-04 RX ORDER — MULTIVIT WITH MINERALS/LUTEIN
TABLET ORAL
Qty: 60 TABLET | Refills: 0 | Status: SHIPPED | OUTPATIENT
Start: 2024-04-04

## 2024-04-04 RX ORDER — MULTIVIT-MIN/IRON/FOLIC ACID/K 18-600-40
CAPSULE ORAL
Qty: 30 CAPSULE | Refills: 3 | Status: SHIPPED | OUTPATIENT
Start: 2024-04-04

## 2024-04-04 NOTE — PROGRESS NOTES
Virtual Regular Visit    Verification of patient location:    Patient is located at Home in the following state in which I hold an active license [place]      Assessment/Plan:    Problem List Items Addressed This Visit       Anemia    Relevant Orders    Ambulatory Referral to Gastroenterology    Iron Panel (Includes Ferritin, Iron Sat%, Iron, and TIBC)    CBC and differential     Other Visit Diagnoses       Elevated hemoglobin A1c        Relevant Orders    Comprehensive metabolic panel    Hemoglobin A1C                 Reason for visit is   Chief Complaint   Patient presents with    Virtual Regular Visit          Encounter provider Yamila Galvin MD    Provider located at [place] [address]  [facility] [address] [phone]      Recent Visits  Date Type Provider Dept   04/03/24 Telephone Calli Lind Pg [facility]   04/02/24 Office Visit Yamila Galvin MD [facility]   Showing recent visits within past 7 days and meeting all other requirements  Today's Visits  Date Type Provider Dept   04/04/24 Telemedicine Yamila Galvin MD [facility]   Showing today's visits and meeting all other requirements  Future Appointments  No visits were found meeting these conditions.  Showing future appointments within next 150 days and meeting all other requirements       The patient was identified by name and date of birth. Laly Mclean was informed that this is a telemedicine visit and that the visit is being conducted through the [employer] platform. She agrees to proceed..  My office door was closed. No one else was in the room.  She acknowledged consent and understanding of privacy and security of the video platform. The patient has agreed to participate and understands they can discontinue the visit at any time.    Patient is aware this is a billable service.     Subjective  Laly Mclean is a 38 y.o.  female  .  Patient is being seen through telehealth to discuss recent lab studies.  CBC has not improved.  It was 9.8 and is 10.7 now.  Iron levels are still low including ferritin.  He has been taking iron supplement with vitamin C since last year.  Her stool for occult blood was negative.  She does not report any dyspepsia or abdominal pain constipation or weight loss.  She reports no heavy menstrual cycle.  However given above GI consultation for an EGD and a colonoscopy.  I will renew her prescription of iron and vitamin C.  Follow-up blood work in a couple of months.  He was also noted to have elevated hemoglobin A1c.  Is 5.9 now.  Blood sugar iron of the normal at 98.  She has family history of diabetes mellitus.  Patient encouraged to watch her diet closely.  She was also started on Mounjaro to help with weight loss.  I will  repeat blood work in a couple of months.    HPI     Past Medical History:   Diagnosis Date    Anemia     iron def. anemia    Fibroid     Gestational diabetes mellitus (GDM) in first trimester 2019    Migraine without aura and without status migrainosus, not intractable 3/12/2023    Pre-eclampsia, delivered, with postpartum complication 2020    Varicella        Past Surgical History:   Procedure Laterality Date     SECTION      MYOMECTOMY      [place]  DELIVERY ONLY N/A 2020    Procedure:  SECTION () REPEAT;  Surgeon: Sallie Escobedo MD;  Location: [facility];  Service: Obstetrics       Current Outpatient Medications   Medication Sig Dispense Refill    ascorbic acid (VITAMIN C) 250 mg tablet Take vitamin C with iron every other day 60 tablet 0    Cholecalciferol (Vitamin D) 50 MCG (2000 UT) CAPS 1 tablet a day 30 capsule 3    ferrous sulfate 324 (65 Fe) mg 1 pill every other day with vitamin C 60 tablet 0    fluticasone (FLONASE) 50 mcg/act nasal spray 1 spray into each nostril daily 16 g 1    ibuprofen (MOTRIN) 600 mg tablet Take 1 tablet (600 mg  total) by mouth every 6 (six) hours as needed for moderate pain (Patient not taking: Reported on 4/4/2023) 50 tablet 1    loratadine (Claritin) 10 mg tablet Take 1 tablet (10 mg total) by mouth daily (Patient not taking: Reported on 4/4/2023) 90 tablet 1    norethindrone (MICRONOR) 0.35 MG tablet Take 1 tablet (0.35 mg total) by mouth daily (Patient not taking: Reported on 3/10/2021) 90 tablet 3    tirzepatide (Mounjaro) 5 MG/0.5ML Inject 0.5 mL (5 mg total) under the skin every 7 days 2 mL 0     No current facility-administered medications for this visit.        No Known Allergies    Review of Systems    Video Exam    There were no vitals filed for this visit.    Physical Exam     Visit Time  Total Visit Duration: 15

## 2024-04-25 ENCOUNTER — TELEPHONE (OUTPATIENT)
Dept: FAMILY MEDICINE CLINIC | Facility: CLINIC | Age: 39
End: 2024-04-25

## 2024-04-25 NOTE — TELEPHONE ENCOUNTER
[facility]  Physical Therapy Initial Evaluation    Name: Celia Alford  Clinic Number: [id number]    Therapy Diagnosis: R shoulder pain, shoulder dyskinesia, decreased shoulder ROM  Physician: Jens Han Jr., *    Physician Orders: PT Eval and Treat   Medical Diagnosis from Referral: S46.911D (ICD-10-CM) - Strain of right shoulder, subsequent encounter  Evaluation Date: 2/17/2020;  Authorization Period Expiration: 12/31/20  Plan of Care Expiration: 4/30/20  Visit # / Visits authorized: 1/ 20    Time In: 807 am  Time Out: 900 am  Total Billable Time: 53  Minutes :  Eval, MT, TE, MHP x 10 min  Precautions: Standard    Subjective   Date of onset: 7 months ago with fall at work around July 2019  History of current condition - Celia reports:   Per MD report: 11/6/2019 with Dr. Han: Celia Alford is a  46 y.o. female presenting today for right shoulder pain.  There was a history of trauma.  Onset of symptoms began about 3 months ago,in July 2019 when she fell at work sustained injury to her right shoulder  X-rays at that time negative for fracture  However she has had ongoing pain in the right shoulder  She is currently in therapy but thinks that therapy may be making her symptoms worse due to overly aggressive stretch, and use of dry needling was not effective which has been discontinued and steroid injection towards the end of the year has made minimal improvement  She is currently on light duty work.      Medical History:   No past medical history on file.    Surgical History:   Celia Alford  has a past surgical history that includes Tonsillectomy.    Medications:   Celia has a current medication list which includes the following prescription(s): acetaminophen, etodolac, fluticasone propionate, medroxyprogesterone, medroxyprogesterone, medroxyprogesterone, medroxyprogesterone, mupirocin, nabumetone, and naproxen.    Allergies:   Review of patient's allergies indicates:   Allergen  Patient states that the pharmacy don't have Mounjaro  5 ml they have 15 patient states that she was on that in past. Please advise thank you.   Reactions    Pcn [penicillins] Hives and Rash        Imaging, :   Reading Physician Reading Date Result Priority   Miguel Mcginnis MD 7/19/2019       Narrative     EXAMINATION:  XR SHOULDER TRAUMA 3 VIEW LEFT    TECHNIQUE:  Three views of the left shoulder were obtained, with AP, lateral, and axillary projections submitted.    COMPARISON:  No relevant comparison examinations are currently available.  Information obtained from the electronic medical record indicates a history of trauma on 07/18/2019.    FINDINGS:  Visualized osseous structures appear intact, with no definite evidence of recent fracture or other significant abnormality identified.  No glenohumeral dislocation.      Impression       As above      Electronically signed by: Miguel Mcginnis MD  Date: 07/19/2019  Time: 12:36            Prior Therapy: yes  Social History:  lives alone  Occupation:   Prior Level of Function: community level ambulation no restriction at work  Current Level of Function: community level ambulation on light duty due to shoulder injury    Pain:  Current 3/10, worst 6/10, best 2/10   Location: right shoulder   Description: Aching, Burning and Tight  Aggravating Factors: use of R arm especially overhead and reaching across the body  Easing Factors: relaxation, pain medication and heating pad    Pts goals: pt to be pain free for most of the day work with equal use of B arms and not overuse of left arm    Objective     AROM/PROM    Cervical  WFL in flex and ext and bilateral rotation but painful on R side with right side rotation.    Shoulder pain at end ROM on right  Flex R 165/170 L WNL  Ext R WFL  L WNL  IR R WFL  L WNL  ER R 60/65 L WNL  ABD  R 150/170 L WNL      Strength at shoulder    Flex R 3-/5  L  5/5  Ext R 4/5  L 5/5  IR R 4/5  L 5/5  ER R 4-/5  L 5/5  ABD     R 3-/5  L 5/5    Alar ligament intact  Empty can positive R  Speeds positive R  Cervical compression NP    Palpation and joint play R sided unless designated  B/bilateral, rhomboids, levator, teres minor, suboccipitals B, scalenes, B, SCM B, supraspinatus tendon, long head biceps tendon, medial and long head triceps, forearm flexor,       CMS Impairment/Limitation/Restriction for FOTO shoulder Survey    Therapist reviewed FOTO scores for Celia Alford on 2/17/2020.   FOTO documents entered into [employer] - see Media section.    Limitation Score: 43%  Category: carrying and occupational functional activities    Current : CK = at least 40% but < 60% impaired, limited or restricted  Goal: CJ = at least 20% but < 40% impaired, limited or restricted  Discharge:          TREATMENT   Treatment Time In: 820 am  Treatment Time Out: 900 am  Total Treatment time separate from Evaluation: 40 minutes    Celia received therapeutic exercises to develop strength, endurance and ROM for 8 minutes including: cervical 1 set and shoulder exercises demo only as noted.    Cervical  AROM slow and controlled  Flex and extension 1-3 x 20 reps pause for 1 seconds at end ROM   Rotation left to right 1-3 x 20 reps pause for 1 second at end ROM  Chin tucks 1-3 x 20 reps pause for 1 second at end ROM    Shoulder all start with performing and holding shoulder retraction  Shoulder retraction 20x hold 1 sec  Shoulder extension with YTB 3 x10 reps demo only  Shoulder rows with Otb 3 x10  Reps demo only  Bilateral ER with OTB 3 x10 reps demo only     Celia received the following manual therapy techniques: Manual traction, Myofacial release and Soft tissue Mobilization were applied to the: Levator, supraspinatus, teres minor, scalenes, SCM, suboccipital for 17 minutes, including:  Cervical traction grade II  GH distraction and caudal traction grade II    Celia received hot pack for 10 minutes to cervical and R shoulder.      Home Exercises and Patient Education Provided    Education provided:   - HEP, pain management and sleeping position    Written Home Exercises Provided: Patient instructed to cont  prior HEP.  Exercises were reviewed and Celia was able to demonstrate them prior to the end of the session.  Celia demonstrated good  understanding of the education provided.     See EMR under Patient Instructions for exercises provided 2/17/2020 and pt reported independence with exercises from prior PT sessions a few months ago.    Assessment   Celia is a 46 y.o. female referred to outpatient Physical Therapy with a medical diagnosis of R shoulder strain. Pt presents with R shoulder guarding and impaired mechanics and dysfunction since fall 7 months ago. Pt with good overall AROM but pain at end ROM and decreased overall endurance and strength due to pain.  Pt with prior attempts with steroid injection and rehab services to include dry needling and cupping which has not been successful or yielded relief. Pt with decreased guarding after session especially with levator, rhomboids, and teres minor.    Pt prognosis is Good.   Pt will benefit from skilled outpatient Physical Therapy to address the deficits stated above and in the chart below, provide pt/family education, and to maximize pt's level of independence.     Plan of care discussed with patient: Yes  Pt's spiritual, cultural and educational needs considered and patient is agreeable to the plan of care and goals as stated below:     Anticipated Barriers for therapy: chronic nature of injury at this point and occupational duties.    Medical Necessity is demonstrated by the following  History  Co-morbidities and personal factors that may impact the plan of care Co-morbidities:   young age    Personal Factors:   none     low   Examination  Body Structures and Functions, activity limitations and participation restrictions that may impact the plan of care Body Regions:   neck  upper extremities    Body Systems:    ROM  strength  gross coordinated movement    Participation Restrictions:   none    Activity limitations:   Learning and applying knowledge  no  deficits    General Tasks and Commands  no deficits    Communication  no deficits    Mobility  lifting and carrying objects  fine hand use (grasping/picking up)  driving (bike, car, motorcycle)    Self care  washing oneself (bathing, drying, washing hands)  dressing    Domestic Life  shopping  cooking  doing house work (cleaning house, washing dishes, laundry)    Interactions/Relationships  no deficits    Life Areas  no deficits    Community and Social Life  recreation and leisure         moderate   Clinical Presentation stable and uncomplicated moderate   Decision Making/ Complexity Score: moderate     Goals:    Short Term Goals: 3 weeks   1.  Pt to be independent with HEP   2.  Pt to decrease pain after session to 2/10  3.  Pt to increase AROM at R shoulder to 170 without pain  4.  Pt to increase activity tolerance to 45 min of shopping or housework without increased pain for increased functional activity      Long Term Goals: 10 weeks   1.  Pt to be independent with sleeping position and verbalize 2 pain management strategies.  2.  Pt to decrease pain  to 2/10 over 50% of the work day with light duty to include overhead activities.  3.  Pt to increase AROM at R shoulder abduction  to 165 without pain  4.  Pt to increase activity tolerance to 4 hrs continuous work without increased pain to start the day.  5.  Pt to decrease limitation score to less than 25% for increased functional mobility and improved pain.  6.  Pt to perform ADLs dressing and self care in the morning without compensation and no increased pain for increased functional activities.    Plan   Plan of care Certification: 2/17/2020 to 4/31/20.    Outpatient Physical Therapy 2 times weekly for 10 weeks to include the following interventions: Cervical/Lumbar Traction, Electrical Stimulation, kinesiotape, Manual Therapy, Moist Heat/ Ice, Patient Education and Therapeutic Exercise.     Darya Chou, PT

## 2024-05-30 NOTE — TELEPHONE ENCOUNTER
Medication: Mounjaro    Dose/Frequency: 5mg/0.5ml every 7 days    Quantity: 2ml    [facility]    Office:   [x] PCP/Provider -   [] Speciality/Provider -     Does the patient have enough for 3 days?   [] Yes   [x] No - Send as HP to POD    Pt's pays out of pocket for medication

## 2024-06-03 ENCOUNTER — TELEPHONE (OUTPATIENT)
Dept: FAMILY MEDICINE CLINIC | Facility: CLINIC | Age: 39
End: 2024-06-03

## 2024-06-03 ENCOUNTER — TELEPHONE (OUTPATIENT)
Dept: GASTROENTEROLOGY | Facility: MEDICAL CENTER | Age: 39
End: 2024-06-03

## 2024-06-03 ENCOUNTER — OFFICE VISIT (OUTPATIENT)
Dept: GASTROENTEROLOGY | Facility: MEDICAL CENTER | Age: 39
End: 2024-06-03
Payer: COMMERCIAL

## 2024-06-03 VITALS
TEMPERATURE: 96.7 F | HEART RATE: 72 BPM | DIASTOLIC BLOOD PRESSURE: 94 MMHG | BODY MASS INDEX: 27.21 KG/M2 | WEIGHT: 153.6 LBS | SYSTOLIC BLOOD PRESSURE: 138 MMHG

## 2024-06-03 DIAGNOSIS — D50.9 IRON DEFICIENCY ANEMIA, UNSPECIFIED IRON DEFICIENCY ANEMIA TYPE: ICD-10-CM

## 2024-06-03 PROCEDURE — 99244 OFF/OP CNSLTJ NEW/EST MOD 40: CPT | Performed by: INTERNAL MEDICINE

## 2024-06-03 NOTE — LETTER
Bella 3, 2024       No Recipients    Patient: Laly Mclean   YOB: 1985   Date of Visit: 6/3/2024       Dear Dr. Galvin:    Thank you for referring Laly Mclean to me for evaluation. Below are my notes for this consultation.    If you have questions, please do not hesitate to call me. I look forward to following your patient along with you.         Sincerely,        Jose Roberto Romano MD        CC:   No Recipients    Jose Roberto Romano MD  6/3/2024 10:34 AM  Incomplete    Outpatient Consultation  [facility] [place] [address]  [address]  [facility] [address]  Jose Roberto Romano M.D.  Ph : [phone]  Fax : [phone]  Email : [email or web address]  Also available on Tiger Text      Laly Mclean 38 y.o. female MRN: [id number]    PCP: Yamila Galvin MD  Referring: Yamila Galvin MD  [address]  [address]  [place] [address]    Laly Mclean was seen in consultation. My recommendations are included. Please do not hesitate to contact me with any questions you may have.       ASSESSMENT AND PLAN:      No problem-specific Assessment & Plan notes found for this encounter.      Diagnoses and all orders for this visit:    Iron deficiency anemia, unspecified iron deficiency anemia type  -     Ambulatory Referral to Gastroenterology        Iron deficiency anemia (chronic) - likely related to her menorrhagia. However, Dr. Galvin's concern is that the levels are not improving despite therapy.   Discussed that she could have the EGD and Colonoscopy to rule out a GI issue and to discuss this further with Dr. Galvin. If we do the EGD and colonoscopy we will recommend gastric and duodenal biopsies to rule out H. Pylori and celiac disease (though less likelihood).   Recommend IV infusion to see if that helps.   Constipation   Prunelax  Fiber supplement    I spent 25 mins in chart review, face to face contact and in coordination of care.    ______________________________________________________________________    HPI:  38 y.o. year old who presents with Consult (Pt is low of hemoglobin ,ferratin and iron )       She is from [place] and has had anemia for several years. [place] in  and has had the diagnosis even before that.   She does have heavy periods. She does take NSAIDS intermittently for these.   She is on iron and is on vitamin D.   She does get some constipation and take Prunelax.   She may go 3 - 4 days if she does not take the laxative.   She does not eat much vegetables.   No bleeding in the stools - occult stool was also negative.   No heartburn or reflux. No dysphagia.   No family h/o H. Pylori.     She has had a myomectomy and C-Sections.     Lab Results:     Reviewed : yes - has had iron deficiency for many years along anemia (as back as ).     Radiology Results:     I have not personally reviewed the imaging studies.     Smoking : no    Alcohol : no    Family history of colon cancer : no    Family history of colon polyps : no    Family history of other gastrointestinal malignancy : no    PRIOR ENDOSCOPIC EVALUATION :     Endoscopy : no    Colonoscopy : no    Last colonoscopy : Not on file   Last Cologuard: Not on file          REVIEW OF SYSTEMS:    Please see the HPI for gastrointestinal and other pertinent review of systems.  Otherwise remainder of the review of systems was negative and noncontributory to the chief complaint.      Historical Information  Past Medical History:   Diagnosis Date   • Anemia     iron def. anemia   • Fibroid    • Gestational diabetes mellitus (GDM) in first trimester 2019   • Migraine without aura and without status migrainosus, not intractable 3/12/2023   • Pre-eclampsia, delivered, with postpartum complication 2020   • Varicella      Past Surgical History:   Procedure Laterality Date   •  SECTION     • MYOMECTOMY     • [place]  DELIVERY ONLY N/A 2020     Procedure:  SECTION () REPEAT;  Surgeon: Sallie Escobedo MD;  [facility]: [facility];  Service: Obstetrics     Social History  Social History     Substance and Sexual Activity   Alcohol Use Not Currently     Social History     Substance and Sexual Activity   Drug Use Never     Social History     Tobacco Use   Smoking Status Never   Smokeless Tobacco Never     Family History   Problem Relation Age of Onset   • Hypertension Mother    • Diabetes Mother    • Hypertension Father    • Hypertension Sister    • No Known Problems Sister    • No Known Problems Brother    • No Known Problems Brother    • Autism Daughter    • Dementia Maternal Grandmother    • Diabetes Paternal Grandfather        Meds/Allergies      Current Outpatient Medications:   •  ascorbic acid (VITAMIN C) 250 mg tablet  •  Cholecalciferol (Vitamin D) 50 MCG (2000 UT) CAPS  •  ferrous sulfate 324 (65 Fe) mg  •  fluticasone (FLONASE) 50 mcg/act nasal spray  •  tirzepatide (Mounjaro) 7.5 MG/0.5ML  •  ibuprofen (MOTRIN) 600 mg tablet  •  loratadine (Claritin) 10 mg tablet  •  norethindrone (MICRONOR) 0.35 MG tablet    No Known Allergies        Objective    Blood pressure 138/94, pulse 72, temperature (!) 96.7 °F (35.9 °C), weight 69.7 kg (153 lb 9.6 oz), last menstrual period 10/05/2023, currently breastfeeding. Body mass index is 27.21 kg/m².     PHYSICAL EXAM:      Physical Exam  Constitutional:       Appearance: Normal appearance. She is well-developed.   HENT:      Head: Normocephalic and atraumatic.   Eyes:      General: No scleral icterus.     Conjunctiva/sclera: Conjunctivae normal.      Pupils: Pupils are equal, round, and reactive to light.   Cardiovascular:      Rate and Rhythm: Normal rate and regular rhythm.      Heart sounds: Normal heart sounds.   Pulmonary:      Effort: Pulmonary effort is normal. No respiratory distress.      Breath sounds: Normal breath sounds.   Abdominal:      General: Bowel sounds are normal. There is no  distension.      Palpations: Abdomen is soft. There is no mass.      Tenderness: There is no abdominal tenderness.      Hernia: No hernia is present.   Musculoskeletal:         General: Normal range of motion.      Cervical back: Normal range of motion.   Lymphadenopathy:      Cervical: No cervical adenopathy.   Skin:     General: Skin is warm.   Neurological:      Mental Status: She is alert and oriented to person, place, and time.   Psychiatric:         Behavior: Behavior normal.         Thought Content: Thought content normal.              Jose Roberto Romano MD  6/3/2024 10:19 AM  Sign when Signing Visit    Outpatient Consultation  [facility] [address]  [address]  [facility] [address]  Jose Roberto Romano M.D.  Ph : [phone]  Fax : [phone]  Email : [email or web address]  Also available on [employer] Text      Laly Mclean 38 y.o. female MRN: [id number]    PCP: Yamila Galvin MD  Referring: Yamila Galvin MD  [address]  [address]  [place] [address]    Laly Mclean was seen in consultation. My recommendations are included. Please do not hesitate to contact me with any questions you may have.       ASSESSMENT AND PLAN:      No problem-specific Assessment & Plan notes found for this encounter.      Diagnoses and all orders for this visit:    Iron deficiency anemia, unspecified iron deficiency anemia type  -     Ambulatory Referral to Gastroenterology        ***    I spent *** mins in chart review, face to face contact and in coordination of care.   ______________________________________________________________________    HPI:  38 y.o. year old who presents with Consult (Pt is low of hemoglobin ,ferratin and iron )       ***    Lab Results:     Reviewed : {YES/NO:20200}    Radiology Results:     I {Have have not:[id number]} personally reviewed the imaging studies.     Smoking : {YES/NO:20200}    Alcohol : {YES/NO:20200}    Family history of colon  cancer : {YES/NO:}    Family history of colon polyps : {YES/NO:}    Family history of other gastrointestinal malignancy : {YES/NO:}    PRIOR ENDOSCOPIC EVALUATION :     Endoscopy : {YES/NO:}    Colonoscopy : {YES/NO:}    Last colonoscopy : Not on file   Last Cologuard: Not on file          REVIEW OF SYSTEMS:    ***    Historical Information  Past Medical History:   Diagnosis Date   • Anemia     iron def. anemia   • Fibroid    • Gestational diabetes mellitus (GDM) in first trimester 2019   • Migraine without aura and without status migrainosus, not intractable 3/12/2023   • Pre-eclampsia, delivered, with postpartum complication 2020   • Varicella      Past Surgical History:   Procedure Laterality Date   •  SECTION     • MYOMECTOMY     • [place]  DELIVERY ONLY N/A 2020    Procedure:  SECTION () REPEAT;  Surgeon: Sallie Escobedo MD;  Location[facility];  Service: Obstetrics     Social History  Social History     Substance and Sexual Activity   Alcohol Use Not Currently     Social History     Substance and Sexual Activity   Drug Use Never     Social History     Tobacco Use   Smoking Status Never   Smokeless Tobacco Never     Family History   Problem Relation Age of Onset   • Hypertension Mother    • Diabetes Mother    • Hypertension Father    • Hypertension Sister    • No Known Problems Sister    • No Known Problems Brother    • No Known Problems Brother    • Autism Daughter    • Dementia Maternal Grandmother    • Diabetes Paternal Grandfather        Meds/Allergies      Current Outpatient Medications:   •  ascorbic acid (VITAMIN C) 250 mg tablet  •  Cholecalciferol (Vitamin D) 50 MCG (2000 UT) CAPS  •  ferrous sulfate 324 (65 Fe) mg  •  fluticasone (FLONASE) 50 mcg/act nasal spray  •  tirzepatide (Mounjaro) 7.5 MG/0.5ML  •  ibuprofen (MOTRIN) 600 mg tablet  •  loratadine (Claritin) 10 mg tablet  •  norethindrone (MICRONOR) 0.35 MG tablet    No Known  Allergies        Objective    Blood pressure 138/94, pulse 72, temperature (!) 96.7 °F (35.9 °C), weight 69.7 kg (153 lb 9.6 oz), last menstrual period 10/05/2023, currently breastfeeding. Body mass index is 27.21 kg/m².     PHYSICAL EXAM:      Physical Exam

## 2024-06-03 NOTE — LETTER
Bella 3, 2024     Yamila Galvin MD  [address]  [address]  [facility] [address]    Patient: Laly Mclean   YOB: 1985   Date of Visit: 6/3/2024       Dear Dr. Galvin:    Thank you for referring Laly Mclean to me for evaluation. Below are my notes for this consultation.    If you have questions, please do not hesitate to call me. I look forward to following your patient along with you.         Sincerely,        Jose Roberto Romano MD        CC: No Recipients    Jose Roberto Romano MD  6/3/2024 10:35 AM  Sign when Signing Visit    Outpatient Consultation  [facility] [place] [address]  [address]  [facility] [address]  Jose Roberto Romano M.D.  Ph : [phone]  Fax : [phone]  Email : [email or web address]  Also available on Tiger Text      Laly Mclean 38 y.o. female MRN: [id number]    PCP: Yamila Galvin MD  Referring: Yamila Galvin MD  [address]  [address]  [place] [address]    Laly Mclean was seen in consultation. My recommendations are included. Please do not hesitate to contact me with any questions you may have.       ASSESSMENT AND PLAN:      No problem-specific Assessment & Plan notes found for this encounter.      Diagnoses and all orders for this visit:    Iron deficiency anemia, unspecified iron deficiency anemia type  -     Ambulatory Referral to Gastroenterology  -     Colonoscopy; Future  -     EGD; Future    Other orders  -     Diet NPO; Sips with meds; Standing  -     Void on call to OR; Standing  -     Insert peripheral IV; Standing        Iron deficiency anemia (chronic) - likely related to her menorrhagia. However, Dr. Galvin's concern is that the levels are not improving despite therapy.   Discussed that she could have the EGD and Colonoscopy to rule out a GI issue and to discuss this further with Dr. Galvin. If we do the EGD and colonoscopy we will recommend gastric and duodenal biopsies to rule out H. Pylori and  celiac disease (though less likelihood).   Recommend IV infusion to see if that helps.   Constipation   Prunelax  Fiber supplement    I spent 25 mins in chart review, face to face contact and in coordination of care.   ______________________________________________________________________    HPI:  38 y.o. year old who presents with Consult (Pt is low of hemoglobin ,ferratin and iron )       She is from [facility] and has had anemia for several years. [place] in [place] and has had the diagnosis even before that.   She does have heavy periods. She does take NSAIDS intermittently for these.   She is on iron and is on vitamin D.   She does get some constipation and take Prunelax.   She may go 3 - 4 days if she does not take the laxative.   She does not eat much vegetables.   No bleeding in the stools - occult stool was also negative.   No heartburn or reflux. No dysphagia.   No family h/o H. Pylori.     She has had a myomectomy and C-Sections.     Lab Results:     Reviewed : yes - has had iron deficiency for many years along anemia (as back as 2014).     Radiology Results:     I have not personally reviewed the imaging studies.     Smoking : no    Alcohol : no    Family history of colon cancer : no    Family history of colon polyps : no    Family history of other gastrointestinal malignancy : no    PRIOR ENDOSCOPIC EVALUATION :     Endoscopy : no    Colonoscopy : no    Last colonoscopy : Not on file   Last Cologuard: Not on file          REVIEW OF SYSTEMS:    Please see the HPI for gastrointestinal and other pertinent review of systems.  Otherwise remainder of the review of systems was negative and noncontributory to the chief complaint.      Historical Information  Past Medical History:   Diagnosis Date   • Anemia     iron def. anemia   • Fibroid    • Gestational diabetes mellitus (GDM) in first trimester 12/23/2019   • Migraine without aura and without status migrainosus, not intractable 3/12/2023   •  Pre-eclampsia, delivered, with postpartum complication 2020   • Varicella      Past Surgical History:   Procedure Laterality Date   •  SECTION     • MYOMECTOMY     • [place]  DELIVERY ONLY N/A 2020    Procedure:  SECTION () REPEAT;  Surgeon: Sallie Escobedo MD;  Location: [facility];  Service: Obstetrics     Social History  Social History     Substance and Sexual Activity   Alcohol Use Not Currently     Social History     Substance and Sexual Activity   Drug Use Never     Social History     Tobacco Use   Smoking Status Never   Smokeless Tobacco Never     Family History   Problem Relation Age of Onset   • Hypertension Mother    • Diabetes Mother    • Hypertension Father    • Hypertension Sister    • No Known Problems Sister    • No Known Problems Brother    • No Known Problems Brother    • Autism Daughter    • Dementia Maternal Grandmother    • Diabetes Paternal Grandfather        Meds/Allergies      Current Outpatient Medications:   •  ascorbic acid (VITAMIN C) 250 mg tablet  •  Cholecalciferol (Vitamin D) 50 MCG (2000 UT) CAPS  •  ferrous sulfate 324 (65 Fe) mg  •  fluticasone (FLONASE) 50 mcg/act nasal spray  •  tirzepatide (Mounjaro) 7.5 MG/0.5ML  •  ibuprofen (MOTRIN) 600 mg tablet  •  loratadine (Claritin) 10 mg tablet  •  norethindrone (MICRONOR) 0.35 MG tablet    No Known Allergies        Objective    Blood pressure 138/94, pulse 72, temperature (!) 96.7 °F (35.9 °C), weight 69.7 kg (153 lb 9.6 oz), last menstrual period 10/05/2023, currently breastfeeding. Body mass index is 27.21 kg/m².     PHYSICAL EXAM:      Physical Exam  Constitutional:       Appearance: Normal appearance. She is well-developed.   HENT:      Head: Normocephalic and atraumatic.   Eyes:      General: No scleral icterus.     Conjunctiva/sclera: Conjunctivae normal.      Pupils: Pupils are equal, round, and reactive to light.   Cardiovascular:      Rate and Rhythm: Normal rate and regular rhythm.      Heart  sounds: Normal heart sounds.   Pulmonary:      Effort: Pulmonary effort is normal. No respiratory distress.      Breath sounds: Normal breath sounds.   Abdominal:      General: Bowel sounds are normal. There is no distension.      Palpations: Abdomen is soft. There is no mass.      Tenderness: There is no abdominal tenderness.      Hernia: No hernia is present.   Musculoskeletal:         General: Normal range of motion.      Cervical back: Normal range of motion.   Lymphadenopathy:      Cervical: No cervical adenopathy.   Skin:     General: Skin is warm.   Neurological:      Mental Status: She is alert and oriented to person, place, and time.   Psychiatric:         Behavior: Behavior normal.         Thought Content: Thought content normal.

## 2024-06-03 NOTE — TELEPHONE ENCOUNTER
Procedure: EGD/Colonoscopy  Date: 08/29/2024  Physician performing: Dr. Romano  Location of procedure:  [facility]  Instructions given to patient: Yes  Diabetic: Yes  Clearances: N/A

## 2024-06-03 NOTE — PROGRESS NOTES
Outpatient Consultation  [facility] [place]  [address]  [address]  [facility] [address]  Jose Roberto Romano M.D.  Ph : [phone]  Fax : [phone]  Email : [email or web address]  Also available on Tiger Text      Laly Mclean 38 y.o. female MRN: [id number]    PCP: Yamila Galvin MD  Referring: Yamila Galvin MD  [address]  [address]  [place] [address]    Laly Mclean was seen in consultation. My recommendations are included. Please do not hesitate to contact me with any questions you may have.       ASSESSMENT AND PLAN:      No problem-specific Assessment & Plan notes found for this encounter.      Diagnoses and all orders for this visit:    Iron deficiency anemia, unspecified iron deficiency anemia type  -     Ambulatory Referral to Gastroenterology  -     Colonoscopy; Future  -     EGD; Future    Other orders  -     Diet NPO; Sips with meds; Standing  -     Void on call to OR; Standing  -     Insert peripheral IV; Standing        Iron deficiency anemia (chronic) - likely related to her menorrhagia. However, Dr. Galvin's concern is that the levels are not improving despite therapy.   Discussed that she could have the EGD and Colonoscopy to rule out a GI issue and to discuss this further with Dr. Galvin. If we do the EGD and colonoscopy we will recommend gastric and duodenal biopsies to rule out H. Pylori and celiac disease (though less likelihood).   Recommend IV infusion to see if that helps.   Constipation   Prunelax  Fiber supplement    I spent 25 mins in chart review, face to face contact and in coordination of care.   ______________________________________________________________________    HPI:  38 y.o. year old who presents with Consult (Pt is low of hemoglobin ,ferratin and iron )       She is from [place] and has had anemia for several years. [place] and has had the diagnosis even before that.   She does have heavy  periods. She does take NSAIDS intermittently for these.   She is on iron and is on vitamin D.   She does get some constipation and take Prunelax.   She may go 3 - 4 days if she does not take the laxative.   She does not eat much vegetables.   No bleeding in the stools - occult stool was also negative.   No heartburn or reflux. No dysphagia.   No family h/o H. Pylori.     She has had a myomectomy and C-Sections.     Lab Results:     Reviewed : yes - has had iron deficiency for many years along anemia (as back as ).     Radiology Results:     I have not personally reviewed the imaging studies.     Smoking : no    Alcohol : no    Family history of colon cancer : no    Family history of colon polyps : no    Family history of other gastrointestinal malignancy : no    PRIOR ENDOSCOPIC EVALUATION :     Endoscopy : no    Colonoscopy : no    Last colonoscopy : Not on file   Last Cologuard: Not on file          REVIEW OF SYSTEMS:    Please see the HPI for gastrointestinal and other pertinent review of systems.  Otherwise remainder of the review of systems was negative and noncontributory to the chief complaint.      Historical Information   Past Medical History:   Diagnosis Date    Anemia     iron def. anemia    Fibroid     Gestational diabetes mellitus (GDM) in first trimester 2019    Migraine without aura and without status migrainosus, not intractable 3/12/2023    Pre-eclampsia, delivered, with postpartum complication 2020    Varicella      Past Surgical History:   Procedure Laterality Date     SECTION      MYOMECTOMY      [place]  DELIVERY ONLY N/A 2020    Procedure:  SECTION () REPEAT;  Surgeon: Sallie Escobedo MD;  Location: [facility];  Service: Obstetrics     Social History   Social History     Substance and Sexual Activity   Alcohol Use Not Currently     Social History     Substance and Sexual Activity   Drug Use Never     Social History     Tobacco Use   Smoking Status  Never   Smokeless Tobacco Never     Family History   Problem Relation Age of Onset    Hypertension Mother     Diabetes Mother     Hypertension Father     Hypertension Sister     No Known Problems Sister     No Known Problems Brother     No Known Problems Brother     Autism Daughter     Dementia Maternal Grandmother     Diabetes Paternal Grandfather        Meds/Allergies       Current Outpatient Medications:     ascorbic acid (VITAMIN C) 250 mg tablet    Cholecalciferol (Vitamin D) 50 MCG (2000 UT) CAPS    ferrous sulfate 324 (65 Fe) mg    fluticasone (FLONASE) 50 mcg/act nasal spray    tirzepatide (Mounjaro) 7.5 MG/0.5ML    ibuprofen (MOTRIN) 600 mg tablet    loratadine (Claritin) 10 mg tablet    norethindrone (MICRONOR) 0.35 MG tablet    No Known Allergies        Objective     Blood pressure 138/94, pulse 72, temperature (!) 96.7 °F (35.9 °C), weight 69.7 kg (153 lb 9.6 oz), last menstrual period 10/05/2023, currently breastfeeding. Body mass index is 27.21 kg/m².     PHYSICAL EXAM:      Physical Exam  Constitutional:       Appearance: Normal appearance. She is well-developed.   HENT:      Head: Normocephalic and atraumatic.   Eyes:      General: No scleral icterus.     Conjunctiva/sclera: Conjunctivae normal.      Pupils: Pupils are equal, round, and reactive to light.   Cardiovascular:      Rate and Rhythm: Normal rate and regular rhythm.      Heart sounds: Normal heart sounds.   Pulmonary:      Effort: Pulmonary effort is normal. No respiratory distress.      Breath sounds: Normal breath sounds.   Abdominal:      General: Bowel sounds are normal. There is no distension.      Palpations: Abdomen is soft. There is no mass.      Tenderness: There is no abdominal tenderness.      Hernia: No hernia is present.   Musculoskeletal:         General: Normal range of motion.      Cervical back: Normal range of motion.   Lymphadenopathy:      Cervical: No cervical adenopathy.   Skin:     General: Skin is warm.    Neurological:      Mental Status: She is alert and oriented to person, place, and time.   Psychiatric:         Behavior: Behavior normal.         Thought Content: Thought content normal.

## 2024-06-03 NOTE — TELEPHONE ENCOUNTER
Left message with pt to call the office back and schedule a follow up appointment per Dr. Galvin for her medication refill

## 2024-06-06 ENCOUNTER — APPOINTMENT (OUTPATIENT)
Dept: LAB | Facility: MEDICAL CENTER | Age: 39
End: 2024-06-06
Payer: COMMERCIAL

## 2024-06-06 ENCOUNTER — OFFICE VISIT (OUTPATIENT)
Dept: FAMILY MEDICINE CLINIC | Facility: CLINIC | Age: 39
End: 2024-06-06
Payer: COMMERCIAL

## 2024-06-06 VITALS
WEIGHT: 151.2 LBS | DIASTOLIC BLOOD PRESSURE: 88 MMHG | BODY MASS INDEX: 26.79 KG/M2 | RESPIRATION RATE: 16 BRPM | TEMPERATURE: 97.8 F | HEIGHT: 63 IN | SYSTOLIC BLOOD PRESSURE: 132 MMHG | OXYGEN SATURATION: 99 % | HEART RATE: 72 BPM

## 2024-06-06 DIAGNOSIS — R73.09 ELEVATED HEMOGLOBIN A1C: ICD-10-CM

## 2024-06-06 DIAGNOSIS — D50.9 IRON DEFICIENCY ANEMIA, UNSPECIFIED IRON DEFICIENCY ANEMIA TYPE: ICD-10-CM

## 2024-06-06 LAB
BASOPHILS # BLD AUTO: 0.01 THOUSANDS/ÂΜL (ref 0–0.1)
BASOPHILS NFR BLD AUTO: 0 % (ref 0–1)
EOSINOPHIL # BLD AUTO: 0.08 THOUSAND/ÂΜL (ref 0–0.61)
EOSINOPHIL NFR BLD AUTO: 2 % (ref 0–6)
ERYTHROCYTE [DISTWIDTH] IN BLOOD BY AUTOMATED COUNT: 16.2 % (ref 11.6–15.1)
EST. AVERAGE GLUCOSE BLD GHB EST-MCNC: 103 MG/DL
FERRITIN SERPL-MCNC: 18 NG/ML (ref 11–307)
HBA1C MFR BLD: 5.2 %
HCT VFR BLD AUTO: 34.8 % (ref 34.8–46.1)
HGB BLD-MCNC: 11.3 G/DL (ref 11.5–15.4)
IMM GRANULOCYTES # BLD AUTO: 0.01 THOUSAND/UL (ref 0–0.2)
IMM GRANULOCYTES NFR BLD AUTO: 0 % (ref 0–2)
LYMPHOCYTES # BLD AUTO: 3.12 THOUSANDS/ÂΜL (ref 0.6–4.47)
LYMPHOCYTES NFR BLD AUTO: 59 % (ref 14–44)
MCH RBC QN AUTO: 28.4 PG (ref 26.8–34.3)
MCHC RBC AUTO-ENTMCNC: 32.5 G/DL (ref 31.4–37.4)
MCV RBC AUTO: 87 FL (ref 82–98)
MONOCYTES # BLD AUTO: 0.37 THOUSAND/ÂΜL (ref 0.17–1.22)
MONOCYTES NFR BLD AUTO: 7 % (ref 4–12)
NEUTROPHILS # BLD AUTO: 1.68 THOUSANDS/ÂΜL (ref 1.85–7.62)
NEUTS SEG NFR BLD AUTO: 32 % (ref 43–75)
NRBC BLD AUTO-RTO: 0 /100 WBCS
PLATELET # BLD AUTO: 338 THOUSANDS/UL (ref 149–390)
PMV BLD AUTO: 10.6 FL (ref 8.9–12.7)
RBC # BLD AUTO: 3.98 MILLION/UL (ref 3.81–5.12)
WBC # BLD AUTO: 5.27 THOUSAND/UL (ref 4.31–10.16)

## 2024-06-06 PROCEDURE — 99213 OFFICE O/P EST LOW 20 MIN: CPT | Performed by: INTERNAL MEDICINE

## 2024-06-06 PROCEDURE — 36415 COLL VENOUS BLD VENIPUNCTURE: CPT

## 2024-06-06 PROCEDURE — 83550 IRON BINDING TEST: CPT | Performed by: INTERNAL MEDICINE

## 2024-06-06 PROCEDURE — 85025 COMPLETE CBC W/AUTO DIFF WBC: CPT | Performed by: INTERNAL MEDICINE

## 2024-06-06 PROCEDURE — 83540 ASSAY OF IRON: CPT | Performed by: INTERNAL MEDICINE

## 2024-06-06 PROCEDURE — 80053 COMPREHEN METABOLIC PANEL: CPT

## 2024-06-06 PROCEDURE — 83036 HEMOGLOBIN GLYCOSYLATED A1C: CPT

## 2024-06-06 PROCEDURE — 82728 ASSAY OF FERRITIN: CPT | Performed by: INTERNAL MEDICINE

## 2024-06-06 NOTE — PROGRESS NOTES
"Assessment/Plan:           Problem List Items Addressed This Visit       Anemia    Relevant Orders    Iron Panel (Includes Ferritin, Iron Sat%, Iron, and TIBC)    CBC and differential    Iron Panel (Includes Ferritin, Iron Sat%, Iron, and TIBC)    CBC and differential     Other Visit Diagnoses       BMI 26.0-26.9,adult        Relevant Medications    Semaglutide-Weight Management (WEGOVY) 0.25 MG/0.5ML              Subjective:      Patient ID: Laly Mclean is a 38 y.o. female.    HPI  Patient is here to follow-up iron deficiency anemia.  Patient has been having heavy menstrual cycle and will be working up with her gynecologist to get a sooner appointment.  She has been taking iron supplement.  I would recheck her CBC and iron levels.  If the levels are still suboptimal IV Venofer will be ordered.  For now EGD and colonoscopy is on hold.      I am also switching her from Mounjaro to Wegovy because of the  ridiculously high price of Mounjaro.    The following portions of the patient's history were reviewed and updated as appropriate: allergies, current medications, past family history, past medical history, past social history, past surgical history, and problem list.    Review of Systems      Objective:      /88 (BP Location: Left arm, Patient Position: Sitting, Cuff Size: Standard)   Pulse 72   Temp 97.8 °F (36.6 °C) (Tympanic)   Resp 16   Ht 5' 3\" (1.6 m)   Wt 68.6 kg (151 lb 3.2 oz)   LMP 10/05/2023 (Exact Date)   SpO2 99%   BMI 26.78 kg/m²          Physical Exam  Abdominal:      General: There is no distension.      Tenderness: There is no abdominal tenderness. There is no guarding.                 "

## 2024-06-07 ENCOUNTER — TELEPHONE (OUTPATIENT)
Age: 39
End: 2024-06-07

## 2024-06-07 DIAGNOSIS — D50.9 IRON DEFICIENCY ANEMIA, UNSPECIFIED IRON DEFICIENCY ANEMIA TYPE: Primary | ICD-10-CM

## 2024-06-07 LAB
ALBUMIN SERPL BCP-MCNC: 4.3 G/DL (ref 3.5–5)
ALP SERPL-CCNC: 41 U/L (ref 34–104)
ALT SERPL W P-5'-P-CCNC: 13 U/L (ref 7–52)
ANION GAP SERPL CALCULATED.3IONS-SCNC: 9 MMOL/L (ref 4–13)
AST SERPL W P-5'-P-CCNC: 22 U/L (ref 13–39)
BILIRUB SERPL-MCNC: 0.35 MG/DL (ref 0.2–1)
BUN SERPL-MCNC: 6 MG/DL (ref 5–25)
CALCIUM SERPL-MCNC: 9.7 MG/DL (ref 8.4–10.2)
CHLORIDE SERPL-SCNC: 105 MMOL/L (ref 96–108)
CO2 SERPL-SCNC: 25 MMOL/L (ref 21–32)
CREAT SERPL-MCNC: 0.65 MG/DL (ref 0.6–1.3)
GFR SERPL CREATININE-BSD FRML MDRD: 112 ML/MIN/1.73SQ M
GLUCOSE P FAST SERPL-MCNC: 81 MG/DL (ref 65–99)
IRON SATN MFR SERPL: 6 % (ref 15–50)
IRON SERPL-MCNC: 24 UG/DL (ref 50–212)
POTASSIUM SERPL-SCNC: 3.9 MMOL/L (ref 3.5–5.3)
PROT SERPL-MCNC: 7.3 G/DL (ref 6.4–8.4)
SODIUM SERPL-SCNC: 139 MMOL/L (ref 135–147)
TIBC SERPL-MCNC: 385 UG/DL (ref 250–450)
UIBC SERPL-MCNC: 361 UG/DL (ref 155–355)

## 2024-06-07 RX ORDER — SODIUM CHLORIDE 9 MG/ML
20 INJECTION, SOLUTION INTRAVENOUS ONCE
OUTPATIENT
Start: 2024-06-18

## 2024-06-07 NOTE — TELEPHONE ENCOUNTER
PA for Wegovy 0.25mg     Submitted via    []CMM-KEY   [[email or web address]-Case ID # [id number]   []Faxed to plan   []Other website   []Phone call Case ID #     Office notes sent, clinical questions answered. Awaiting determination    Turnaround time for your insurance to make a decision on your Prior Authorization can take 7-21 business days.             no

## 2024-06-10 NOTE — TELEPHONE ENCOUNTER
Pt called and asked if the semaglutide sent to a different pharmacy. Please send it [facility] - [place] - [id number] [facility] [[id number]]

## 2024-06-14 NOTE — TELEPHONE ENCOUNTER
Tanay from the PCP pod had the pt on the line and wanted to confirm that the prior auth was sent in.  I helped her locate it and told her to please let the pt know when it was sent and it may take her ins 7-21 day to respond

## 2024-06-14 NOTE — TELEPHONE ENCOUNTER
Pt called checking status of Wegovy.  Wegovy needs prior auth.  Encounter on 6/7 shows that prior authorization was started and waiting for response.    Explained to pt it can take 7-21 days for response

## 2024-06-18 ENCOUNTER — TELEPHONE (OUTPATIENT)
Dept: FAMILY MEDICINE CLINIC | Facility: CLINIC | Age: 39
End: 2024-06-18

## 2024-06-18 NOTE — TELEPHONE ENCOUNTER
Called patient left message appointment scheduled for iron infusion per :  6/21 at 11am  [facility], [address], [facility] [address], [place], [place], [address] [phone]

## 2024-06-19 NOTE — TELEPHONE ENCOUNTER
Pt called to confirm that she received the message through her MyChart.   Pt thanked us for our help.

## 2024-06-20 ENCOUNTER — TELEPHONE (OUTPATIENT)
Age: 39
End: 2024-06-20

## 2024-06-20 NOTE — TELEPHONE ENCOUNTER
Patient called, since Wegovy is not covered by her insurance and is too expensive to pay out of pocket, the patient is kindly requesting the provider send a script to the pharmacy on file for Mounjaro 7.5.  She has a coupon Mounjaro is more affordable.  Kindly advise once Mounjaro is sent to the pharmacy.

## 2024-06-21 ENCOUNTER — HOSPITAL ENCOUNTER (OUTPATIENT)
Dept: INFUSION CENTER | Facility: CLINIC | Age: 39
End: 2024-06-21
Payer: COMMERCIAL

## 2024-06-21 VITALS
SYSTOLIC BLOOD PRESSURE: 148 MMHG | HEART RATE: 82 BPM | TEMPERATURE: 97.2 F | DIASTOLIC BLOOD PRESSURE: 90 MMHG | RESPIRATION RATE: 18 BRPM

## 2024-06-21 DIAGNOSIS — D50.9 IRON DEFICIENCY ANEMIA, UNSPECIFIED IRON DEFICIENCY ANEMIA TYPE: Primary | ICD-10-CM

## 2024-06-21 PROCEDURE — 96365 THER/PROPH/DIAG IV INF INIT: CPT

## 2024-06-21 RX ORDER — SODIUM CHLORIDE 9 MG/ML
20 INJECTION, SOLUTION INTRAVENOUS ONCE
Status: COMPLETED | OUTPATIENT
Start: 2024-06-21 | End: 2024-06-21

## 2024-06-21 RX ORDER — SODIUM CHLORIDE 9 MG/ML
20 INJECTION, SOLUTION INTRAVENOUS ONCE
Status: CANCELLED | OUTPATIENT
Start: 2024-06-28

## 2024-06-21 RX ADMIN — IRON SUCROSE 100 MG: 20 INJECTION, SOLUTION INTRAVENOUS at 11:37

## 2024-06-21 RX ADMIN — SODIUM CHLORIDE 20 ML/HR: 9 INJECTION, SOLUTION INTRAVENOUS at 11:21

## 2024-06-28 ENCOUNTER — HOSPITAL ENCOUNTER (OUTPATIENT)
Dept: INFUSION CENTER | Facility: CLINIC | Age: 39
End: 2024-06-28
Payer: COMMERCIAL

## 2024-06-28 VITALS
SYSTOLIC BLOOD PRESSURE: 107 MMHG | HEART RATE: 79 BPM | RESPIRATION RATE: 16 BRPM | DIASTOLIC BLOOD PRESSURE: 75 MMHG | TEMPERATURE: 97.9 F

## 2024-06-28 DIAGNOSIS — D50.9 IRON DEFICIENCY ANEMIA, UNSPECIFIED IRON DEFICIENCY ANEMIA TYPE: Primary | ICD-10-CM

## 2024-06-28 PROCEDURE — 96365 THER/PROPH/DIAG IV INF INIT: CPT

## 2024-06-28 RX ORDER — SODIUM CHLORIDE 9 MG/ML
20 INJECTION, SOLUTION INTRAVENOUS ONCE
Status: COMPLETED | OUTPATIENT
Start: 2024-06-28 | End: 2024-06-28

## 2024-06-28 RX ORDER — SODIUM CHLORIDE 9 MG/ML
20 INJECTION, SOLUTION INTRAVENOUS ONCE
Status: CANCELLED | OUTPATIENT
Start: 2024-07-05

## 2024-06-28 RX ADMIN — SODIUM CHLORIDE 20 ML/HR: 0.9 INJECTION, SOLUTION INTRAVENOUS at 11:55

## 2024-06-28 RX ADMIN — IRON SUCROSE 100 MG: 20 INJECTION, SOLUTION INTRAVENOUS at 11:55

## 2024-06-28 NOTE — PROGRESS NOTES
Pt presents for venofer. No new meds or concerns. Pt tolerated treatment without adverse reaction. Future appointments discussed, confirmed with patient for 7/5/2024 1000. AVS declined.

## 2024-07-05 ENCOUNTER — HOSPITAL ENCOUNTER (OUTPATIENT)
Dept: INFUSION CENTER | Facility: CLINIC | Age: 39
End: 2024-07-05
Payer: COMMERCIAL

## 2024-07-05 VITALS
HEART RATE: 80 BPM | TEMPERATURE: 97.5 F | RESPIRATION RATE: 16 BRPM | SYSTOLIC BLOOD PRESSURE: 129 MMHG | DIASTOLIC BLOOD PRESSURE: 84 MMHG

## 2024-07-05 DIAGNOSIS — D50.9 IRON DEFICIENCY ANEMIA, UNSPECIFIED IRON DEFICIENCY ANEMIA TYPE: Primary | ICD-10-CM

## 2024-07-05 PROCEDURE — 96365 THER/PROPH/DIAG IV INF INIT: CPT

## 2024-07-05 RX ORDER — SODIUM CHLORIDE 9 MG/ML
20 INJECTION, SOLUTION INTRAVENOUS ONCE
OUTPATIENT
Start: 2024-07-12

## 2024-07-05 RX ORDER — SODIUM CHLORIDE 9 MG/ML
20 INJECTION, SOLUTION INTRAVENOUS ONCE
Status: COMPLETED | OUTPATIENT
Start: 2024-07-05 | End: 2024-07-05

## 2024-07-05 RX ADMIN — IRON SUCROSE 100 MG: 20 INJECTION, SOLUTION INTRAVENOUS at 10:10

## 2024-07-05 RX ADMIN — SODIUM CHLORIDE 20 ML/HR: 0.9 INJECTION, SOLUTION INTRAVENOUS at 10:00

## 2024-07-05 NOTE — PROGRESS NOTES
Patient tolerated venofer infusion without incident. Patient declined AVS but she is aware of next appointment on 7/12/24 at 10:30AM.

## 2024-07-11 NOTE — TELEPHONE ENCOUNTER
Medication: tirzepatide    Dose/Frequency: 7.5mg- inject 0.5mL/7.5mg under the skin every 7 days    Quantity: 2mL    Pharmacy: [facility] - [place] - [id number] [facility] [address] [facility]      Office:   [x] PCP/Provider - Yamila Galvin  [] Speciality/Provider -     Does the patient have enough for 3 days?   [x] Yes   [] No - Send as HP to POD

## 2024-07-12 ENCOUNTER — HOSPITAL ENCOUNTER (OUTPATIENT)
Dept: INFUSION CENTER | Facility: CLINIC | Age: 39
End: 2024-07-12
Payer: COMMERCIAL

## 2024-07-12 VITALS
RESPIRATION RATE: 18 BRPM | SYSTOLIC BLOOD PRESSURE: 119 MMHG | HEART RATE: 88 BPM | TEMPERATURE: 98.5 F | DIASTOLIC BLOOD PRESSURE: 78 MMHG

## 2024-07-12 DIAGNOSIS — D50.9 IRON DEFICIENCY ANEMIA, UNSPECIFIED IRON DEFICIENCY ANEMIA TYPE: Primary | ICD-10-CM

## 2024-07-12 PROCEDURE — 96365 THER/PROPH/DIAG IV INF INIT: CPT

## 2024-07-12 RX ORDER — SODIUM CHLORIDE 9 MG/ML
20 INJECTION, SOLUTION INTRAVENOUS ONCE
Status: COMPLETED | OUTPATIENT
Start: 2024-07-12 | End: 2024-07-12

## 2024-07-12 RX ORDER — SODIUM CHLORIDE 9 MG/ML
20 INJECTION, SOLUTION INTRAVENOUS ONCE
Status: CANCELLED | OUTPATIENT
Start: 2024-07-12

## 2024-07-12 RX ADMIN — SODIUM CHLORIDE 20 ML/HR: 0.9 INJECTION, SOLUTION INTRAVENOUS at 10:33

## 2024-07-12 RX ADMIN — IRON SUCROSE 100 MG: 20 INJECTION, SOLUTION INTRAVENOUS at 10:34

## 2024-07-12 NOTE — PLAN OF CARE
Problem: INFECTION - ADULT  Goal: Absence or prevention of progression during hospitalization  Description: INTERVENTIONS:  - Assess and monitor for signs and symptoms of infection  - Monitor lab/diagnostic results  - Monitor all insertion sites, i.e. indwelling lines, tubes, and drains  - Monitor endotracheal if appropriate and nasal secretions for changes in amount and color  - [place] appropriate cooling/warming therapies per order  - Administer medications as ordered  - Instruct and encourage patient and family to use good hand hygiene technique  - Identify and instruct in appropriate isolation precautions for identified infection/condition  Outcome: Progressing

## 2024-07-12 NOTE — PROGRESS NOTES
Patient arrived to the unit and denied complications with previous infusions. Tolerated venofer infusion well without adverse affects. Aware of follow up appointment.

## 2024-07-16 ENCOUNTER — OFFICE VISIT (OUTPATIENT)
Dept: OBGYN CLINIC | Facility: MEDICAL CENTER | Age: 39
End: 2024-07-16
Payer: COMMERCIAL

## 2024-07-16 VITALS
BODY MASS INDEX: 25.76 KG/M2 | SYSTOLIC BLOOD PRESSURE: 110 MMHG | WEIGHT: 145.4 LBS | DIASTOLIC BLOOD PRESSURE: 74 MMHG | HEIGHT: 63 IN

## 2024-07-16 DIAGNOSIS — N93.9 ABNORMAL UTERINE BLEEDING (AUB): ICD-10-CM

## 2024-07-16 DIAGNOSIS — D21.9 FIBROID: ICD-10-CM

## 2024-07-16 DIAGNOSIS — Z12.31 ENCOUNTER FOR SCREENING MAMMOGRAM FOR BREAST CANCER: ICD-10-CM

## 2024-07-16 DIAGNOSIS — Z01.419 ENCOUNTER FOR WELL WOMAN EXAM WITH ROUTINE GYNECOLOGICAL EXAM: Primary | ICD-10-CM

## 2024-07-16 DIAGNOSIS — Z12.4 PAP SMEAR FOR CERVICAL CANCER SCREENING: ICD-10-CM

## 2024-07-16 PROCEDURE — S0612 ANNUAL GYNECOLOGICAL EXAMINA: HCPCS | Performed by: OBSTETRICS & GYNECOLOGY

## 2024-07-16 RX ORDER — DROSPIRENONE AND ETHINYL ESTRADIOL 0.02-3(28)
1 KIT ORAL DAILY
Qty: 90 TABLET | Refills: 1 | Status: SHIPPED | OUTPATIENT
Start: 2024-07-16

## 2024-07-16 NOTE — PROGRESS NOTES
[facility] [address], [place], [place]    ASSESSMENT/PLAN: Laly Mclean is a 38 y.o.  who presents for annual gynecologic exam.    Encounter for routine gynecologic examination  - Routine well woman exam completed today.  - Cervical Cancer Screening: Current ASCCP Guidelines reviewed. Last Pap: 2018 . Next Pap Due: today, but cycle started - will follow up for pap   - Contraceptive counseling discussed.  Current contraception: combination OCPs sent today     Additional problems addressed during this visit:  1. Encounter for well woman exam with routine gynecological exam  2. Encounter for screening mammogram for breast cancer  -     Mammo screening bilateral w 3d & cad; Future; Expected date: 2024  3. Pap smear for cervical cancer screening  4. Fibroid  -     US pelvis complete w transvaginal; Future  5. Abnormal uterine bleeding (AUB)  -     US pelvis complete w transvaginal; Future  -     drospirenone-ethinyl estradiol (TACOS) 3-0.02 MG per tablet; Take 1 tablet by mouth daily      CC:  Annual Gynecologic Examination    HPI: Laly Mclean is a 38 y.o.  who presents for annual gynecologic examination.  HPI  Patient presents for annual exam. States her cycles are heavy. She is working with her PCP to increase her iron. Does have a history of uterine fibroids.   Interested in birth control as well.   Will have patient return in 3 months for pap and pill check. She took birth control after her myomectomy and did well    The following portions of the patient's history were reviewed and updated as appropriate: allergies, current medications, past family history, past medical history, obstetric history, gynecologic history, past social history, past surgical history and problem list.    Review of Systems   Constitutional: Negative.    HENT: Negative.     Eyes: Negative.    Respiratory: Negative.     Cardiovascular: Negative.    Gastrointestinal:  "Negative.    Genitourinary:  Positive for menstrual problem.   Musculoskeletal: Negative.    All other systems reviewed and are negative.        Objective:  /74   Ht 5' 3\" (1.6 m)   Wt 66 kg (145 lb 6.4 oz)   LMP 07/15/2024   BMI 25.76 kg/m²    Physical Exam  Vitals reviewed.   Constitutional:       General: She is not in acute distress.     Appearance: She is well-developed.   HENT:      Head: Normocephalic and atraumatic.      Nose: Nose normal.   Cardiovascular:      Rate and Rhythm: Normal rate.   Pulmonary:      Effort: Pulmonary effort is normal. No respiratory distress.   Chest:   Breasts:     Breasts are symmetrical.      Right: Normal. No mass, nipple discharge, skin change or tenderness.      Left: Normal. No mass, nipple discharge, skin change or tenderness.   Abdominal:      General: There is no distension.      Palpations: Abdomen is soft. There is no mass.      Tenderness: There is no abdominal tenderness. There is no guarding or rebound.   Genitourinary:     General: Normal vulva.      Exam position: Lithotomy position.      Labia:         Right: No lesion.         Left: No lesion.       Urethra: No prolapse (urethral meatus normal).      Vagina: Normal. No vaginal discharge, erythema or bleeding.      Cervix: Normal.      Uterus: Normal.       Adnexa: Right adnexa normal and left adnexa normal.   Musculoskeletal:         General: Normal range of motion.      Cervical back: Normal range of motion.   Lymphadenopathy:      Upper Body:      Right upper body: No supraclavicular, axillary or pectoral adenopathy.      Left upper body: No supraclavicular, axillary or pectoral adenopathy.      Lower Body: No right inguinal adenopathy. No left inguinal adenopathy.   Skin:     General: Skin is warm and dry.   Neurological:      Mental Status: She is alert and oriented to person, place, and time.   Psychiatric:         Behavior: Behavior normal.         Thought Content: Thought content normal.         " Judgment: Judgment normal.

## 2024-07-18 NOTE — TELEPHONE ENCOUNTER
Patient called and stated she pays out of pocket for Mounjaro and is not sure why it is being sent to insurance for prior authorization. Patient would like this medication sent in as soon as possible. Please advise.

## 2024-07-23 RX ORDER — TIRZEPATIDE 7.5 MG/.5ML
INJECTION, SOLUTION SUBCUTANEOUS
Qty: 2 ML | Refills: 0 | OUTPATIENT
Start: 2024-07-23

## 2024-07-24 ENCOUNTER — TELEPHONE (OUTPATIENT)
Age: 39
End: 2024-07-24

## 2024-07-24 NOTE — TELEPHONE ENCOUNTER
Patient is calling to see why she keeps getting conflicting messages about the refill for Mounjaro.  She explained that she pays for this out of pocket, it does not get submitted to insurance or need a prior authorization.  She reports she initially called to request the refill on 7/11 and has followed up several times since.  She is now out of it and she is due to take it.    Asking if we could please send it to the confirmed pharmacy ([facility]) and advise she is paying out of pocket.  She would like to know how to avoid this in the future.  Please advise.

## 2024-07-31 ENCOUNTER — HOSPITAL ENCOUNTER (OUTPATIENT)
Dept: ULTRASOUND IMAGING | Facility: MEDICAL CENTER | Age: 39
Discharge: HOME | End: 2024-07-31
Payer: COMMERCIAL

## 2024-07-31 DIAGNOSIS — D21.9 FIBROID: ICD-10-CM

## 2024-07-31 DIAGNOSIS — N93.9 ABNORMAL UTERINE BLEEDING (AUB): ICD-10-CM

## 2024-07-31 PROCEDURE — 76830 TRANSVAGINAL US NON-OB: CPT

## 2024-07-31 PROCEDURE — 76856 US EXAM PELVIC COMPLETE: CPT

## 2024-08-19 DIAGNOSIS — N93.9 ABNORMAL UTERINE BLEEDING (AUB): ICD-10-CM

## 2024-08-19 RX ORDER — DROSPIRENONE AND ETHINYL ESTRADIOL 0.02-3(28)
1 KIT ORAL DAILY
Qty: 90 TABLET | Refills: 1 | Status: SHIPPED | OUTPATIENT
Start: 2024-08-19

## 2024-08-30 ENCOUNTER — OFFICE VISIT (OUTPATIENT)
Dept: FAMILY MEDICINE CLINIC | Facility: CLINIC | Age: 39
End: 2024-08-30
Payer: COMMERCIAL

## 2024-08-30 ENCOUNTER — APPOINTMENT (OUTPATIENT)
Dept: LAB | Facility: MEDICAL CENTER | Age: 39
End: 2024-08-30
Payer: COMMERCIAL

## 2024-08-30 VITALS
OXYGEN SATURATION: 99 % | HEIGHT: 63 IN | DIASTOLIC BLOOD PRESSURE: 70 MMHG | SYSTOLIC BLOOD PRESSURE: 108 MMHG | WEIGHT: 138 LBS | TEMPERATURE: 98.3 F | BODY MASS INDEX: 24.45 KG/M2 | HEART RATE: 82 BPM

## 2024-08-30 DIAGNOSIS — D50.0 IRON DEFICIENCY ANEMIA DUE TO CHRONIC BLOOD LOSS: Primary | ICD-10-CM

## 2024-08-30 LAB
BASOPHILS # BLD AUTO: 0.02 THOUSANDS/ÂΜL (ref 0–0.1)
BASOPHILS NFR BLD AUTO: 0 % (ref 0–1)
EOSINOPHIL # BLD AUTO: 0.04 THOUSAND/ÂΜL (ref 0–0.61)
EOSINOPHIL NFR BLD AUTO: 1 % (ref 0–6)
ERYTHROCYTE [DISTWIDTH] IN BLOOD BY AUTOMATED COUNT: 13 % (ref 11.6–15.1)
FERRITIN SERPL-MCNC: 99 NG/ML (ref 11–307)
HCT VFR BLD AUTO: 35.7 % (ref 34.8–46.1)
HGB BLD-MCNC: 12 G/DL (ref 11.5–15.4)
IMM GRANULOCYTES # BLD AUTO: 0.02 THOUSAND/UL (ref 0–0.2)
IMM GRANULOCYTES NFR BLD AUTO: 0 % (ref 0–2)
IRON SATN MFR SERPL: 16 % (ref 15–50)
IRON SERPL-MCNC: 54 UG/DL (ref 50–212)
LYMPHOCYTES # BLD AUTO: 2.31 THOUSANDS/ÂΜL (ref 0.6–4.47)
LYMPHOCYTES NFR BLD AUTO: 38 % (ref 14–44)
MCH RBC QN AUTO: 29.6 PG (ref 26.8–34.3)
MCHC RBC AUTO-ENTMCNC: 33.6 G/DL (ref 31.4–37.4)
MCV RBC AUTO: 88 FL (ref 82–98)
MONOCYTES # BLD AUTO: 0.38 THOUSAND/ÂΜL (ref 0.17–1.22)
MONOCYTES NFR BLD AUTO: 6 % (ref 4–12)
NEUTROPHILS # BLD AUTO: 3.36 THOUSANDS/ÂΜL (ref 1.85–7.62)
NEUTS SEG NFR BLD AUTO: 55 % (ref 43–75)
NRBC BLD AUTO-RTO: 0 /100 WBCS
PLATELET # BLD AUTO: 265 THOUSANDS/UL (ref 149–390)
PMV BLD AUTO: 10.9 FL (ref 8.9–12.7)
RBC # BLD AUTO: 4.06 MILLION/UL (ref 3.81–5.12)
TIBC SERPL-MCNC: 332 UG/DL (ref 250–450)
UIBC SERPL-MCNC: 278 UG/DL (ref 155–355)
WBC # BLD AUTO: 6.13 THOUSAND/UL (ref 4.31–10.16)

## 2024-08-30 PROCEDURE — 99213 OFFICE O/P EST LOW 20 MIN: CPT | Performed by: INTERNAL MEDICINE

## 2024-08-30 NOTE — PROGRESS NOTES
"Assessment/Plan:           Problem List Items Addressed This Visit       Anemia - Primary    Relevant Orders    CBC and differential    Iron Panel (Includes Ferritin, Iron Sat%, Iron, and TIBC)         Subjective:      Patient ID: Laly Mclean is a 38 y.o. female.    HPI  Patient is here to follow-up on iron deficiency anemia.  She has received iron transfusions and is due for follow-up blood work.  She feels great since she had infusion.  Blood work will be ordered today.  EGD colonoscopy send hold till after reviewing the lab studies.  She is also noted to have leiomyoma on her recent pelvic ultrasound.  Her gynecologist placed her on birth control which has helped appears tremendously.  She will be following up with her for further evaluation.   She is tolerating Mounjaro quite well and has lost 20 pounds since June.  She is quite happy.  Unfortunately her insurance would not cover and she is being out of pocket but is quite content with this arrangement.  The following portions of the patient's history were reviewed and updated as appropriate: allergies, current medications, past medical history, past social history, past surgical history, and problem list.    Review of Systems      Objective:      /70 (BP Location: Right arm, Patient Position: Sitting, Cuff Size: Standard)   Pulse 82   Temp 98.3 °F (36.8 °C) (Temporal)   Ht 5' 3\" (1.6 m)   Wt 62.6 kg (138 lb)   SpO2 99%   BMI 24.45 kg/m²          Physical Exam  Abdominal:      General: There is no distension.      Tenderness: There is no abdominal tenderness. There is no guarding.   Neurological:      Mental Status: She is alert.                 "

## 2024-11-21 ENCOUNTER — TELEPHONE (OUTPATIENT)
Age: 39
End: 2024-11-21

## 2024-11-21 NOTE — TELEPHONE ENCOUNTER
Patient called the RX Refill Line. Message is being forwarded to the office.     Patient is requesting a refill for Mounjaro 7.5 mL. It will not let me que it up without filling in this box.

## 2024-12-03 ENCOUNTER — HOSPITAL ENCOUNTER (OUTPATIENT)
Dept: MAMMOGRAPHY | Facility: MEDICAL CENTER | Age: 39
Discharge: HOME | End: 2024-12-03
Payer: COMMERCIAL

## 2024-12-03 VITALS — HEIGHT: 63 IN | BODY MASS INDEX: 24.45 KG/M2 | WEIGHT: 138.01 LBS

## 2024-12-03 DIAGNOSIS — Z12.31 ENCOUNTER FOR SCREENING MAMMOGRAM FOR BREAST CANCER: ICD-10-CM

## 2024-12-03 PROCEDURE — 77063 BREAST TOMOSYNTHESIS BI: CPT

## 2024-12-03 PROCEDURE — 77067 SCR MAMMO BI INCL CAD: CPT

## 2024-12-10 ENCOUNTER — RESULTS FOLLOW-UP (OUTPATIENT)
Dept: OBGYN CLINIC | Facility: MEDICAL CENTER | Age: 39
End: 2024-12-10

## 2024-12-17 ENCOUNTER — OFFICE VISIT (OUTPATIENT)
Dept: OBGYN CLINIC | Facility: MEDICAL CENTER | Age: 39
End: 2024-12-17
Payer: COMMERCIAL

## 2024-12-17 VITALS — BODY MASS INDEX: 24.2 KG/M2 | DIASTOLIC BLOOD PRESSURE: 70 MMHG | WEIGHT: 136.6 LBS | SYSTOLIC BLOOD PRESSURE: 100 MMHG

## 2024-12-17 DIAGNOSIS — Z12.4 PAP SMEAR FOR CERVICAL CANCER SCREENING: Primary | ICD-10-CM

## 2024-12-17 DIAGNOSIS — Z12.4 PAPANICOLAOU SMEAR: ICD-10-CM

## 2024-12-17 PROBLEM — O34.29 PREGNANCY WITH HISTORY OF UTERINE MYOMECTOMY: Status: RESOLVED | Noted: 2019-12-19 | Resolved: 2024-12-17

## 2024-12-17 PROCEDURE — G0145 SCR C/V CYTO,THINLAYER,RESCR: HCPCS | Performed by: OBSTETRICS & GYNECOLOGY

## 2024-12-17 PROCEDURE — G0476 HPV COMBO ASSAY CA SCREEN: HCPCS | Performed by: OBSTETRICS & GYNECOLOGY

## 2024-12-17 PROCEDURE — 99213 OFFICE O/P EST LOW 20 MIN: CPT | Performed by: OBSTETRICS & GYNECOLOGY

## 2024-12-17 NOTE — PROGRESS NOTES
Name: Laly Mclean      : 1985      MRN: [id number]  Encounter Provider: Viviana Sam MD  Encounter Date: 2024   Encounter department: [facility]  :  Assessment & Plan  Pap smear for cervical cancer screening    Orders:    Liquid-based pap, screening    Papanicolaou smear             History of Present Illness   HPI  Laly Mclean is a 39 y.o. female who presents for pap smear. At her recent annual, she was on her cycle at the time of her annual exam, therefore pap was not done.   Patient is taking OCPs for birth control. She is using Monjauro for weight loss, doing well; she does spot with her OCPs - reports she does occasionally miss her pill  History obtained from: patient    Review of Systems   Constitutional: Negative.    HENT: Negative.     Eyes: Negative.    Respiratory: Negative.     Cardiovascular: Negative.    Gastrointestinal: Negative.    Genitourinary: Negative.    Musculoskeletal: Negative.    All other systems reviewed and are negative.    Current Outpatient Medications on File Prior to Visit   Medication Sig Dispense Refill    ascorbic acid (VITAMIN C) 250 mg tablet Take vitamin C with iron every other day 60 tablet 0    Cholecalciferol (Vitamin D) 50 MCG (2000 UT) CAPS 1 tablet a day 30 capsule 3    drospirenone-ethinyl estradiol (TACOS) 3-0.02 MG per tablet Take 1 tablet by mouth daily 90 tablet 1    ferrous sulfate 324 (65 Fe) mg 1 pill every other day with vitamin C 60 tablet 0    ibuprofen (MOTRIN) 600 mg tablet Take 1 tablet (600 mg total) by mouth every 6 (six) hours as needed for moderate pain 50 tablet 1    Tirzepatide 7.5 MG/0.5ML SOAJ Inject 0.5 mL under the skin every 7 days 2 mL 3     No current facility-administered medications on file prior to visit.      Social History     Tobacco Use    Smoking status: Never    Smokeless tobacco: Never   Vaping Use    Vaping status: Never Used   Substance and Sexual Activity    Alcohol use:  Not Currently    Drug use: Never    Sexual activity: Not Currently     Partners: Male     Birth control/protection: None        Objective   /70   Wt 62 kg (136 lb 9.6 oz)   LMP 12/03/2024   BMI 24.20 kg/m²      Physical Exam  Vitals and nursing note reviewed.   Constitutional:       General: She is not in acute distress.     Appearance: She is well-developed.   HENT:      Head: Normocephalic and atraumatic.   Eyes:      Conjunctiva/sclera: Conjunctivae normal.   Cardiovascular:      Rate and Rhythm: Normal rate and regular rhythm.      Heart sounds: No murmur heard.  Pulmonary:      Effort: Pulmonary effort is normal. No respiratory distress.      Breath sounds: Normal breath sounds.   Abdominal:      Palpations: Abdomen is soft.      Tenderness: There is no abdominal tenderness.   Genitourinary:     General: Normal vulva.      Exam position: Lithotomy position.      Labia:         Right: No rash, tenderness or lesion.         Left: No rash, tenderness or lesion.    Musculoskeletal:         General: No swelling.      Cervical back: Neck supple.   Skin:     General: Skin is warm and dry.      Capillary Refill: Capillary refill takes less than 2 seconds.   Neurological:      Mental Status: She is alert.   Psychiatric:         Mood and Affect: Mood normal.

## 2024-12-18 LAB
HPV HR 12 DNA CVX QL NAA+PROBE: NEGATIVE
HPV16 DNA CVX QL NAA+PROBE: NEGATIVE
HPV18 DNA CVX QL NAA+PROBE: NEGATIVE

## 2024-12-23 LAB
LAB AP GYN PRIMARY INTERPRETATION: NORMAL
Lab: NORMAL
PATH INTERP SPEC-IMP: NORMAL

## 2024-12-31 ENCOUNTER — RESULTS FOLLOW-UP (OUTPATIENT)
Dept: OBGYN CLINIC | Facility: MEDICAL CENTER | Age: 39
End: 2024-12-31

## 2025-01-29 DIAGNOSIS — N93.9 ABNORMAL UTERINE BLEEDING (AUB): ICD-10-CM

## 2025-01-30 RX ORDER — DROSPIRENONE AND ETHINYL ESTRADIOL 0.02-3(28)
1 KIT ORAL DAILY
Qty: 84 TABLET | Refills: 4 | Status: SHIPPED | OUTPATIENT
Start: 2025-01-30

## 2025-02-18 RX ORDER — TIRZEPATIDE 7.5 MG/.5ML
INJECTION, SOLUTION SUBCUTANEOUS
Qty: 2 ML | Refills: 0 | Status: SHIPPED | OUTPATIENT
Start: 2025-02-18

## 2025-04-03 DIAGNOSIS — N93.9 ABNORMAL UTERINE BLEEDING (AUB): ICD-10-CM

## 2025-04-03 RX ORDER — DROSPIRENONE AND ETHINYL ESTRADIOL 0.02-3(28)
1 KIT ORAL DAILY
Qty: 84 TABLET | Refills: 4 | OUTPATIENT
Start: 2025-04-03

## 2025-06-30 ENCOUNTER — OFFICE VISIT (OUTPATIENT)
Age: 40
End: 2025-06-30
Payer: COMMERCIAL

## 2025-06-30 VITALS
WEIGHT: 140 LBS | HEIGHT: 63 IN | SYSTOLIC BLOOD PRESSURE: 96 MMHG | OXYGEN SATURATION: 98 % | DIASTOLIC BLOOD PRESSURE: 60 MMHG | HEART RATE: 60 BPM | TEMPERATURE: 98.3 F | BODY MASS INDEX: 24.8 KG/M2

## 2025-06-30 DIAGNOSIS — Z76.89 ENCOUNTER FOR WEIGHT MANAGEMENT: ICD-10-CM

## 2025-06-30 DIAGNOSIS — Z13.220 LIPID SCREENING: ICD-10-CM

## 2025-06-30 DIAGNOSIS — Z13.1 SCREENING FOR DIABETES MELLITUS (DM): ICD-10-CM

## 2025-06-30 DIAGNOSIS — D50.9 IRON DEFICIENCY ANEMIA, UNSPECIFIED IRON DEFICIENCY ANEMIA TYPE: ICD-10-CM

## 2025-06-30 DIAGNOSIS — Z13.29 THYROID DISORDER SCREENING: ICD-10-CM

## 2025-06-30 DIAGNOSIS — Z00.00 ANNUAL PHYSICAL EXAM: Primary | ICD-10-CM

## 2025-06-30 PROCEDURE — 99396 PREV VISIT EST AGE 40-64: CPT | Performed by: INTERNAL MEDICINE

## 2025-06-30 NOTE — PATIENT INSTRUCTIONS
"Patient Education     Routine physical for adults   The Basics   Written by the doctors and editors at [facility]   What is a physical? -- A physical is a routine visit, or \"check-up,\" with your doctor. You might also hear it called a \"wellness visit\" or \"preventive visit.\"  During each visit, the doctor will:   Ask about your physical and mental health   Ask about your habits, behaviors, and lifestyle   Do an exam   Give you vaccines if needed   Talk to you about any medicines you take   Give advice about your health   Answer your questions  Getting regular check-ups is an important part of taking care of your health. It can help your doctor find and treat any problems you have. But it's also important for preventing health problems.  A routine physical is different from a \"sick visit.\" A sick visit is when you see a doctor because of a health concern or problem. Since physicals are scheduled ahead of time, you can think about what you want to ask the doctor.  How often should I get a physical? -- It depends on your age and health. In general, for people age 21 years and older:   If you are younger than 50 years, you might be able to get a physical every 3 years.   If you are 50 years or older, your doctor might recommend a physical every year.  If you have an ongoing health condition, like diabetes or high blood pressure, your doctor will probably want to see you more often.  What happens during a physical? -- In general, each visit will include:   Physical exam - The doctor or nurse will check your height, weight, heart rate, and blood pressure. They will also look at your eyes and ears. They will ask about how you are feeling and whether you have any symptoms that bother you.   Medicines - It's a good idea to bring a list of all the medicines you take to each doctor visit. Your doctor will talk to you about your medicines and answer any questions. Tell them if you are having any side effects that bother you. You " "should also tell them if you are having trouble paying for any of your medicines.   Habits and behaviors - This includes:   Your diet   Your exercise habits   Whether you smoke, drink alcohol, or use drugs   Whether you are sexually active   Whether you feel safe at home  Your doctor will talk to you about things you can do to improve your health and lower your risk of health problems. They will also offer help and support. For example, if you want to quit smoking, they can give you advice and might prescribe medicines. If you want to improve your diet or get more physical activity, they can help you with this, too.   Lab tests, if needed - The tests you get will depend on your age and situation. For example, your doctor might want to check your:   Cholesterol   Blood sugar   Iron level   Vaccines - The recommended vaccines will depend on your age, health, and what vaccines you already had. Vaccines are very important because they can prevent certain serious or deadly infections.   Discussion of screening - \"Screening\" means checking for diseases or other health problems before they cause symptoms. Your doctor can recommend screening based on your age, risk, and preferences. This might include tests to check for:   Cancer, such as breast, prostate, cervical, ovarian, colorectal, prostate, lung, or skin cancer   Sexually transmitted infections, such as chlamydia and gonorrhea   Mental health conditions like depression and anxiety  Your doctor will talk to you about the different types of screening tests. They can help you decide which screenings to have. They can also explain what the results might mean.   Answering questions - The physical is a good time to ask the doctor or nurse questions about your health. If needed, they can refer you to other doctors or specialists, too.  Adults older than 65 years often need other care, too. As you get older, your doctor will talk to you about:   How to prevent falling at " home   Hearing or vision tests   Memory testing   How to take your medicines safely   Making sure that you have the help and support you need at home  All topics are updated as new evidence becomes available and our peer review process is complete.  This topic retrieved from [employer] on: May 02, 2024.  Topic 146411 Version 1.0  Release: 32.4.3 - C32.122  © 2024 UpToDate, Inc. and/or its affiliates. All rights reserved.  Consumer Information Use and Disclaimer   Disclaimer: This generalized information is a limited summary of diagnosis, treatment, and/or medication information. It is not meant to be comprehensive and should be used as a tool to help the user understand and/or assess potential diagnostic and treatment options. It does NOT include all information about conditions, treatments, medications, side effects, or risks that may apply to a specific patient. It is not intended to be medical advice or a substitute for the medical advice, diagnosis, or treatment of a health care provider based on the health care provider's examination and assessment of a patient's specific and unique circumstances. Patients must speak with a health care provider for complete information about their health, medical questions, and treatment options, including any risks or benefits regarding use of medications. This information does not endorse any treatments or medications as safe, effective, or approved for treating a specific patient. UpToDate, Inc. and its affiliates disclaim any warranty or liability relating to this information or the use thereof.The use of this information is governed by the Terms of Use, available at https://www.woltersCoScheduleuwer.com/en/know/clinical-effectiveness-terms. 2024© UpToDate, Inc. and its affiliates and/or licensors. All rights reserved.  Copyright   © 2024 UpToDate, Inc. and/or its affiliates. All rights reserved.

## 2025-06-30 NOTE — ASSESSMENT & PLAN NOTE
Orders:    Iron Panel (Includes Ferritin, Iron Sat%, Iron, and TIBC)    CBC and differential

## 2025-06-30 NOTE — PROGRESS NOTES
Adult Annual Physical  Name: Laly Mclean      : 1985      MRN: [id number]  Encounter Provider: Yamila Galvin MD  Encounter Date: 2025   Encounter department: [facility]    :  Assessment & Plan  Annual physical exam         Iron deficiency anemia, unspecified iron deficiency anemia type        Orders:    Iron Panel (Includes Ferritin, Iron Sat%, Iron, and TIBC)    CBC and differential    Thyroid disorder screening    Orders:    TSH, 3rd generation with Free T4 reflex    Lipid screening    Orders:    Lipid panel    Screening for diabetes mellitus (DM)    Orders:    Comprehensive metabolic panel    Hemoglobin A1C; Future    Urinalysis with microscopic; Future    Encounter for weight management             Preventive Screenings:    - Hepatitis C screening: screening up-to-date   - HIV screening: screening up-to-date   - Cervical cancer screening: screening up-to-date   - Breast cancer screening: screening up-to-date   - Lung cancer screening: screening not indicated        Patient is here for annual physical.  She has lost significant amount of weight 151-140.  She is doing well on 7.5 mg of Ozempic.  She will look into any coupon online as she has been paying this out-of-pocket.  Lab studies are due.  Will check iron as well.  If iron is low EGD colonoscopy will be considered.  Mammogram up-to-date.    History of Present Illness     Adult Annual Physical:  Patient presents for annual physical.     Diet and Physical Activity:  - Diet/Nutrition: well balanced diet.  - Exercise: no formal exercise.    Depression Screening:  - PHQ-2 Score: 0    General Health:  - Sleep: sleeps well and 4-6 hours of sleep on average.  - Hearing: normal hearing right ear, normal hearing left ear and normal hearing bilateral ears.  - Vision: no vision problems.  - Dental: regular dental visits.    /GYN Health:  - Follows with GYN: yes.   - History of STDs: no    Review of Systems      Objective  "  BP 96/60 (BP Location: Left arm, Patient Position: Sitting, Cuff Size: Large)   Pulse 60   Temp 98.3 °F (36.8 °C) (Temporal)   Ht 5' 3\" (1.6 m)   Wt 63.5 kg (140 lb)   SpO2 98%   BMI 24.80 kg/m²     Physical Exam  Constitutional:       General: She is not in acute distress.     Appearance: She is well-developed.   HENT:      Head: Normocephalic.      Mouth/Throat:      Pharynx: No oropharyngeal exudate.     Eyes:      General: No scleral icterus.     Conjunctiva/sclera: Conjunctivae normal.      Pupils: Pupils are equal, round, and reactive to light.     Neck:      Thyroid: No thyromegaly.      Vascular: No carotid bruit.     Cardiovascular:      Rate and Rhythm: Normal rate and regular rhythm.      Heart sounds: Normal heart sounds. No murmur heard.  Pulmonary:      Effort: Pulmonary effort is normal. No respiratory distress.      Breath sounds: Normal breath sounds. No wheezing or rales.   Abdominal:      General: Bowel sounds are normal. There is no distension.      Palpations: Abdomen is soft.      Tenderness: There is no abdominal tenderness. There is no guarding or rebound.     Musculoskeletal:      Right lower leg: No edema.      Left lower leg: No edema.   Lymphadenopathy:      Cervical: No cervical adenopathy.     Skin:     General: Skin is warm.     Neurological:      Mental Status: She is alert and oriented to person, place, and time.      Cranial Nerves: No cranial nerve deficit.      Deep Tendon Reflexes: Reflexes are normal and symmetric.     Psychiatric:         Mood and Affect: Mood normal.         Behavior: Behavior normal.         Thought Content: Thought content normal.         Judgment: Judgment normal.         "

## 2025-07-12 ENCOUNTER — APPOINTMENT (OUTPATIENT)
Dept: LAB | Facility: HOSPITAL | Age: 40
End: 2025-07-12
Payer: COMMERCIAL

## 2025-07-12 DIAGNOSIS — Z13.1 SCREENING FOR DIABETES MELLITUS (DM): ICD-10-CM

## 2025-07-12 DIAGNOSIS — Z00.8 HEALTH EXAMINATION IN POPULATION SURVEY: ICD-10-CM

## 2025-07-12 LAB
ALBUMIN SERPL BCG-MCNC: 4.5 G/DL (ref 3.5–5)
ALP SERPL-CCNC: 41 U/L (ref 34–104)
ALT SERPL W P-5'-P-CCNC: 13 U/L (ref 7–52)
ANION GAP SERPL CALCULATED.3IONS-SCNC: 7 MMOL/L (ref 4–13)
AST SERPL W P-5'-P-CCNC: 21 U/L (ref 13–39)
BACTERIA UR QL AUTO: ABNORMAL /HPF
BASOPHILS # BLD AUTO: 0.02 THOUSANDS/ÂΜL (ref 0–0.1)
BASOPHILS NFR BLD AUTO: 0 % (ref 0–1)
BILIRUB SERPL-MCNC: 0.64 MG/DL (ref 0.2–1)
BILIRUB UR QL STRIP: NEGATIVE
BUN SERPL-MCNC: 11 MG/DL (ref 5–25)
CALCIUM SERPL-MCNC: 9.9 MG/DL (ref 8.4–10.2)
CAOX CRY URNS QL MICRO: ABNORMAL /HPF
CHLORIDE SERPL-SCNC: 103 MMOL/L (ref 96–108)
CHOLEST SERPL-MCNC: 222 MG/DL (ref ?–200)
CLARITY UR: ABNORMAL
CO2 SERPL-SCNC: 28 MMOL/L (ref 21–32)
COLOR UR: YELLOW
CREAT SERPL-MCNC: 0.79 MG/DL (ref 0.6–1.3)
EOSINOPHIL # BLD AUTO: 0.06 THOUSAND/ÂΜL (ref 0–0.61)
EOSINOPHIL NFR BLD AUTO: 1 % (ref 0–6)
ERYTHROCYTE [DISTWIDTH] IN BLOOD BY AUTOMATED COUNT: 12.6 % (ref 11.6–15.1)
EST. AVERAGE GLUCOSE BLD GHB EST-MCNC: 105 MG/DL
FERRITIN SERPL-MCNC: 13 NG/ML (ref 30–307)
GFR SERPL CREATININE-BSD FRML MDRD: 93 ML/MIN/1.73SQ M
GLUCOSE P FAST SERPL-MCNC: 82 MG/DL (ref 65–99)
GLUCOSE UR STRIP-MCNC: NEGATIVE MG/DL
HBA1C MFR BLD: 5.3 %
HCT VFR BLD AUTO: 34.4 % (ref 34.8–46.1)
HDLC SERPL-MCNC: 55 MG/DL
HGB BLD-MCNC: 11.5 G/DL (ref 11.5–15.4)
HGB UR QL STRIP.AUTO: NEGATIVE
IMM GRANULOCYTES # BLD AUTO: 0.01 THOUSAND/UL (ref 0–0.2)
IMM GRANULOCYTES NFR BLD AUTO: 0 % (ref 0–2)
IRON SATN MFR SERPL: 14 % (ref 15–50)
IRON SERPL-MCNC: 52 UG/DL (ref 50–212)
KETONES UR STRIP-MCNC: NEGATIVE MG/DL
LDLC SERPL CALC-MCNC: 157 MG/DL (ref 0–100)
LEUKOCYTE ESTERASE UR QL STRIP: NEGATIVE
LYMPHOCYTES # BLD AUTO: 3.02 THOUSANDS/ÂΜL (ref 0.6–4.47)
LYMPHOCYTES NFR BLD AUTO: 60 % (ref 14–44)
MCH RBC QN AUTO: 29 PG (ref 26.8–34.3)
MCHC RBC AUTO-ENTMCNC: 33.4 G/DL (ref 31.4–37.4)
MCV RBC AUTO: 87 FL (ref 82–98)
MONOCYTES # BLD AUTO: 0.45 THOUSAND/ÂΜL (ref 0.17–1.22)
MONOCYTES NFR BLD AUTO: 9 % (ref 4–12)
MUCOUS THREADS UR QL AUTO: ABNORMAL
NEUTROPHILS # BLD AUTO: 1.54 THOUSANDS/ÂΜL (ref 1.85–7.62)
NEUTS SEG NFR BLD AUTO: 30 % (ref 43–75)
NITRITE UR QL STRIP: NEGATIVE
NON-SQ EPI CELLS URNS QL MICRO: ABNORMAL /HPF
NONHDLC SERPL-MCNC: 167 MG/DL
NRBC BLD AUTO-RTO: 0 /100 WBCS
PH UR STRIP.AUTO: 6 [PH]
PLATELET # BLD AUTO: 344 THOUSANDS/UL (ref 149–390)
PMV BLD AUTO: 10 FL (ref 8.9–12.7)
POTASSIUM SERPL-SCNC: 3.4 MMOL/L (ref 3.5–5.3)
PROT SERPL-MCNC: 7.7 G/DL (ref 6.4–8.4)
PROT UR STRIP-MCNC: ABNORMAL MG/DL
RBC # BLD AUTO: 3.96 MILLION/UL (ref 3.81–5.12)
RBC #/AREA URNS AUTO: ABNORMAL /HPF
SODIUM SERPL-SCNC: 138 MMOL/L (ref 135–147)
SP GR UR STRIP.AUTO: 1.03 (ref 1–1.03)
TIBC SERPL-MCNC: 380.8 UG/DL (ref 250–450)
TRANSFERRIN SERPL-MCNC: 272 MG/DL (ref 203–362)
TRIGL SERPL-MCNC: 51 MG/DL (ref ?–150)
TSH SERPL DL<=0.05 MIU/L-ACNC: 1.08 UIU/ML (ref 0.45–4.5)
UIBC SERPL-MCNC: 329 UG/DL (ref 155–355)
UROBILINOGEN UR STRIP-ACNC: <2 MG/DL
WBC # BLD AUTO: 5.1 THOUSAND/UL (ref 4.31–10.16)
WBC #/AREA URNS AUTO: ABNORMAL /HPF

## 2025-07-12 PROCEDURE — 80061 LIPID PANEL: CPT | Performed by: INTERNAL MEDICINE

## 2025-07-12 PROCEDURE — 83540 ASSAY OF IRON: CPT | Performed by: INTERNAL MEDICINE

## 2025-07-12 PROCEDURE — 82728 ASSAY OF FERRITIN: CPT | Performed by: INTERNAL MEDICINE

## 2025-07-12 PROCEDURE — 36415 COLL VENOUS BLD VENIPUNCTURE: CPT | Performed by: INTERNAL MEDICINE

## 2025-07-12 PROCEDURE — 81001 URINALYSIS AUTO W/SCOPE: CPT

## 2025-07-12 PROCEDURE — 83550 IRON BINDING TEST: CPT | Performed by: INTERNAL MEDICINE

## 2025-07-12 PROCEDURE — 84443 ASSAY THYROID STIM HORMONE: CPT | Performed by: INTERNAL MEDICINE

## 2025-07-12 PROCEDURE — 83036 HEMOGLOBIN GLYCOSYLATED A1C: CPT

## 2025-07-12 PROCEDURE — 85025 COMPLETE CBC W/AUTO DIFF WBC: CPT | Performed by: INTERNAL MEDICINE

## 2025-07-12 PROCEDURE — 80053 COMPREHEN METABOLIC PANEL: CPT | Performed by: INTERNAL MEDICINE

## 2025-07-21 ENCOUNTER — TELEPHONE (OUTPATIENT)
Age: 40
End: 2025-07-21

## 2025-07-21 DIAGNOSIS — M25.50 ARTHRALGIA, UNSPECIFIED JOINT: ICD-10-CM

## 2025-07-21 DIAGNOSIS — E78.49 OTHER HYPERLIPIDEMIA: Primary | ICD-10-CM

## 2025-07-21 DIAGNOSIS — E78.49 OTHER HYPERLIPIDEMIA: ICD-10-CM

## 2025-07-21 DIAGNOSIS — R73.03 PREDIABETES: ICD-10-CM

## 2025-07-21 RX ORDER — TIRZEPATIDE 7.5 MG/.5ML
7.5 INJECTION, SOLUTION SUBCUTANEOUS WEEKLY
Qty: 2 ML | Refills: 0 | Status: SHIPPED | OUTPATIENT
Start: 2025-07-21

## 2025-07-21 RX ORDER — ROSUVASTATIN CALCIUM 10 MG/1
10 TABLET, COATED ORAL DAILY
Qty: 30 TABLET | Refills: 5 | Status: SHIPPED | OUTPATIENT
Start: 2025-07-21 | End: 2025-07-21 | Stop reason: SDUPTHER

## 2025-07-21 RX ORDER — ROSUVASTATIN CALCIUM 10 MG/1
10 TABLET, COATED ORAL DAILY
Qty: 30 TABLET | Refills: 5 | Status: SHIPPED | OUTPATIENT
Start: 2025-07-21

## 2025-07-21 NOTE — TELEPHONE ENCOUNTER
PA for Zepbound 7.5 mg/0.5 mL auto-injector SUBMITTED to     via    []CMM-KEY:   [x]Surescripts-Case ID # [id number]   []Availity-Auth ID # NDC #   []Faxed to plan   []Other website   []Phone call Case ID #     [x]PA sent as URGENT    All office notes, labs and other pertaining documents and studies sent. Clinical questions answered. Awaiting determination from insurance company.     Turnaround time for your insurance to make a decision on your Prior Authorization can take 7-21 business days.

## 2025-07-28 NOTE — TELEPHONE ENCOUNTER
PA for Zepbound 7.5 mg/0.5 mL auto-injector DENIED    Reason:(Screenshot if applicable)        Message sent to office clinical pool Yes    Denial letter scanned into Media Yes    We can gladly do an appeal but the process can take about 30-60 days to provide determination. Please have the office staff schedule a Peer to Peer at phone [phone] . If an appeal is truly warranted please have Provider send clinical documentation to the PA department to support the appeal.     **Please follow up with your patient regarding denial and next steps**

## (undated) DEVICE — PACK C-SECTION PBDS

## (undated) DEVICE — SUT VICRYL 0 CT 36 IN J958H

## (undated) DEVICE — GLOVE SRG BIOGEL ECLIPSE 6

## (undated) DEVICE — GLOVE INDICATOR UNDERGLOVE SZ 6 BLUE

## (undated) DEVICE — SUT VICRYL 2-0 CT-1 36 IN J945H

## (undated) DEVICE — ADHESIVE SKN CLSR HISTOACRYL FLEX 0.5ML LF

## (undated) DEVICE — CHLORAPREP HI-LITE 26ML ORANGE

## (undated) DEVICE — ABG MICROSTICKS SAFETY

## (undated) DEVICE — SUT PLAIN 3-0 CT-1 27 IN 842H

## (undated) DEVICE — SUT MONOCRYL 4-0 PS-2 27 IN Y426H